# Patient Record
Sex: FEMALE | Race: WHITE
[De-identification: names, ages, dates, MRNs, and addresses within clinical notes are randomized per-mention and may not be internally consistent; named-entity substitution may affect disease eponyms.]

---

## 2018-05-31 ENCOUNTER — HOSPITAL ENCOUNTER (EMERGENCY)
Dept: HOSPITAL 56 - MW.ED | Age: 53
Discharge: HOME | End: 2018-05-31
Payer: COMMERCIAL

## 2018-05-31 VITALS — SYSTOLIC BLOOD PRESSURE: 130 MMHG | DIASTOLIC BLOOD PRESSURE: 73 MMHG

## 2018-05-31 DIAGNOSIS — S40.012A: ICD-10-CM

## 2018-05-31 DIAGNOSIS — Z88.2: ICD-10-CM
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
## 2018-05-31 DIAGNOSIS — S93.402A: Primary | ICD-10-CM

## 2018-05-31 DIAGNOSIS — Z79.899: ICD-10-CM

## 2018-05-31 DIAGNOSIS — Z88.5: ICD-10-CM

## 2018-05-31 PROCEDURE — 73030 X-RAY EXAM OF SHOULDER: CPT

## 2018-05-31 PROCEDURE — 73610 X-RAY EXAM OF ANKLE: CPT

## 2018-05-31 PROCEDURE — 99283 EMERGENCY DEPT VISIT LOW MDM: CPT

## 2018-05-31 PROCEDURE — 96372 THER/PROPH/DIAG INJ SC/IM: CPT

## 2018-05-31 NOTE — CR
Left ankle

 

Medical history: Pain

 

Comparison: January 4, 2012

 

Findings: Again seen is diffuse soft tissue swelling of the lower leg. The mortise of the ankle is in
tact. The bones are mildly osteopenic. There is no acute bony finding.

 

Impression: Global soft tissue swelling without bony abnormality

## 2018-05-31 NOTE — CR
Left shoulder

 

Clinical history: Pain

 

Comparison: None.

 

Findings: The humeral head articular extremity with the glenoid without fracture or subluxation or si
gnificant para articular calcification. There is mild AC joint osteoarthropathy. No contiguous bony a
bnormalities of the ribs are otherwise are identified.

 

Impression: Mild osteoarthritic arthropathy of the AC joint without acute findings.

## 2018-05-31 NOTE — EDM.PDOC
ED HPI GENERAL MEDICAL PROBLEM





- General


Chief Complaint: Lower Extremity Injury/Pain


Stated Complaint: FALL


Time Seen by Provider: 05/31/18 10:32





- History of Present Illness


INITIAL COMMENTS - FREE TEXT/NARRATIVE: 





HISTORY AND PHYSICAL:





History of present illness:


The patient is a 52-year-old female who presents after she slipped on a rug at 

home and landed onto her left. She was having a normal morning prior to these 

events and this event occurred about 3-1/2 hours ago. She complains of pain at 

her left ankle and her left shoulder but no hip or knee pain she has no head 

neck or back pain nor did she pass out. Patient has no chest pain or shortness 

of breath no rib pain and no abdominal complaints. Patient says she has had ice 

on her ankle but is concerned because it is still very swollen.





Review of systems: 


As per history of present illness and below otherwise all systems reviewed and 

negative.





Past medical history: 


As per history of present illness and as reviewed below otherwise 

noncontributory.





Surgical history: 


As per history of present illness and as reviewed below otherwise 

noncontributory.





Social history: 


No reported history of drug or alcohol abuse.





Family history: 


As per history of present illness and as reviewed below otherwise 

noncontributory.





Physical exam:


General: Well-developed well-nourished female who is nontoxic and vital signs 

are reviewed by me


HEENT: Atraumatic, normocephalic,  negative for conjunctival pallor or scleral 

icterus, mucous membranes moist, throat clear, neck supple, nontender, trachea 

midline. There are no midline step-offs tenderness defects of the cervical 

spine and no scalp tenderness or swelling is appreciated on palpation


Lungs: Clear to auscultation, breath sounds equal bilaterally, chest nontender.


Heart: S1S2, regular rate and rhythm no overt murmurs


Abdomen: Soft, nondistended, nontender. NABS


Pelvis: Stable nontender. No lateral hip tenderness


Genitourinary: Deferred.


Rectal: Deferred.


Extremities: Atraumatic with full range of motion of all extremities with the 

exception of the left shoulder and left ankle. At the left shoulder there is no 

soft tissue swelling ecchymosis or palpable bony deformities but there is 

tenderness with palpation at the lateral aspect of the humerus and distal 

clavicle area neurovascular is intact distally and there is no distal bony 

deformities or tenderness. At the left ankle there is soft tissue swelling at 

the lateral malleolus with tenderness but no malalignment is appreciated of the 

ankle. Distal metatarsals and toes are intact without tenderness or deformities 

and proximal tib-fib knee thigh and hip are nontender and there are no palpable 

bony deformities. Pulses are intact throughout. The legs are, negative for 

cords or calf pain. Neurovascular unremarkable.


Neuro: Awake, alert, oriented. Cranial nerves II through XII unremarkable. 

Cerebellum unremarkable. Motor and sensory unremarkable throughout. Exam 

nonfocal.


Back: There are no midline step-offs in his defects of the thoracic or lumbar 

spine no posterior rib or posterior pelvis tenderness and no soft tissue trauma 

is appreciated


Diagnostics:


X-ray left ankle and left shoulder





Therapeutics:


Toradol crutches cam boot





Impression: 


Fall with left shoulder contusion and left ankle sprain





Definitive disposition and diagnosis as appropriate pending reevaluation and 

review of above.


  ** Left Ankle


Pain Score (Numeric/FACES): 7





  ** Left Shoulder


Pain Score (Numeric/FACES): 3





- Related Data


 Allergies











Allergy/AdvReac Type Severity Reaction Status Date / Time


 


codeine Allergy  Hives Verified 05/31/18 10:41


 


pseudoephedrine Allergy  palpitation Verified 05/31/18 10:41





   s  


 


Sulfa (Sulfonamide Allergy  unknown Verified 05/31/18 10:41





Antibiotics)     











Home Meds: 


 Home Meds





Amitriptyline [Elavil] 1 tab PO DAILY 03/13/15 [History]


ClonazePAM [KlonoPIN] 2 mg PO TID 05/31/18 [History]











Review of Systems





- Review of Systems


Review Of Systems: ROS reveals no pertinent complaints other than HPI.





ED EXAM, GENERAL





- Physical Exam


Exam: See Below (see dictation)





Course





- Vital Signs


Last Recorded V/S: 


 Last Vital Signs











Temp  37.4 C   05/31/18 10:31


 


Pulse  89   05/31/18 10:31


 


Resp  18   05/31/18 10:31


 


BP  130/73   05/31/18 10:31


 


Pulse Ox  86 L  05/31/18 10:31














- Orders/Labs/Meds


Orders: 


 Active Orders 24 hr











 Category Date Time Status


 


 DME for Discharge [COMM] Stat Oth  05/31/18 12:09 Ordered











Meds: 


Medications














Discontinued Medications














Generic Name Dose Route Start Last Admin





  Trade Name Freq  PRN Reason Stop Dose Admin


 


Ketorolac Tromethamine  60 mg  05/31/18 10:37  05/31/18 10:58





  Toradol  IM  05/31/18 10:38  60 mg





  ONETIME ONE   Administration





     





     





     





     














Departure





- Departure


Time of Disposition: 12:10


Disposition: Home, Self-Care 01


Condition: Good


Clinical Impression: 


Ankle sprain


Qualifiers:


 Encounter type: initial encounter Involved ligament of ankle: unspecified 

ligament Laterality: left Qualified Code(s): S93.402A - Sprain of unspecified 

ligament of left ankle, initial encounter





Fall


Qualifiers:


 Encounter type: initial encounter Qualified Code(s): W19.XXXA - Unspecified 

fall, initial encounter





Contusion of left shoulder


Qualifiers:


 Encounter type: initial encounter Qualified Code(s): S40.012A - Contusion of 

left shoulder, initial encounter








- Discharge Information


Referrals: 


PCP,None [Primary Care Provider] - 


Forms:  ED Department Discharge


Additional Instructions: 


The following information is given to patients seen in the emergency department 

who are being discharged to home. This information is to outline your options 

for follow-up care. We provide all patients seen in our emergency department 

with a follow-up referral.





The need for follow-up, as well as the timing and circumstances, are variable 

depending upon the specifics of your emergency department visit.





If you don't have a primary care physician on staff, we will provide you with a 

referral. We always advise you to contact your personal physician following an 

emergency department visit to inform them of the circumstance of the visit and 

for follow-up with them and/or the need for any referrals to a consulting 

specialist.





The emergency department will also refer you to a specialist when appropriate. 

This referral assures that you have the opportunity for followup care with a 

specialist. All of these measure are taken in an effort to provide you with 

optimal care, which includes your followup.





Under all circumstances we always encourage you to contact your private 

physician who remains a resource for coordinating  your care. When calling for 

followup care, please make the office aware that this follow-up is from your 

recent emergency room visit. If for any reason you are refused follow-up, 

please contact the [facility] emergency 

department at [phone] and ask to speak to the emergency department 

charge nurse.





[facility] 


Specialty Care--Orthopedic clinic


[address] [address]


[phone]








Ice to all areas of discomfort and you will be sore and have aches and pains 

over the next few days to one week. Elevate the ankle as much as possible. 

Please call and schedule a follow-up appointment in orthopedics clinic using 

resources given to above and return to ER as needed and as discussed. Use over-

the-counter ibuprofen/Motrin for pain area you may use the tramadol as 

prescribed for breakthrough pain and only take this while you're at home. Use 

crutches at all times and wear cam boot until you're seen in the orthopedics 

clinic. Please loosen or move the boot at sleep times.





- My Orders


Last 24 Hours: 


My Active Orders





05/31/18 12:09


DME for Discharge [COMM] Stat 














- Assessment/Plan


Last 24 Hours: 


My Active Orders





05/31/18 12:09


DME for Discharge [COMM] Stat

## 2019-04-19 ENCOUNTER — HOSPITAL ENCOUNTER (EMERGENCY)
Dept: HOSPITAL 56 - MW.ED | Age: 54
Discharge: HOME | End: 2019-04-19
Payer: COMMERCIAL

## 2019-04-19 VITALS — SYSTOLIC BLOOD PRESSURE: 112 MMHG | DIASTOLIC BLOOD PRESSURE: 73 MMHG

## 2019-04-19 DIAGNOSIS — Z88.2: ICD-10-CM

## 2019-04-19 DIAGNOSIS — Z79.899: ICD-10-CM

## 2019-04-19 DIAGNOSIS — Z88.5: ICD-10-CM

## 2019-04-19 DIAGNOSIS — K62.5: Primary | ICD-10-CM

## 2019-04-19 DIAGNOSIS — Z88.8: ICD-10-CM

## 2019-04-19 DIAGNOSIS — F17.210: ICD-10-CM

## 2019-04-19 DIAGNOSIS — R09.02: ICD-10-CM

## 2019-04-19 DIAGNOSIS — N10: ICD-10-CM

## 2019-04-19 DIAGNOSIS — N39.0: ICD-10-CM

## 2019-04-19 LAB
CHLORIDE SERPL-SCNC: 100 MMOL/L (ref 98–107)
SODIUM SERPL-SCNC: 141 MMOL/L (ref 136–145)

## 2019-04-19 PROCEDURE — 85730 THROMBOPLASTIN TIME PARTIAL: CPT

## 2019-04-19 PROCEDURE — 71046 X-RAY EXAM CHEST 2 VIEWS: CPT

## 2019-04-19 PROCEDURE — 85025 COMPLETE CBC W/AUTO DIFF WBC: CPT

## 2019-04-19 PROCEDURE — 87086 URINE CULTURE/COLONY COUNT: CPT

## 2019-04-19 PROCEDURE — 96375 TX/PRO/DX INJ NEW DRUG ADDON: CPT

## 2019-04-19 PROCEDURE — 80053 COMPREHEN METABOLIC PANEL: CPT

## 2019-04-19 PROCEDURE — 83690 ASSAY OF LIPASE: CPT

## 2019-04-19 PROCEDURE — 96361 HYDRATE IV INFUSION ADD-ON: CPT

## 2019-04-19 PROCEDURE — 93005 ELECTROCARDIOGRAM TRACING: CPT

## 2019-04-19 PROCEDURE — 74177 CT ABD & PELVIS W/CONTRAST: CPT

## 2019-04-19 PROCEDURE — 99284 EMERGENCY DEPT VISIT MOD MDM: CPT

## 2019-04-19 PROCEDURE — 81001 URINALYSIS AUTO W/SCOPE: CPT

## 2019-04-19 PROCEDURE — 96374 THER/PROPH/DIAG INJ IV PUSH: CPT

## 2019-04-19 PROCEDURE — 87040 BLOOD CULTURE FOR BACTERIA: CPT

## 2019-04-19 PROCEDURE — 87186 SC STD MICRODIL/AGAR DIL: CPT

## 2019-04-19 PROCEDURE — 87088 URINE BACTERIA CULTURE: CPT

## 2019-04-19 PROCEDURE — 85610 PROTHROMBIN TIME: CPT

## 2019-04-19 PROCEDURE — 83605 ASSAY OF LACTIC ACID: CPT

## 2019-04-19 PROCEDURE — 84484 ASSAY OF TROPONIN QUANT: CPT

## 2019-04-19 NOTE — EDM.PDOC
<Juma Mcconnell - Last Filed: 04/19/19 23:31>





ED HPI GENERAL MEDICAL PROBLEM





- General


Chief Complaint: Gastrointestinal Problem


Stated Complaint: DIARRHEA


Time Seen by Provider: 04/19/19 20:32





- History of Present Illness


INITIAL COMMENTS - FREE TEXT/NARRATIVE: 





Patient presents as above of seeming and examined the patient she is hypoxic 

down to 80% on room air long smoking history


Did discuss CT imaging in detail she has some intermittent bright red blood per 

rectum with stools no abdominal pain


Patient also has a UTI





I strongly urged the patient to be admitted, he refuses admission and elects to 

sign out AGAINST MEDICAL ADVICE excepting death as a consequence she has voiced 

understanding, she states she'll return if symptoms persist or worsen however 

family is here for Lenora and she is refusing to stay in the hospital





HEENT grossly within normal limits


Chest clear


CV regular


Abdomen benign


 extremities full range of motion strength 5 out of 5 no edema


CNS alert nonfocal


Rectal exam patient deferred 





Therapeutics





Cipro


Medrol Dosepak


Patient has an albuterol HFA





Impression


Hypoxia


Red blood per rectum with stools


Colitis versus ischemic bowel


UTI





Patient signed out AGAINST MEDICAL ADVICE





Definitive disposition and diagnosis as appropriate pending reevaluation and 

review of above








Impression





- Related Data


 Allergies











Allergy/AdvReac Type Severity Reaction Status Date / Time


 


codeine Allergy  Hives Verified 11/05/18 18:09


 


pseudoephedrine Allergy  palpitation Verified 11/05/18 18:09





   s  


 


Sulfa (Sulfonamide Allergy  unknown Verified 11/05/18 18:09





Antibiotics)     











Home Meds: 


 Home Meds





Amitriptyline [Elavil] 1 tab PO DAILY 03/13/15 [History]


ClonazePAM [KlonoPIN] 2 mg PO ASDIRECTED PRN 05/31/18 [History]











ED ROS GENERAL





- Review of Systems


Review Of Systems: See Below





ED EXAM, GI/ABD





- Physical Exam


Exam: See Below





Course





- Vital Signs


Last Recorded V/S: 


 Last Vital Signs











Temp  36.8 C   04/19/19 23:49


 


Pulse  95   04/19/19 23:49


 


Resp  22 H  04/19/19 23:49


 


BP  112/73   04/19/19 23:49


 


Pulse Ox  71 L  04/19/19 23:49














- Orders/Labs/Meds


Labs: 


 Laboratory Tests











  04/19/19 04/19/19 04/19/19 Range/Units





  20:05 21:09 21:09 


 


WBC  15.95 H    (4.0-11.0)  K/uL


 


RBC  5.59    (4.30-5.90)  M/uL


 


Hgb  17.8 H    (12.0-16.0)  g/dL


 


Hct  53.8 H    (36.0-46.0)  %


 


MCV  96.2    (80.0-98.0)  fL


 


MCH  31.8    (27.0-32.0)  pg


 


MCHC  33.1    (31.0-37.0)  g/dL


 


RDW Std Deviation  55.1    (28.0-62.0)  fl


 


RDW Coeff of Jason  16 H    (11.0-15.0)  %


 


Plt Count  130 L    (150-400)  K/uL


 


MPV  11.00    (7.40-12.00)  fL


 


Neut % (Auto)  80.8 H    (48.0-80.0)  %


 


Lymph % (Auto)  12.0 L    (16.0-40.0)  %


 


Mono % (Auto)  6.5    (0.0-15.0)  %


 


Eos % (Auto)  0.6    (0.0-7.0)  %


 


Baso % (Auto)  0.1    (0.0-1.5)  %


 


Neut # (Auto)  12.9 H    (1.4-5.7)  K/uL


 


Lymph # (Auto)  1.9    (0.6-2.4)  K/uL


 


Mono # (Auto)  1.0 H    (0.0-0.8)  K/uL


 


Eos # (Auto)  0.1    (0.0-0.7)  K/uL


 


Baso # (Auto)  0.0    (0.0-0.1)  K/uL


 


Nucleated RBC %  0.0    /100WBC


 


Nucleated RBCs #  0    K/uL


 


INR     


 


APTT     (18.6-31.3)  SEC


 


Lactate     (0.20-2.00)  mmol/L


 


Sodium   141   (136-145)  mmol/L


 


Potassium   3.4 L   (3.5-5.1)  mmol/L


 


Chloride   100   ()  mmol/L


 


Carbon Dioxide   30.4   (21.0-32.0)  mmol/L


 


BUN   8   (7.0-18.0)  mg/dL


 


Creatinine   0.9   (0.6-1.0)  mg/dL


 


Est Cr Clr Drug Dosing   68.68   mL/min


 


Estimated GFR (MDRD)   > 60.0   ml/min


 


Glucose   109 H   ()  mg/dL


 


Calcium   8.3 L   (8.5-10.1)  mg/dL


 


Total Bilirubin   0.9   (0.2-1.0)  mg/dL


 


AST   13 L   (15-37)  IU/L


 


ALT   13 L   (14-63)  IU/L


 


Alkaline Phosphatase   116   ()  U/L


 


Troponin I     (0.000-0.056)  ng/mL


 


Total Protein   6.9   (6.4-8.2)  g/dL


 


Albumin   3.0 L   (3.4-5.0)  g/dL


 


Globulin   3.9   (2.6-4.0)  g/dL


 


Albumin/Globulin Ratio   0.8 L   (0.9-1.6)  


 


Lipase    83  ()  U/L


 


Urine Color     


 


Urine Appearance     


 


Urine pH     (5.0-8.0)  


 


Ur Specific Gravity     (1.001-1.035)  


 


Urine Protein     (NEGATIVE)  mg/dL


 


Urine Glucose (UA)     (NEGATIVE)  mg/dL


 


Urine Ketones     (NEGATIVE)  mg/dL


 


Urine Occult Blood     (NEGATIVE)  


 


Urine Nitrite     (NEGATIVE)  


 


Urine Bilirubin     (NEGATIVE)  


 


Urine Ictotest     


 


Urine Urobilinogen     (<2.0)  EU/dL


 


Ur Leukocyte Esterase     (NEGATIVE)  


 


Urine RBC     (0-2/HPF)  


 


Urine WBC     (0-5/HPF)  


 


Ur Epithelial Cells     (NONE-FEW)  


 


Urine Bacteria     (NEGATIVE)  


 


Urine Mucus     (NONE-MOD)  














  04/19/19 04/19/19 04/19/19 Range/Units





  21:09 21:15 21:47 


 


WBC     (4.0-11.0)  K/uL


 


RBC     (4.30-5.90)  M/uL


 


Hgb     (12.0-16.0)  g/dL


 


Hct     (36.0-46.0)  %


 


MCV     (80.0-98.0)  fL


 


MCH     (27.0-32.0)  pg


 


MCHC     (31.0-37.0)  g/dL


 


RDW Std Deviation     (28.0-62.0)  fl


 


RDW Coeff of Jason     (11.0-15.0)  %


 


Plt Count     (150-400)  K/uL


 


MPV     (7.40-12.00)  fL


 


Neut % (Auto)     (48.0-80.0)  %


 


Lymph % (Auto)     (16.0-40.0)  %


 


Mono % (Auto)     (0.0-15.0)  %


 


Eos % (Auto)     (0.0-7.0)  %


 


Baso % (Auto)     (0.0-1.5)  %


 


Neut # (Auto)     (1.4-5.7)  K/uL


 


Lymph # (Auto)     (0.6-2.4)  K/uL


 


Mono # (Auto)     (0.0-0.8)  K/uL


 


Eos # (Auto)     (0.0-0.7)  K/uL


 


Baso # (Auto)     (0.0-0.1)  K/uL


 


Nucleated RBC %     /100WBC


 


Nucleated RBCs #     K/uL


 


INR  1.06    


 


APTT  23.8    (18.6-31.3)  SEC


 


Lactate    1.0  (0.20-2.00)  mmol/L


 


Sodium     (136-145)  mmol/L


 


Potassium     (3.5-5.1)  mmol/L


 


Chloride     ()  mmol/L


 


Carbon Dioxide     (21.0-32.0)  mmol/L


 


BUN     (7.0-18.0)  mg/dL


 


Creatinine     (0.6-1.0)  mg/dL


 


Est Cr Clr Drug Dosing     mL/min


 


Estimated GFR (MDRD)     ml/min


 


Glucose     ()  mg/dL


 


Calcium     (8.5-10.1)  mg/dL


 


Total Bilirubin     (0.2-1.0)  mg/dL


 


AST     (15-37)  IU/L


 


ALT     (14-63)  IU/L


 


Alkaline Phosphatase     ()  U/L


 


Troponin I     (0.000-0.056)  ng/mL


 


Total Protein     (6.4-8.2)  g/dL


 


Albumin     (3.4-5.0)  g/dL


 


Globulin     (2.6-4.0)  g/dL


 


Albumin/Globulin Ratio     (0.9-1.6)  


 


Lipase     ()  U/L


 


Urine Color   DARK YELLOW   


 


Urine Appearance   HAZY   


 


Urine pH   6.0   (5.0-8.0)  


 


Ur Specific Gravity   1.020   (1.001-1.035)  


 


Urine Protein   30 H   (NEGATIVE)  mg/dL


 


Urine Glucose (UA)   NEGATIVE   (NEGATIVE)  mg/dL


 


Urine Ketones   TRACE H   (NEGATIVE)  mg/dL


 


Urine Occult Blood   NEGATIVE   (NEGATIVE)  


 


Urine Nitrite   NEGATIVE   (NEGATIVE)  


 


Urine Bilirubin   MODERATE H   (NEGATIVE)  


 


Urine Ictotest   NEGATIVE   


 


Urine Urobilinogen   1.0   (<2.0)  EU/dL


 


Ur Leukocyte Esterase   SMALL H   (NEGATIVE)  


 


Urine RBC   0-4   (0-2/HPF)  


 


Urine WBC   50-60   (0-5/HPF)  


 


Ur Epithelial Cells   MODERATE   (NONE-FEW)  


 


Urine Bacteria   1+ H   (NEGATIVE)  


 


Urine Mucus   LIGHT   (NONE-MOD)  














  04/19/19 Range/Units





  21:47 


 


WBC   (4.0-11.0)  K/uL


 


RBC   (4.30-5.90)  M/uL


 


Hgb   (12.0-16.0)  g/dL


 


Hct   (36.0-46.0)  %


 


MCV   (80.0-98.0)  fL


 


MCH   (27.0-32.0)  pg


 


MCHC   (31.0-37.0)  g/dL


 


RDW Std Deviation   (28.0-62.0)  fl


 


RDW Coeff of Jason   (11.0-15.0)  %


 


Plt Count   (150-400)  K/uL


 


MPV   (7.40-12.00)  fL


 


Neut % (Auto)   (48.0-80.0)  %


 


Lymph % (Auto)   (16.0-40.0)  %


 


Mono % (Auto)   (0.0-15.0)  %


 


Eos % (Auto)   (0.0-7.0)  %


 


Baso % (Auto)   (0.0-1.5)  %


 


Neut # (Auto)   (1.4-5.7)  K/uL


 


Lymph # (Auto)   (0.6-2.4)  K/uL


 


Mono # (Auto)   (0.0-0.8)  K/uL


 


Eos # (Auto)   (0.0-0.7)  K/uL


 


Baso # (Auto)   (0.0-0.1)  K/uL


 


Nucleated RBC %   /100WBC


 


Nucleated RBCs #   K/uL


 


INR   


 


APTT   (18.6-31.3)  SEC


 


Lactate   (0.20-2.00)  mmol/L


 


Sodium   (136-145)  mmol/L


 


Potassium   (3.5-5.1)  mmol/L


 


Chloride   ()  mmol/L


 


Carbon Dioxide   (21.0-32.0)  mmol/L


 


BUN   (7.0-18.0)  mg/dL


 


Creatinine   (0.6-1.0)  mg/dL


 


Est Cr Clr Drug Dosing   mL/min


 


Estimated GFR (MDRD)   ml/min


 


Glucose   ()  mg/dL


 


Calcium   (8.5-10.1)  mg/dL


 


Total Bilirubin   (0.2-1.0)  mg/dL


 


AST   (15-37)  IU/L


 


ALT   (14-63)  IU/L


 


Alkaline Phosphatase   ()  U/L


 


Troponin I  < 0.050  (0.000-0.056)  ng/mL


 


Total Protein   (6.4-8.2)  g/dL


 


Albumin   (3.4-5.0)  g/dL


 


Globulin   (2.6-4.0)  g/dL


 


Albumin/Globulin Ratio   (0.9-1.6)  


 


Lipase   ()  U/L


 


Urine Color   


 


Urine Appearance   


 


Urine pH   (5.0-8.0)  


 


Ur Specific Gravity   (1.001-1.035)  


 


Urine Protein   (NEGATIVE)  mg/dL


 


Urine Glucose (UA)   (NEGATIVE)  mg/dL


 


Urine Ketones   (NEGATIVE)  mg/dL


 


Urine Occult Blood   (NEGATIVE)  


 


Urine Nitrite   (NEGATIVE)  


 


Urine Bilirubin   (NEGATIVE)  


 


Urine Ictotest   


 


Urine Urobilinogen   (<2.0)  EU/dL


 


Ur Leukocyte Esterase   (NEGATIVE)  


 


Urine RBC   (0-2/HPF)  


 


Urine WBC   (0-5/HPF)  


 


Ur Epithelial Cells   (NONE-FEW)  


 


Urine Bacteria   (NEGATIVE)  


 


Urine Mucus   (NONE-MOD)  











Meds: 


Medications














Discontinued Medications














Generic Name Dose Route Start Last Admin





  Trade Name Freq  PRN Reason Stop Dose Admin


 


Albuterol/Ipratropium  3 ml  04/19/19 23:09  04/19/19 23:16





  Duoneb 3.0-0.5 Mg/3 Ml  NEB  04/19/19 23:10  3 ml





  ONETIME ONE   Administration





     





     





     





     


 


Sodium Chloride  1,000 mls @ 999 mls/hr  04/19/19 21:02  04/19/19 21:26





  Normal Saline  IV  04/19/19 22:02  999 mls/hr





  BOLUS ONE   Administration





     





     





     





     


 


Iopamidol  100 ml  04/19/19 21:41  04/19/19 22:02





  Isovue-370 (76%)  IVPUSH  04/19/19 21:42  100 ml





  ONETIME ONE   Administration





     





     





     





     


 


Methylprednisolone Sodium Succinate  125 mg  04/19/19 23:09  04/19/19 23:19





  Solu-Medrol  IVPUSH  04/19/19 23:10  125 mg





  ONETIME ONE   Administration





     





     





     





     


 


Morphine Sulfate  2 mg  04/19/19 21:48  04/19/19 22:08





  Morphine  IVPUSH  04/19/19 21:49  2 mg





  ONETIME ONE   Administration





     





     





     





     














Departure





- Departure


Time of Disposition: 23:34


Disposition: Home, Self-Care 01


Condition: Poor


Clinical Impression: 


 Hypoxia, UTI, Urinary tract infectious disease, Bright red blood per rectum








- Discharge Information


Instructions:  Hypoxia, Urinary Tract Infection, Adult, Easy-to-Read, Rectal 

Bleeding, Easy-to-Read


Referrals: 


PCP,Unknown [Primary Care Provider] - 


Forms:  ED Department Discharge


Additional Instructions: 


Medications as prescribed


Strongly urged return if symptoms persist or worsen or if new concerning 

symptoms develop


Follow-up primary care on Monday for marie 





[facility]


[address] [place] [id number]


Phone: [phone]


Fax: [phone]








The following information is given to patients seen in the emergency department 

who are being discharged to home. This information is to outline your options 

for follow-up care. We provide all patients seen in our emergency department 

with a follow-up referral.





The need for follow-up, as well as the timing and circumstances, are variable 

depending upon the specifics of your emergency department visit.





If you don't have a primary care physician on staff, we will provide you with a 

referral. We always advise you to contact your personal physician following an 

emergency department visit to inform them of the circumstance of the visit and 

for follow-up with them and/or the need for any referrals to a consulting 

specialist.





The emergency department will also refer you to a specialist when appropriate. 

This referral assures that you have the opportunity for follow-up care with a 

specialist. All of these measure are taken in an effort to provide you with 

optimal care, which includes your follow-up.





Under all circumstances we always encourage you to contact your private 

physician who remains a resource for coordinating your care. When calling for 

follow-up care, please make the office aware that this follow-up is from your 

recent emergency room visit. If for any reason you are refused follow-up, 

please contact the [facility] emergency department at [phone] 

and asked to speak to the emergency department charge nurse.











<Yamileth Jeffrey - Last Filed: 04/21/19 15:45>





ED HPI GENERAL MEDICAL PROBLEM





- General


Source of Information: Reports: Patient


History Limitations: Reports: No Limitations





- History of Present Illness


INITIAL COMMENTS - FREE TEXT/NARRATIVE: 


HISTORY AND PHYSICAL:





History of present illness:


Patient is a 53-year-old female who presents to the ED today with concern of 

rectal bleeding and passing clots of blood per rectum 2 days. Patient states 

she had a week where she was not able to have a bowel movement. Patient states 

she then took 2 Dulcolax tabs and started to have diarrhea over the past 3 

days. Since then, patient states over the past 24 hours she has had a small 

amount of blood continuously, out of her rectum. She states on several separate 

occasions they have been blood clots that have come out. Patient states she has 

not had diarrhea today and her last bowel movement was yesterday. Patient 

states she has a history of colon perforation that required ileostomy and 

several surgeries. Patient states she's also has left lower and right lower 

abdominal pain that she rates a 6 out of 10. She states this pain comes and 

goes and she describes it as "crampy." Patient denies any pain with the bowel 

movement and has not noticed any notable hemorrhoids. Patient denies ever 

having these symptoms prior. Patient also complains of low back pain that she 

rates a 7/10.





Patient denies fever, chills, chest pain, shortness of breath, or cough. Denies 

headache, neck stiff ness, change in vision, syncope, or near syncope. Denies 

nausea, vomiting or dysuria. Has not noted any blood in urine. Patient has 

noticed a decrease in appetite but has been drinking fluids today.





Review of systems: 


As per history of present illness and below otherwise all systems reviewed and 

negative.





Past medical history: 


As per history of present illness and as reviewed below otherwise 

noncontributory.





Surgical history: 


As per history of present illness and as reviewed below otherwise 

noncontributory.





Social history: 


See social history for further information





Family history: 


As per history of present illness and as reviewed below otherwise 

noncontributory.





Physical exam:


General: Patient is alert, oriented, and in no acute distress. Patient sitting 

comfortably on exam table.


HEENT: Atraumatic, normocephalic, pupils equal and reactive bilaterally, 

negative for conjunctival pallor or scleral icterus, mucous membranes moist, 

TMs normal bilaterally, throat clear, neck supple, nontender, trachea midline. 

No drooling or trismus noted. No meningeal signs. No hot potato voice noted. 


Lungs: Clear to auscultation, breath sounds equal bilaterally, chest nontender.


Heart: S1S2, regular rate and rhythm without overt murmur


Abdomen: Moderate pain to palpation of the right and left lower quadrants 

without guarding.  Obese, soft, nondistended. Positive bowel sounds throughout 

all quadrants and slightly hypoactive. Negative for masses or 

hepatosplenomegaly. Positive for costovertebral tenderness of the left.


Pelvis: Stable nontender.


Genitourinary: Deferred.


Rectal: Hemoccult positive with bright red blood per rectum. No obvious 

external or internal hemorrhoids on exam. No rectal masses noted.


Skin: Intact, warm, dry. No lesions or rashes noted.


Extremities: Atraumatic, negative for cords or calf pain. Neurovascular 

unremarkable.


Neuro: Awake, alert, oriented. Cranial nerves II through XII unremarkable. 

Cerebellum unremarkable. Motor and sensory unremarkable throughout. Exam 

nonfocal.





Notes:


Dr. Mcconnell has assumed care of patient and will follow all remaining 

diagnostics and disposition.





Diagnostics:


CBC, CMP, PT/INR, PTT, UA, abdominal pelvic CT, lipase, stool studies, cdiff, 

lactate, blood cultures 2, EKG, troponin, CXR





Therapeutics:


Saline, Morphine





Impression: 


Acute pyelonephritis


Hypoxia





Plan:








Definitive disposition and diagnosis as appropriate pending reevaluation and 

review of above.





  ** back


Pain Score (Numeric/FACES): 9





Past Medical History


OB/GYN History: Reports: Pregnancy


Psychiatric History: Reports: Anxiety, Depression, PTSD





- Infectious Disease History


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

, Measles, Mumps





- Past Surgical History


HEENT Surgical History: Reports: Adenoidectomy, Tonsillectomy


GI Surgical History: Reports: Appendectomy, Cholecystectomy, Colostomy, Hernia, 

Abdominal, Other (See Below)


Other GI Surgeries/Procedures: prior colostomy and ileostomy


Female  Surgical History: Reports: Hysterectomy, Salpingo-Oophorectomy


Musculoskeletal Surgical History: Reports: Other (See Below)


Other Musculoskeletal Surgeries/Procedures:: right thumb surgery





Social & Family History





- Family History


Family Medical History: Noncontributory





- Tobacco Use


Smoking Status *Q: Current Every Day Smoker


Years of Tobacco use: 40


Packs/Tins Daily: 0.2





- Caffeine Use


Caffeine Use: Reports: Coffee





- Recreational Drug Use


Recreational Drug Use: No





ED ROS GENERAL





- Review of Systems


Review Of Systems: ROS reveals no pertinent complaints other than HPI.





ED EXAM, GI/ABD





- Physical Exam


Exam: See Below (see dictation)

## 2019-04-19 NOTE — CR
INDICATION:



Pain and shortness of breath



TECHNIQUE:



Chest 2 views



COMPARISON:



Chest x-ray 11/05/2018



FINDINGS:



Cardiovascular and mediastinum:  Heart size and vasculature are normal in 

caliber and appearance.  



Lungs and pleural spaces:  Lungs are clear.  No sign of infiltrate or mass. 

 No sign of pleural effusion.  No pneumothorax.  



Bones and soft tissues:  No significant findings.



IMPRESSION:



No acute findings and no significant changes from the prior exam.



Dictated by Demetri Villa MD @ Apr 19 2019 11:27PM



Signed by Dr. Demetri Villa @ Apr 19 2019 11:28PM

## 2019-04-19 NOTE — CT
INDICATION:



Abdominal pain



TECHNIQUE:



CT abdomen and pelvis acquired with 100 cc Isovue 370 intravenous contrast.



COMPARISON:



None. 



FINDINGS:



Lower chest: Unremarkable. 



Liver: Normal in contour with focal fat adjacent to the falciform ligament. 

Gallbladder and bile ducts: Status post cholecystectomy. 



Pancreas: Minimal calcification of the pancreatic head. 



Spleen: Unremarkable. Normal in size. No masses. 



Adrenal glands: Unremarkable. No nodules. 



Kidneys: Unremarkable. No masses, stones, or hydronephrosis. 



GI tract: The stomach is unremarkable. Small bowel is decompressed. Long 

segment wall thickening of the colon beginning at the distal transverse 

colon extending to the junction with the sigmoid. Small likely 

calcifications or clips noted near the distal descending colon. Slight 

adjacent fat stranding without evidence of abscess.



Multiple surgical clips in the mesentery.



Vasculature: Atherosclerosis without abdominal aortic aneurysm. 



Lymph nodes: Increased number of retroperitoneal lymph nodes measuring up 

to 9 millimeters in short axis. Subcentimeter lymph nodes noted within the 

central mesentery. 



Pelvis: Bladder is unremarkable. Status posthysterectomy. 



Bones: Unremarkable for age. 



IMPRESSION:



1. Long segment colonic wall thickening with adjacent inflammation 

extending from the distal transverse colon to the descending colon/sigmoid 

junction. Appearance is consistent with long segment colitis. Differential 

diagnosis would include ischemic colitis considering this distribution 

versus infectious colitis. No pneumoperitoneum or abscess. 



2. Mildly increased number of retroperitoneal lymph nodes, nonspecific. 

Differential diagnosis includes infectious, inflammatory and neoplastic 

etiologies. 



3. Other incidental findings as noted above.



Please note that all CT scans at this facility use dose modulation, 

iterative reconstruction, and/or weight-based dosing when appropriate to 

reduce radiation dose to as low as reasonably achievable.



Dictated by Demetri Villa MD @ Apr 19 2019 10:46PM



Signed by Dr. Demetri Villa @ Apr 19 2019 10:56PM

## 2019-10-29 ENCOUNTER — HOSPITAL ENCOUNTER (INPATIENT)
Dept: HOSPITAL 56 - MW.ED | Age: 54
LOS: 1 days | Discharge: NURSING, REHAB OR LONG-TERM CARE | DRG: 133 | End: 2019-10-30
Attending: INTERNAL MEDICINE | Admitting: INTERNAL MEDICINE
Payer: COMMERCIAL

## 2019-10-29 VITALS — HEART RATE: 104 BPM

## 2019-10-29 DIAGNOSIS — Z90.89: ICD-10-CM

## 2019-10-29 DIAGNOSIS — N17.9: ICD-10-CM

## 2019-10-29 DIAGNOSIS — Z88.5: ICD-10-CM

## 2019-10-29 DIAGNOSIS — Z79.899: ICD-10-CM

## 2019-10-29 DIAGNOSIS — R04.2: ICD-10-CM

## 2019-10-29 DIAGNOSIS — Z88.8: ICD-10-CM

## 2019-10-29 DIAGNOSIS — J96.92: Primary | ICD-10-CM

## 2019-10-29 DIAGNOSIS — J96.91: ICD-10-CM

## 2019-10-29 DIAGNOSIS — D75.1: ICD-10-CM

## 2019-10-29 DIAGNOSIS — Z90.710: ICD-10-CM

## 2019-10-29 DIAGNOSIS — F41.9: ICD-10-CM

## 2019-10-29 DIAGNOSIS — J18.9: ICD-10-CM

## 2019-10-29 DIAGNOSIS — F17.200: ICD-10-CM

## 2019-10-29 DIAGNOSIS — Z88.2: ICD-10-CM

## 2019-10-29 DIAGNOSIS — Z79.82: ICD-10-CM

## 2019-10-29 DIAGNOSIS — F32.9: ICD-10-CM

## 2019-10-29 DIAGNOSIS — Z90.49: ICD-10-CM

## 2019-10-29 DIAGNOSIS — I26.99: ICD-10-CM

## 2019-10-29 LAB
BUN SERPL-MCNC: 15 MG/DL (ref 7–18)
CHLORIDE SERPL-SCNC: 96 MMOL/L (ref 98–107)
CO2 SERPL-SCNC: 26.6 MMOL/L (ref 21–32)
GLUCOSE SERPL-MCNC: 132 MG/DL (ref 74–106)
POTASSIUM SERPL-SCNC: 3.9 MMOL/L (ref 3.5–5.1)
SODIUM SERPL-SCNC: 133 MMOL/L (ref 136–145)

## 2019-10-29 NOTE — PCM.HP.2
H&P History of Present Illness





- General


Date of Service: 10/29/19


Admit Problem/Dx: 


 Admission Diagnosis/Problem





Admission Diagnosis/Problem      Pulmonary embolism











- History of Present Illness


Initial Comments - Free Text/Narative: 





54 y/o female who presented to the ER complaining of hemoptysis, dyspnea. 

Patient states she had recently driven back from [place], [place]. Started noticing 

light blood tinge sputum yesterday. Today, she was having more dark red 

hemoptysis. Feeling short of breath. Denies any chest pain, nausea, vomiting. 

No abdominal pain, dysuria, diarrhea. No lower extremity edema or pain.  States 

she smokes daily. Trying to cut down. Denies alcohol intake. No illicit drug 

use. Not on any hormone therapy. No family history of coagulopathy. 





In the ER, she was found to be hypoxic with O2 sats in north 70's. Started on 4 

L NC and O2 sats above 90%. CT chest showed a right pulmonary artery embolism 

with extension to the right lower lobe.  She was given Lovenox 90 mg SQ Once.  

Currently, denying any chest pain, dyspnea. Still having some hemoptysis.  

Chest xray showed a right lower lung infiltrates. She has leukocytosis of 16. 


  ** Middle Back


Pain Score (Numeric/FACES): 8





- Related Data


Allergies/Adverse Reactions: 


 Allergies











Allergy/AdvReac Type Severity Reaction Status Date / Time


 


codeine Allergy  Hives Verified 10/29/19 15:34


 


pseudoephedrine Allergy  palpitation Verified 10/29/19 15:34





   s  


 


Sulfa (Sulfonamide Allergy  unknown Verified 10/29/19 15:34





Antibiotics)     











Home Medications: 


 Home Meds





Amitriptyline [Elavil] 1 tab PO DAILY 03/13/15 [History]


ClonazePAM [KlonoPIN] 2 mg PO ASDIRECTED PRN 05/31/18 [History]


Aspirin [Ecotrin EC] 4 tab PO DAILY 10/29/19 [History]











Past Medical History


Respiratory History: Reports: SOB


OB/GYN History: Reports: Pregnancy


Psychiatric History: Reports: Anxiety, Depression, PTSD





- Infectious Disease History


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

, Measles, Mumps





- Past Surgical History


HEENT Surgical History: Reports: Adenoidectomy, Tonsillectomy


GI Surgical History: Reports: Appendectomy, Cholecystectomy, Colostomy, Hernia, 

Abdominal, Other (See Below)


Other GI Surgeries/Procedures: prior colostomy and ileostomy


Female  Surgical History: Reports: Hysterectomy, Salpingo-Oophorectomy


Musculoskeletal Surgical History: Reports: Other (See Below)


Other Musculoskeletal Surgeries/Procedures:: right thumb surgery





Social & Family History





- Family History


Family Medical History: Noncontributory





- Tobacco Use


Smoking Status *Q: Current Every Day Smoker


Years of Tobacco use: 35


Packs/Tins Daily: 0.5





- Caffeine Use


Caffeine Use: Reports: Coffee





- Recreational Drug Use


Recreational Drug Use: No





H&P Review of Systems





- Review of Systems:


Review Of Systems: ROS reveals no pertinent complaints other than HPI.





Exam





- Exam


Exam: See Below





- Vital Signs


Vital Signs: 


 Last Vital Signs











Temp  36.6 C   10/29/19 15:30


 


Pulse  100   10/29/19 17:05


 


Resp  20   10/29/19 17:05


 


BP  128/77   10/29/19 17:05


 


Pulse Ox  95   10/29/19 17:05











Weight: 93.44 kg





- Exam


Quality Assessment: Supplemental Oxygen


General: Alert, Oriented, Cooperative


HEENT: Other (dry blood tinged oral mucosa)


Lungs: Other (Decreased lung sounds bilaterally with some right basilar 

crackles. No wheezing.)


Cardiovascular: Regular Rhythm, Tachycardia


GI/Abdominal Exam: Normal Bowel Sounds, Soft, Non-Tender, No Distention


Back Exam: Normal Inspection


Extremities: Normal Inspection, No Pedal Edema


Skin: Warm, Dry


Neuro Extensive - Mental Status: Alert, Oriented x3





- Patient Data


Lab Results Last 24 hrs: 


 Laboratory Results - last 24 hr











  10/29/19 10/29/19 10/29/19 Range/Units





  15:40 15:40 15:40 


 


WBC  16.04 H    (4.0-11.0)  K/uL


 


RBC  6.07 H    (4.30-5.90)  M/uL


 


Hgb  18.5 H    (12.0-16.0)  g/dL


 


Hct  57.4 H    (36.0-46.0)  %


 


MCV  94.6    (80.0-98.0)  fL


 


MCH  30.5    (27.0-32.0)  pg


 


MCHC  32.2    (31.0-37.0)  g/dL


 


RDW Std Deviation  60.1    (28.0-62.0)  fl


 


RDW Coeff of Jason  18 H    (11.0-15.0)  %


 


Plt Count  118 L    (150-400)  K/uL


 


MPV  10.50    (7.40-12.00)  fL


 


Neut % (Auto)  84.0 H    (48.0-80.0)  %


 


Lymph % (Auto)  8.0 L    (16.0-40.0)  %


 


Mono % (Auto)  7.8    (0.0-15.0)  %


 


Eos % (Auto)  0.1    (0.0-7.0)  %


 


Baso % (Auto)  0.1    (0.0-1.5)  %


 


Neut # (Auto)  13.5 H    (1.4-5.7)  K/uL


 


Lymph # (Auto)  1.3    (0.6-2.4)  K/uL


 


Mono # (Auto)  1.3 H    (0.0-0.8)  K/uL


 


Eos # (Auto)  0.0    (0.0-0.7)  K/uL


 


Baso # (Auto)  0.0    (0.0-0.1)  K/uL


 


Nucleated RBC %  0.0    /100WBC


 


Nucleated RBCs #  0    K/uL


 


INR   1.28   


 


D-Dimer, Quantitative   1.81 H   (0.0-0.50)  mg/L FEU


 


Lactate     (0.20-2.00)  mmol/L


 


Sodium    133 L  (136-145)  mmol/L


 


Potassium    3.9  (3.5-5.1)  mmol/L


 


Chloride    96 L  ()  mmol/L


 


Carbon Dioxide    26.6  (21.0-32.0)  mmol/L


 


BUN    15  (7.0-18.0)  mg/dL


 


Creatinine    1.2 H  (0.6-1.0)  mg/dL


 


Est Cr Clr Drug Dosing    51.51  mL/min


 


Estimated GFR (MDRD)    46.6  ml/min


 


Glucose    132 H  ()  mg/dL


 


Calcium    8.6  (8.5-10.1)  mg/dL


 


Total Bilirubin    1.2 H  (0.2-1.0)  mg/dL


 


AST    16  (15-37)  IU/L


 


ALT    11 L  (14-63)  IU/L


 


Alkaline Phosphatase    135 H  ()  U/L


 


Troponin I    < 0.050  (0.000-0.056)  ng/mL


 


Total Protein    7.4  (6.4-8.2)  g/dL


 


Albumin    3.1 L  (3.4-5.0)  g/dL


 


Globulin    4.3 H  (2.6-4.0)  g/dL


 


Albumin/Globulin Ratio    0.7 L  (0.9-1.6)  














  10/29/19 Range/Units





  15:40 


 


WBC   (4.0-11.0)  K/uL


 


RBC   (4.30-5.90)  M/uL


 


Hgb   (12.0-16.0)  g/dL


 


Hct   (36.0-46.0)  %


 


MCV   (80.0-98.0)  fL


 


MCH   (27.0-32.0)  pg


 


MCHC   (31.0-37.0)  g/dL


 


RDW Std Deviation   (28.0-62.0)  fl


 


RDW Coeff of Jason   (11.0-15.0)  %


 


Plt Count   (150-400)  K/uL


 


MPV   (7.40-12.00)  fL


 


Neut % (Auto)   (48.0-80.0)  %


 


Lymph % (Auto)   (16.0-40.0)  %


 


Mono % (Auto)   (0.0-15.0)  %


 


Eos % (Auto)   (0.0-7.0)  %


 


Baso % (Auto)   (0.0-1.5)  %


 


Neut # (Auto)   (1.4-5.7)  K/uL


 


Lymph # (Auto)   (0.6-2.4)  K/uL


 


Mono # (Auto)   (0.0-0.8)  K/uL


 


Eos # (Auto)   (0.0-0.7)  K/uL


 


Baso # (Auto)   (0.0-0.1)  K/uL


 


Nucleated RBC %   /100WBC


 


Nucleated RBCs #   K/uL


 


INR   


 


D-Dimer, Quantitative   (0.0-0.50)  mg/L FEU


 


Lactate  3.8 H  (0.20-2.00)  mmol/L


 


Sodium   (136-145)  mmol/L


 


Potassium   (3.5-5.1)  mmol/L


 


Chloride   ()  mmol/L


 


Carbon Dioxide   (21.0-32.0)  mmol/L


 


BUN   (7.0-18.0)  mg/dL


 


Creatinine   (0.6-1.0)  mg/dL


 


Est Cr Clr Drug Dosing   mL/min


 


Estimated GFR (MDRD)   ml/min


 


Glucose   ()  mg/dL


 


Calcium   (8.5-10.1)  mg/dL


 


Total Bilirubin   (0.2-1.0)  mg/dL


 


AST   (15-37)  IU/L


 


ALT   (14-63)  IU/L


 


Alkaline Phosphatase   ()  U/L


 


Troponin I   (0.000-0.056)  ng/mL


 


Total Protein   (6.4-8.2)  g/dL


 


Albumin   (3.4-5.0)  g/dL


 


Globulin   (2.6-4.0)  g/dL


 


Albumin/Globulin Ratio   (0.9-1.6)  











Result Diagrams: 


 10/29/19 15:40





 10/29/19 15:40


[facility] Results Last 24 hrs: 


 Microbiology











 10/29/19 15:50 Influenza Type A Antigen Screen - Final





 Nasopharyngeal Swab    NEGATIVE INFLUENZA A VIRUS AG





    REFERENCE RANGE: NEGATIVE





 Influenza Type B Antigen Screen - Final





    NEGATIVE INFLUENZA B VIRUS AG





    REFERENCE RANGE: NEGATIVE











Problem List Initiated/Reviewed/Updated: Yes


Orders Last 24hrs: 


 Active Orders 24 hr











 Category Date Time Status


 


 Admission Status [Patient Status] [ADT] Stat ADT  10/29/19 17:34 Active


 


 EKG Documentation Completion [RC] STAT Care  10/29/19 15:33 Active


 


 Intake and Output [RC] QSHIFT Care  10/29/19 18:04 Ordered


 


 Oxygen Therapy [RC] PRN Care  10/29/19 18:03 Ordered


 


 Oxygen Therapy, ED [RC] ASDIRECTED Care  10/29/19 15:33 Active


 


 RT Aerosol Therapy [RC] ASDIRECTED Care  10/29/19 15:34 Active


 


 RT Aerosol Therapy [RC] ASDIRECTED Care  10/29/19 18:05 Ordered


 


 Up ad [place] [RC] ASDIRECTED Care  10/29/19 18:03 Ordered


 


 VTE/DVT Education [RC] PER UNIT ROUTINE Care  10/29/19 18:03 Ordered


 


 Vital Signs [RC] Q4H Care  10/29/19 18:03 Ordered


 


 Regular Diet [DIET] Diet  10/29/19 Dinner Ordered


 


 Echo Comp wo Cont [US] Routine Exams  10/30/19 08:00 Ordered


 


 CULTURE BLOOD [BC] Stat Lab  10/29/19 15:40 Received


 


 CULTURE BLOOD [BC] Stat Lab  10/29/19 16:05 Received


 


 Acetaminophen [Tylenol] Med  10/29/19 18:03 Active





 650 mg PO Q4H PRN   


 


 Acetaminophen/HYDROcodone [Norco 325-5 MG] Med  10/29/19 18:03 Active





 1 tab PO Q4H PRN   


 


 Albuterol/Ipratropium [DuoNeb 3.0-0.5 MG/3 ML] Med  10/29/19 18:03 Ordered





 3 ml NEB Q4HRRT PRN   


 


 Morphine Med  10/29/19 18:03 Ordered





 2 mg IVPUSH Q2H PRN   


 


 Nicotine [Habitrol] Med  10/29/19 18:15 Ordered





 14 mg TRDERM DAILY   


 


 Ondansetron [Zofran ODT] Med  10/29/19 18:03 Ordered





 4 mg PO Q4H PRN   


 


 Ondansetron [Zofran] Med  10/29/19 18:03 Ordered





 4 mg IVPUSH Q4H PRN   


 


 Sodium Chloride 0.9% [Normal Saline] 1,000 ml Med  10/29/19 17:40 Active





 IV .Bolus   


 


 Sodium Chloride 0.9% [Saline Flush] Med  10/29/19 15:33 Active





 10 ml FLUSH ASDIRECTED PRN   


 


 Sodium Chloride 0.9% [Saline Flush] Med  10/29/19 15:33 Active





 2.5 ml FLUSH ASDIRECTED PRN   


 


 Temazepam [Restoril] Med  10/29/19 18:03 Ordered





 15 mg PO BEDTIME PRN   


 


 Blood Culture x2 Reflex Set [OM.PC] Stat Oth  10/29/19 15:34 Ordered


 


 Saline Lock Insert [OM.PC] Stat Oth  10/29/19 15:33 Ordered


 


 Resuscitation Status Routine Resus Stat  10/29/19 18:03 Ordered








 Medication Orders





Acetaminophen (Tylenol)  650 mg PO Q4H PRN


   PRN Reason: Pain (Mild 1-3)/fever


Hydrocodone Bitart/Acetaminophen (Norco 325-5 Mg)  1 tab PO Q4H PRN


   PRN Reason: Pain (moderate 4-6)


Albuterol/Ipratropium (Duoneb 3.0-0.5 Mg/3 Ml)  3 ml NEB Q4HRRT PRN


   PRN Reason: Shortness Of Breath/wheezing


Sodium Chloride (Normal Saline)  1,000 mls @ 999 mls/hr IV .Bolus ONE


   Stop: 10/29/19 18:40


   Last Admin: 10/29/19 17:52  Dose: 999 mls/hr


Morphine Sulfate (Morphine)  2 mg IVPUSH Q2H PRN


   PRN Reason: Pain (severe 7-10)


   Stop: 10/30/19 18:05


Nicotine (Habitrol)  14 mg TRDERM DAILY [place]


Ondansetron HCl (Zofran Odt)  4 mg PO Q4H PRN


   PRN Reason: nausea, able to take PO


Ondansetron HCl (Zofran)  4 mg IVPUSH Q4H PRN


   PRN Reason: Nausea


Sodium Chloride (Saline Flush)  10 ml FLUSH ASDIRECTED PRN


   PRN Reason: Keep Vein Open


Sodium Chloride (Saline Flush)  2.5 ml FLUSH ASDIRECTED PRN


   PRN Reason: Keep Vein Open


Temazepam (Restoril)  15 mg PO BEDTIME PRN


   PRN Reason: Sleep








Assessment/Plan Comment:: 





A:


1. Right pulmonary artery embolism with extension to right lower lobe


2. Erythrocytosis likely secondary to dehydration on top of suspected sleep 

apnea


3. Acute kidney injury


4. Elevated lactic acid due to above








P:





1. Right pulmonary artery embolism- Full dose Lovenox SQ Q12H.  Will need to 

discuss with patient if either starting NOACs or warfarin. Will get Echo. Start 

High flow at 6 L and titrate as needed. Repeat lactic acid q6H until normal.





2. SAMANTHA- maintenance fluids NS  ml/hr. Monitor kidney function.





dispo: 2-3 days

## 2019-10-29 NOTE — CR
Indication:



Coughing up blood.



Technique:



A single AP portable view of the chest was obtained.



Comparison:



April 19, 2019.



Findings:



The heart is normal in size. The left lung is clear. Opacities are 

identified within the interstitium and the right perihilar region. No 

pleural effusion or pneumothorax is identified.



Impression:



Right perihilar increased interstitial opacities, most likely representing 

an infiltrative process.



Dictated by Wanda Horton MD @ Oct 29 2019  4:08PM



Signed by Dr. Wanda Horton @ Oct 29 2019  4:09PM

## 2019-10-29 NOTE — CT
Indication:



Hemoptysis. Elevated D-dimer.



Technique:



Multiple contiguous axial images were obtained from the thoracic inlet 

through the upper abdomen after the intravenous administration of 80 

milliliters Isovue 370.



Please note that all CT scans at this facility use dose modulation, 

iterative reconstruction, and/or weight-based dosing when appropriate to 

reduce radiation dose to as low as reasonably achievable. 



Comparison:



None



Findings:



Pulmonary emboli urine of the right main pulmonary artery and in the 

pulmonary artery to the right lower lobe. These do not extend into the 

right middle lobe or right upper lobe. A right hilar lymph node is 

identified measuring 1.6 x 2.0 cm in size. Fullness is identified in the 

subcarinal region, suggesting lymphadenopathy. No axillary lymphadenopathy 

is identified. A precarinal lymph node is identified measuring 1.3 x 1.4 cm 

in size. The heart is borderline in size. No pericardial effusion is 

identified. 



The visualized portions of the liver, spleen, adrenals, pancreas, and left 

kidney are grossly normal. Kyphosis of the thoracic spine is identified. No 

acute fracture is identified. 



A small to moderate right-sided pleural effusion is identified. Diffusely 

increased interstitial opacities are identified throughout the right lower 

lobe and extend into the inferior aspect of the right upper lobe. No 

pneumothorax is identified. The left lung is relatively clear. 



Impression:



Right main pulmonary artery embolism that extends into the right lower 

lobe.



Small to moderate-sized right pleural effusion.



Diffuse increased interstitial opacities in the right lower lobe.



The findings were discussed with Benedict López at the time of this 

dictation



Please note that all CT scans at this facility use dose modulation, 

iterative reconstruction, and/or weight-based dosing when appropriate to 

reduce radiation dose to as low as reasonably achievable.



Dictated by Wanda Horton MD @ Oct 29 2019  5:08PM



Signed by Dr. Wanda Horton @ Oct 29 2019  5:15PM

## 2019-10-29 NOTE — US
INDICATION:



PE identified on CT today. 



COMPARISON:



None available 



FINDINGS:



Ultrasound of the venous drainage of both lower extremities shows no 

evidence of deep venous thrombosis. There is normal antegrade flow from the 

posterior tibial and peroneal veins superiorly through the common femoral 

vein in both legs. There is normal augmentation and compressibility of 

these veins.



IMPRESSION:



No evidence of deep venous thrombosis on ultrasound examination of both 

lower extremities.



Dictated by Tien Guerra MD @ Oct 29 2019 11:03PM



Signed by Dr. Tien Guerra @ Oct 29 2019 11:05PM

## 2019-10-29 NOTE — PN
TH[facility] Physician - Brief Progress [id number]/29/2019 19:36[facility] Christopher Egan, [place] - [facility] ([facility]portMACY BEARDENDate of Service 10/29/2019 19:36HPI/Event
s of Note EICU admission note:53 year old female presenting with hypoxemia and coughing clots Found t
o have a PE.She also has a moderate right sided pleural effusion and some lymphadenoopathy which is s
uspicious with her smoking history.Patient has an elevated BNP we are attempting to get an echo this 
evening to see if the patient requires TPA for right heart strain.The patient also likely has COPD/OS
A.She is likely hypoxemic at baseline based on elevated HB/HCT.  So this further complicates the case
 in evaluating submassive versus massive PE.I have ordered dopplers bilateral lower extremities to ev
aluate clot burden.Lovenox BID was started by the excellent PCP in the meanwhile.Due to elevated lact
ate I have started Zosyn, there is some infiltrative process on the CT scan of the chest, whether or 
not this is from hemorrhage or not, can not be bronched with acute PE safely.She also has Hilar and S
ubcarinal Lymphadenopathy.She needs serial lactates, and very very close follow up with pulmonary.In 
fact, she may require follow up short term CT chest with pulmonary ( 3- 6 weeks ) also with PFT in 6 
weeks or so She should stop smoking immediately.PSG should be done after all the rest of the work up 
is completed.Supplemental O2 with ambulation, sleep, and during rest should be evaluated.   Maintain 
sats > 90% with sleep and ambulation.  Overnight pulse ox 24 hours before d/c home should be complete
d.  Also and ambulatory pulse ox.Patient likely will need O2 upon d/c.EICU assessment:Acute PE, R ambrosio
n pulmonary artery, and RLLModerate Right Pleural EffusionsInfiltrates, diffuse in the RLL with under
lying fibrotic appearance is present on CT of chest.Subcarinal and hilar lymphadenopathy in the setti
ng of severe tobacco abuse.Leukocytosis with Elevated lactate, with infiltrate can not rule out sepsi
sTobacco abuseEICU recs:Start zosynStat EchoDoppler bilateral lower extremitiesFor now, Lovenox BID, 
if any further indication of RIght heart strain would have to consider ECKOS vs. TPANeeds maligancy w
ork up.  Needs Pulmonary follow up Needs short term ct follow upCautious usage of central depressing 
agents such as ativan and morphine as they may suppress her respiratory driveDecreased dosage of Ativ
an secondary to MAJO risk factorsPatient has full dose Elavil ordered, as well as Restoril.Maintain Sa
ts > 92%Sputum and blood cultures.Will sign out to pm eicu coverage to follow closely.Please call [facility]
h any changes/ recsInterventions Major-Respiratory failure - evaluation and managementMinor-Communica
tion with other healthcare providers and/or family, Routine modifications to care plan (e.g. PRN medi
cations for pain, fever)Electronically Signed by: JOSÉ MIGUEL MARIEE () on 10/29/2019 19:51

## 2019-10-29 NOTE — EDM.PDOC
ED HPI GENERAL MEDICAL PROBLEM





- General


Chief Complaint: Respiratory Problem


Stated Complaint: BLOOD CLOTS


Time Seen by Provider: 10/29/19 15:35


Source of Information: Reports: Patient


History Limitations: Reports: No Limitations





- History of Present Illness


INITIAL COMMENTS - FREE TEXT/NARRATIVE: 





HISTORY AND PHYSICAL:





History of present illness:


Patient is a 53-year-old female presents to the ED with complaint of cough x 5 

days. She states today she has been coughing up blood clots. She recently 

travelled to and from [place], [place]. She reports shortness of breath and pain in 

right mid back but denies chest pain. She states she's felt chills and 

subjective fevers. Denies nausea, vomiting, abdominal pain. Smokes 1/2 ppd x 35 

years. 





O2 in 70s on arrival, improved to 90s with 4L nasal cannula. 





Review of systems: 


As per history of present illness and below otherwise all systems reviewed and 

negative.





Past medical history: 


As per history of present illness and as reviewed below otherwise 

noncontributory.





Surgical history: 


As per history of present illness and as reviewed below otherwise 

noncontributory.





Social history: 


No reported history of drug or alcohol abuse.





Family history: 


As per history of present illness and as reviewed below otherwise 

noncontributory.





Physical exam:


General: Patient sitting comfortably in no acute distress and nontoxic appearing


HEENT: Atraumatic, normocephalic, pupils reactive, negative for conjunctival 

pallor or scleral icterus, mucous membranes moist, throat clear, neck supple, 

nontender, trachea midline. No meningeal signs. 


Lungs: Breath sounds are diminished throughout with crackles to the bases, 

chest nontender.


Heart: S1S2, regular, negative for clicks, rubs, or overt murmur.


Abdomen: Soft, nondistended, nontender. Negative for masses or 

hepatosplenomegaly. Negative for costovertebral tenderness. No rigidity, rebound

, guarding.


Pelvis: Stable nontender.


Genitourinary: Deferred.


Rectal: Deferred.


Extremities: Atraumatic, negative for cords or calf pain. Neurovascular 

unremarkable.


Neuro: Awake, alert, oriented. Cranial nerves II through XII unremarkable. 

Cerebellum unremarkable. Motor and sensory unremarkable throughout. Exam 

nonfocal.





Notes: 





Diagnostics:


CBC, CMP, troponin, d-dimer, PT/INR, EKG, CXR, CTA, influenza 





Therapeutics:


DuoNeb


Solumedrol 125mg IV


1L NS IV 


2mg morphine IV 


1mg/kg Lovenox Subq 





Prescriptions:








Impression: 


Hypoxia, Pulmonary embolism 





Plan:


Discussed with Dr. Daugherty, patient will be admitted to ICU . 





Definitive disposition and diagnosis as appropriate pending reevaluation and 

review of above.





  ** Middle Back


Pain Score (Numeric/FACES): 8





- Related Data


 Allergies











Allergy/AdvReac Type Severity Reaction Status Date / Time


 


codeine Allergy  Hives Verified 10/29/19 15:34


 


pseudoephedrine Allergy  palpitation Verified 10/29/19 15:34





   s  


 


Sulfa (Sulfonamide Allergy  unknown Verified 10/29/19 15:34





Antibiotics)     











Home Meds: 


 Home Meds





Amitriptyline [Elavil] 1 tab PO DAILY 03/13/15 [History]


ClonazePAM [KlonoPIN] 2 mg PO ASDIRECTED PRN 05/31/18 [History]


Aspirin [Ecotrin EC] 4 tab PO DAILY 10/29/19 [History]











Past Medical History


OB/GYN History: Reports: Pregnancy


Psychiatric History: Reports: Anxiety, Depression, PTSD





- Infectious Disease History


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

, Measles, Mumps





- Past Surgical History


HEENT Surgical History: Reports: Adenoidectomy, Tonsillectomy


GI Surgical History: Reports: Appendectomy, Cholecystectomy, Colostomy, Hernia, 

Abdominal, Other (See Below)


Other GI Surgeries/Procedures: prior colostomy and ileostomy


Female  Surgical History: Reports: Hysterectomy, Salpingo-Oophorectomy


Musculoskeletal Surgical History: Reports: Other (See Below)


Other Musculoskeletal Surgeries/Procedures:: right thumb surgery





Social & Family History





- Family History


Family Medical History: Noncontributory





- Caffeine Use


Caffeine Use: Reports: Coffee





ED ROS GENERAL





- Review of Systems


Review Of Systems: ROS reveals no pertinent complaints other than HPI.





ED EXAM, GENERAL





- Physical Exam


Exam: See Below (see dictation)





Course





- Vital Signs


Last Recorded V/S: 


 Last Vital Signs











Temp  97.8 F   10/29/19 15:30


 


Pulse  114 H  10/29/19 15:30


 


Resp  20   10/29/19 15:30


 


BP  131/77   10/29/19 15:30


 


Pulse Ox  96   10/29/19 15:39














- Orders/Labs/Meds


Orders: 


 Active Orders 24 hr











 Category Date Time Status


 


 EKG Documentation Completion [RC] STAT Care  10/29/19 15:33 Active


 


 Oxygen Therapy, ED [RC] ASDIRECTED Care  10/29/19 15:33 Active


 


 RT Aerosol Therapy [RC] ASDIRECTED Care  10/29/19 15:34 Active


 


 CULTURE BLOOD [BC] Stat Lab  10/29/19 15:40 Received


 


 CULTURE BLOOD [BC] Stat Lab  10/29/19 16:05 Received


 


 Sodium Chloride 0.9% [Saline Flush] Med  10/29/19 15:33 Active





 10 ml FLUSH ASDIRECTED PRN   


 


 Sodium Chloride 0.9% [Saline Flush] Med  10/29/19 15:33 Active





 2.5 ml FLUSH ASDIRECTED PRN   


 


 Blood Culture x2 Reflex Set [OM.PC] Stat Oth  10/29/19 15:34 Ordered


 


 Saline Lock Insert [OM.PC] Stat Oth  10/29/19 15:33 Ordered








 Medication Orders





Sodium Chloride (Saline Flush)  10 ml FLUSH ASDIRECTED PRN


   PRN Reason: Keep Vein Open


Sodium Chloride (Saline Flush)  2.5 ml FLUSH ASDIRECTED PRN


   PRN Reason: Keep Vein Open








Labs: 


 Laboratory Tests











  10/29/19 10/29/19 10/29/19 Range/Units





  15:40 15:40 15:40 


 


WBC  16.04 H    (4.0-11.0)  K/uL


 


RBC  6.07 H    (4.30-5.90)  M/uL


 


Hgb  18.5 H    (12.0-16.0)  g/dL


 


Hct  57.4 H    (36.0-46.0)  %


 


MCV  94.6    (80.0-98.0)  fL


 


MCH  30.5    (27.0-32.0)  pg


 


MCHC  32.2    (31.0-37.0)  g/dL


 


RDW Std Deviation  60.1    (28.0-62.0)  fl


 


RDW Coeff of Jason  18 H    (11.0-15.0)  %


 


Plt Count  118 L    (150-400)  K/uL


 


MPV  10.50    (7.40-12.00)  fL


 


Neut % (Auto)  84.0 H    (48.0-80.0)  %


 


Lymph % (Auto)  8.0 L    (16.0-40.0)  %


 


Mono % (Auto)  7.8    (0.0-15.0)  %


 


Eos % (Auto)  0.1    (0.0-7.0)  %


 


Baso % (Auto)  0.1    (0.0-1.5)  %


 


Neut # (Auto)  13.5 H    (1.4-5.7)  K/uL


 


Lymph # (Auto)  1.3    (0.6-2.4)  K/uL


 


Mono # (Auto)  1.3 H    (0.0-0.8)  K/uL


 


Eos # (Auto)  0.0    (0.0-0.7)  K/uL


 


Baso # (Auto)  0.0    (0.0-0.1)  K/uL


 


Nucleated RBC %  0.0    /100WBC


 


Nucleated RBCs #  0    K/uL


 


INR   1.28   


 


D-Dimer, Quantitative   1.81 H   (0.0-0.50)  mg/L FEU


 


Lactate     (0.20-2.00)  mmol/L


 


Sodium    133 L  (136-145)  mmol/L


 


Potassium    3.9  (3.5-5.1)  mmol/L


 


Chloride    96 L  ()  mmol/L


 


Carbon Dioxide    26.6  (21.0-32.0)  mmol/L


 


BUN    15  (7.0-18.0)  mg/dL


 


Creatinine    1.2 H  (0.6-1.0)  mg/dL


 


Est Cr Clr Drug Dosing    51.51  mL/min


 


Estimated GFR (MDRD)    46.6  ml/min


 


Glucose    132 H  ()  mg/dL


 


Calcium    8.6  (8.5-10.1)  mg/dL


 


Total Bilirubin    1.2 H  (0.2-1.0)  mg/dL


 


AST    16  (15-37)  IU/L


 


ALT    11 L  (14-63)  IU/L


 


Alkaline Phosphatase    135 H  ()  U/L


 


Troponin I    < 0.050  (0.000-0.056)  ng/mL


 


Total Protein    7.4  (6.4-8.2)  g/dL


 


Albumin    3.1 L  (3.4-5.0)  g/dL


 


Globulin    4.3 H  (2.6-4.0)  g/dL


 


Albumin/Globulin Ratio    0.7 L  (0.9-1.6)  














  10/29/19 Range/Units





  15:40 


 


WBC   (4.0-11.0)  K/uL


 


RBC   (4.30-5.90)  M/uL


 


Hgb   (12.0-16.0)  g/dL


 


Hct   (36.0-46.0)  %


 


MCV   (80.0-98.0)  fL


 


MCH   (27.0-32.0)  pg


 


MCHC   (31.0-37.0)  g/dL


 


RDW Std Deviation   (28.0-62.0)  fl


 


RDW Coeff of Jason   (11.0-15.0)  %


 


Plt Count   (150-400)  K/uL


 


MPV   (7.40-12.00)  fL


 


Neut % (Auto)   (48.0-80.0)  %


 


Lymph % (Auto)   (16.0-40.0)  %


 


Mono % (Auto)   (0.0-15.0)  %


 


Eos % (Auto)   (0.0-7.0)  %


 


Baso % (Auto)   (0.0-1.5)  %


 


Neut # (Auto)   (1.4-5.7)  K/uL


 


Lymph # (Auto)   (0.6-2.4)  K/uL


 


Mono # (Auto)   (0.0-0.8)  K/uL


 


Eos # (Auto)   (0.0-0.7)  K/uL


 


Baso # (Auto)   (0.0-0.1)  K/uL


 


Nucleated RBC %   /100WBC


 


Nucleated RBCs #   K/uL


 


INR   


 


D-Dimer, Quantitative   (0.0-0.50)  mg/L FEU


 


Lactate  3.8 H  (0.20-2.00)  mmol/L


 


Sodium   (136-145)  mmol/L


 


Potassium   (3.5-5.1)  mmol/L


 


Chloride   ()  mmol/L


 


Carbon Dioxide   (21.0-32.0)  mmol/L


 


BUN   (7.0-18.0)  mg/dL


 


Creatinine   (0.6-1.0)  mg/dL


 


Est Cr Clr Drug Dosing   mL/min


 


Estimated GFR (MDRD)   ml/min


 


Glucose   ()  mg/dL


 


Calcium   (8.5-10.1)  mg/dL


 


Total Bilirubin   (0.2-1.0)  mg/dL


 


AST   (15-37)  IU/L


 


ALT   (14-63)  IU/L


 


Alkaline Phosphatase   ()  U/L


 


Troponin I   (0.000-0.056)  ng/mL


 


Total Protein   (6.4-8.2)  g/dL


 


Albumin   (3.4-5.0)  g/dL


 


Globulin   (2.6-4.0)  g/dL


 


Albumin/Globulin Ratio   (0.9-1.6)  











Meds: 


Medications











Generic Name Dose Route Start Last Admin





  Trade Name Freq  PRN Reason Stop Dose Admin


 


Sodium Chloride  10 ml  10/29/19 15:33  





  Saline Flush  FLUSH   





  ASDIRECTED PRN   





  Keep Vein Open   





     





     





     


 


Sodium Chloride  2.5 ml  10/29/19 15:33  





  Saline Flush  FLUSH   





  ASDIRECTED PRN   





  Keep Vein Open   





     





     





     














Discontinued Medications














Generic Name Dose Route Start Last Admin





  Trade Name Freq  PRN Reason Stop Dose Admin


 


Albuterol/Ipratropium  3 ml  10/29/19 15:34  10/29/19 15:50





  Duoneb 3.0-0.5 Mg/3 Ml  NEB  10/29/19 15:35  3 ml





  ONETIME ONE   Administration





     





     





     





     


 


Enoxaparin Sodium  900 mg  10/29/19 17:14  





  Lovenox  SUBCUT  10/29/19 17:15  





  ONETIME ONE   





     





     





     





     


 


Enoxaparin Sodium  90 mg  10/29/19 17:25  10/29/19 17:31





  Lovenox  SUBCUT  10/29/19 17:26  90 mg





  ONETIME ONE   Administration





     





     





     





     


 


Sodium Chloride  1,000 mls @ 999 mls/hr  10/29/19 15:47  10/29/19 16:07





  Normal Saline  IV  10/29/19 16:47  999 mls/hr





  STAT ONE   Administration





     





     





     





     


 


Iopamidol  80 ml  10/29/19 16:56  10/29/19 16:57





  Isovue Multipack-370 (76%)  IVPUSH  10/29/19 16:57  80 ml





  ONETIME STA   Administration





     





     





     





     


 


Methylprednisolone Sodium Succinate  125 mg  10/29/19 15:34  10/29/19 16:04





  Solu-Medrol  IVPUSH  10/29/19 15:35  125 mg





  ONETIME ONE   Administration





     





     





     





     


 


Morphine Sulfate  2 mg  10/29/19 17:22  10/29/19 17:31





  Morphine  IVPUSH  10/29/19 17:23  2 mg





  ONETIME ONE   Administration





     





     





     





     














Departure





- Departure


Time of Disposition: 17:34


Disposition: Refer to Observation


Condition: Good


Clinical Impression: 


 Pulmonary embolism, Hypoxia








- Discharge Information


Referrals: 


Quinton Lorenzo MD [Primary Care Provider] - 


Forms:  ED Department Discharge





- My Orders


Last 24 Hours: 


My Active Orders





10/29/19 15:33


EKG Documentation Completion [RC] STAT 


Oxygen Therapy, ED [RC] ASDIRECTED 


Sodium Chloride 0.9% [Saline Flush]   10 ml FLUSH ASDIRECTED PRN 


Sodium Chloride 0.9% [Saline Flush]   2.5 ml FLUSH ASDIRECTED PRN 


Saline Lock Insert [OM.PC] Stat 





10/29/19 15:34


RT Aerosol Therapy [RC] ASDIRECTED 


Blood Culture x2 Reflex Set [OM.PC] Stat 





10/29/19 15:40


CULTURE BLOOD [BC] Stat 





10/29/19 16:05


CULTURE BLOOD [BC] Stat 














- Assessment/Plan


Last 24 Hours: 


My Active Orders





10/29/19 15:33


EKG Documentation Completion [RC] STAT 


Oxygen Therapy, ED [RC] ASDIRECTED 


Sodium Chloride 0.9% [Saline Flush]   10 ml FLUSH ASDIRECTED PRN 


Sodium Chloride 0.9% [Saline Flush]   2.5 ml FLUSH ASDIRECTED PRN 


Saline Lock Insert [OM.PC] Stat 





10/29/19 15:34


RT Aerosol Therapy [RC] ASDIRECTED 


Blood Culture x2 Reflex Set [OM.PC] Stat 





10/29/19 15:40


CULTURE BLOOD [BC] Stat 





10/29/19 16:05


CULTURE BLOOD [BC] Stat

## 2019-10-30 VITALS — SYSTOLIC BLOOD PRESSURE: 101 MMHG | DIASTOLIC BLOOD PRESSURE: 63 MMHG

## 2019-10-30 LAB
BUN SERPL-MCNC: 12 MG/DL (ref 7–18)
CHLORIDE SERPL-SCNC: 107 MMOL/L (ref 98–107)
CO2 SERPL-SCNC: 27.2 MMOL/L (ref 21–32)
GLUCOSE SERPL-MCNC: 182 MG/DL (ref 74–106)
HBA1C BLD-MCNC: 6.3 % (ref 4.5–6.2)
POTASSIUM SERPL-SCNC: 4.3 MMOL/L (ref 3.5–5.1)
SODIUM SERPL-SCNC: 139 MMOL/L (ref 136–145)

## 2019-10-30 PROCEDURE — 03HB33Z INSERTION OF INFUSION DEVICE INTO RIGHT RADIAL ARTERY, PERCUTANEOUS APPROACH: ICD-10-PCS | Performed by: INTERNAL MEDICINE

## 2019-10-30 PROCEDURE — 3E033XZ INTRODUCTION OF VASOPRESSOR INTO PERIPHERAL VEIN, PERCUTANEOUS APPROACH: ICD-10-PCS | Performed by: INTERNAL MEDICINE

## 2019-10-30 PROCEDURE — 0BH17EZ INSERTION OF ENDOTRACHEAL AIRWAY INTO TRACHEA, VIA NATURAL OR ARTIFICIAL OPENING: ICD-10-PCS | Performed by: INTERNAL MEDICINE

## 2019-10-30 PROCEDURE — 5A1935Z RESPIRATORY VENTILATION, LESS THAN 24 CONSECUTIVE HOURS: ICD-10-PCS | Performed by: INTERNAL MEDICINE

## 2019-10-30 NOTE — PN
THC Physician - Brief Progress [id number]/30/2019 04:38[facility]
[place] - [facility] (D[place]) [facility] CenterMACY BEARDENMaryDate of Service 10/30/2019 04:38HPI/Event
s of Note Overnight events.Patient now with hypercapnic and hypoxemic respiratory failure (on ABG), d
ifficult to arouse. CxR shows worsening infiltrate and effusion right lung.Sats below 90% despite NRB
M.Plan:Hypercapnic and hypoxemic respiratory failure, due to worsening CAP (RLL) with hemoptysis, and
 RUL PE.Intubate and transfer to a tertiary facility for higher level of care.Vent and sedation order
s placed.MAP still 70s-80s but may drop with sedation for intubation and ventilation.Interventions Laurence anderson-Respiratory failure - evaluation and managementElectronically Signed by: EB HARRIS) on 10
/30/2019 04:41

## 2019-10-30 NOTE — PCM.SN
- Free Text/Narrative


Note: 


Called by nursing for intubation.  On arrival, pt is unresponsive SpO2 83% on 

15 LPM NRB, RR 20's, HR - 80 SR, /74.





RSI was performed with the following:





Etomidate 20mg IV


Succinylcholine 120mg IV





DL with Barker 2 yields grade I view.  Cuffed ETT placed at 22cm at the teeth.  

CXR shows good ETT position.  


Rocuronium 40mg IV for continued relaxation


Propofol drip started per protocol.





Post intubation VS


HR 76 SR


RR-22 controlled


SpO2 80% on FiO2 100%, ABG confirms these results.


BP 88/52


Dr Hernandez was consulted with and Levophed was chosen for vasopressor support.


Dr Daugherty is at the beside requesting arterial line placement as well.








Arterial Line Placement procedure Note


Arterial line placement is indicated due to frequent ABG's and for Levophed 

titration.  Unable to obtain consent due to the critical nature of this patient 

and no family is immediately available.  Rt radial pulse was palpated very 

weak.  Adequate collateral blood flow is noted.  Rt Wrist was prepped and 

draped in sterile fashion with betadine.  20g arrow catheter was then 

introduced into the Rt radial artery.  Guidewire advanced with ease, catheter 

was advanced without difficulty.  Pulsatile blood return was noted, good 

arterial waveform is noted when transduced.  Catheter was secured with armboard

, tegaderm, and tape.  No apparent complications are noted.

## 2019-10-30 NOTE — CR
INDICATION:



Post intubation.



TECHNIQUE:



Chest 1 view



COMPARISON:



Chest radiograph 10/30/2019.



FINDINGS:



Interval placement of endotracheal tube with tip 4.5 cm above the joie. 

Otherwise no significant change. Moderate right pleural effusion. Patchy 

right perihilar opacities and dense consolidation in the right lung base. 

These may represent atelectasis or infiltrate. Linear atelectasis left lung 

base. Cardiac silhouette is partially obscured but appears enlarged. No 

pneumothorax.



IMPRESSION:



1. Interval placement of ETT in appropriate position. 



2. Moderate right pleural effusion. 



3. Right mid and lower lung opacities and consolidation may represent 

atelectasis or infiltrate. 



4. Probable cardiomegaly.



Dictated by Akiko Hannah MD @ Oct 30 2019  5:59AM



Signed by Dr. Akiko Hannah @ Oct 30 2019  6:02AM

## 2019-10-30 NOTE — PN
THC Physician - Brief Progress [id number]/29/2019 23:24[facility] [place]
[place], [place] - [facility] (D[place]) [facility] BEARDENDate of Service 10/29/2019 23:24HPI/Event
s of Note Chart reviewed and case d/w bedside RN. On camera, the patient appears in NAD, RR 20, sat 9
3% on HFNC 12L (increased from previous), no accessory respiratory muscle use. Per bedside RN, the pa
tient is having about 1 teaspoon of hemoptysis (up to 10 times/hour). F/up LA normalized, Troponin st
ill negative.US lower extr - no DVT.A/P:1. PE, right main and RUL branch. No suggestion of RV strain 
on CT and Troponin negative. Given worsening hemoptysis, I would have a high threshold to administer 
tPA to this patient unless she is truly hemodynamically unstable. Also, if hemop[tysis continues to w
orsen, would change form Lovenox to Heparin drip (in case it needs to be stopped/reversed). I suspect
 the hemoptysis is more likiely related to her pneumonia, since there is no infiltrate in the RUL to 
suggest a pulm infarct.2. CAP - is on Cefepime. Added Azithromycin for atypical coverage.Intervention
s Major-Other: PE, hemoptysis, CAPElectronically Signed by: EB HARRIS) on 10/30/2019 01:17

## 2019-10-30 NOTE — [id number]
**Discharge Summary





- Discharge Data


Discharge Disposition: DC/[facility] to [facility]


Condition: Serious





- Referral to Home Health


Primary Care Physician: 


Quinton Lorenzo MD








- Discharge Plan


Home Medications: 


 Home Meds





Amitriptyline [Elavil] 100 mg PO BEDTIME 03/13/15 [History]


ClonazePAM [KlonoPIN] 2 mg PO BEDTIME PRN 05/31/18 [History]


Aspirin [Ecotrin EC] 4 tab PO DAILY 10/29/19 [History]








Forms:  ED Department Discharge


Referrals: 


Quinton Lorenzo MD [Primary Care Provider] - 





- Patient Data


Vitals - Most Recent: 


 Last Vital Signs











Temp  36.6 C   10/30/19 04:00


 


Pulse  104 H  10/29/19 18:51


 


Resp  35 H  10/30/19 04:00


 


BP  101/63   10/30/19 04:00


 


Pulse Ox  89 L  10/30/19 04:00











Weight - Most Recent: 98 kg


I&O - Last 24 hours: 


 Intake & Output











 10/29/19 10/30/19 10/30/19





 22:59 06:59 14:59


 


Intake Total 1099 250 


 


Output Total 500  


 


Balance 599 250 











Lab Results - Last 24 hrs: 


 Laboratory Results - last 24 hr











  10/29/19 10/29/19 10/29/19 Range/Units





  15:40 15:40 15:40 


 


WBC  16.04 H    (4.0-11.0)  K/uL


 


RBC  6.07 H    (4.30-5.90)  M/uL


 


Hgb  18.5 H    (12.0-16.0)  g/dL


 


Hct  57.4 H    (36.0-46.0)  %


 


MCV  94.6    (80.0-98.0)  fL


 


MCH  30.5    (27.0-32.0)  pg


 


MCHC  32.2    (31.0-37.0)  g/dL


 


RDW Std Deviation  60.1    (28.0-62.0)  fl


 


RDW Coeff of Jason  18 H    (11.0-15.0)  %


 


Plt Count  118 L    (150-400)  K/uL


 


MPV  10.50    (7.40-12.00)  fL


 


Neut % (Auto)  84.0 H    (48.0-80.0)  %


 


Lymph % (Auto)  8.0 L    (16.0-40.0)  %


 


Mono % (Auto)  7.8    (0.0-15.0)  %


 


Eos % (Auto)  0.1    (0.0-7.0)  %


 


Baso % (Auto)  0.1    (0.0-1.5)  %


 


Neut # (Auto)  13.5 H    (1.4-5.7)  K/uL


 


Lymph # (Auto)  1.3    (0.6-2.4)  K/uL


 


Mono # (Auto)  1.3 H    (0.0-0.8)  K/uL


 


Eos # (Auto)  0.0    (0.0-0.7)  K/uL


 


Baso # (Auto)  0.0    (0.0-0.1)  K/uL


 


Nucleated RBC %  0.0    /100WBC


 


Nucleated RBCs #  0    K/uL


 


INR   1.28   


 


D-Dimer, Quantitative   1.81 H   (0.0-0.50)  mg/L FEU


 


ABG pH     (7.35-7.45)  


 


ABG pCO2     (35-45)  mmHG


 


ABG pO2     ()  mmHG


 


ABG HCO3     (22-26)  mEq/L


 


ABG Total CO2     


 


ABG Base Excess     (-2.0-2.0)  


 


Lactate     (0.20-2.00)  mmol/L


 


Sodium    133 L  (136-145)  mmol/L


 


Potassium    3.9  (3.5-5.1)  mmol/L


 


Chloride    96 L  ()  mmol/L


 


Carbon Dioxide    26.6  (21.0-32.0)  mmol/L


 


BUN    15  (7.0-18.0)  mg/dL


 


Creatinine    1.2 H  (0.6-1.0)  mg/dL


 


Est Cr Clr Drug Dosing    51.51  mL/min


 


Estimated GFR (MDRD)    46.6  ml/min


 


Glucose    132 H  ()  mg/dL


 


Hemoglobin A1c     (4.5-6.2)  %


 


Calcium    8.6  (8.5-10.1)  mg/dL


 


Total Bilirubin    1.2 H  (0.2-1.0)  mg/dL


 


AST    16  (15-37)  IU/L


 


ALT    11 L  (14-63)  IU/L


 


Alkaline Phosphatase    135 H  ()  U/L


 


Troponin I    < 0.050  (0.000-0.056)  ng/mL


 


B-Natriuretic Peptide     (<100)  PG/ML


 


Total Protein    7.4  (6.4-8.2)  g/dL


 


Albumin    3.1 L  (3.4-5.0)  g/dL


 


Globulin    4.3 H  (2.6-4.0)  g/dL


 


Albumin/Globulin Ratio    0.7 L  (0.9-1.6)  














  10/29/19 10/29/19 10/29/19 Range/Units





  15:40 15:40 20:38 


 


WBC     (4.0-11.0)  K/uL


 


RBC     (4.30-5.90)  M/uL


 


Hgb     (12.0-16.0)  g/dL


 


Hct     (36.0-46.0)  %


 


MCV     (80.0-98.0)  fL


 


MCH     (27.0-32.0)  pg


 


MCHC     (31.0-37.0)  g/dL


 


RDW Std Deviation     (28.0-62.0)  fl


 


RDW Coeff of Jason     (11.0-15.0)  %


 


Plt Count     (150-400)  K/uL


 


MPV     (7.40-12.00)  fL


 


Neut % (Auto)     (48.0-80.0)  %


 


Lymph % (Auto)     (16.0-40.0)  %


 


Mono % (Auto)     (0.0-15.0)  %


 


Eos % (Auto)     (0.0-7.0)  %


 


Baso % (Auto)     (0.0-1.5)  %


 


Neut # (Auto)     (1.4-5.7)  K/uL


 


Lymph # (Auto)     (0.6-2.4)  K/uL


 


Mono # (Auto)     (0.0-0.8)  K/uL


 


Eos # (Auto)     (0.0-0.7)  K/uL


 


Baso # (Auto)     (0.0-0.1)  K/uL


 


Nucleated RBC %     /100WBC


 


Nucleated RBCs #     K/uL


 


INR     


 


D-Dimer, Quantitative     (0.0-0.50)  mg/L FEU


 


ABG pH     (7.35-7.45)  


 


ABG pCO2     (35-45)  mmHG


 


ABG pO2     ()  mmHG


 


ABG HCO3     (22-26)  mEq/L


 


ABG Total CO2     


 


ABG Base Excess     (-2.0-2.0)  


 


Lactate  3.8 H    (0.20-2.00)  mmol/L


 


Sodium     (136-145)  mmol/L


 


Potassium     (3.5-5.1)  mmol/L


 


Chloride     ()  mmol/L


 


Carbon Dioxide     (21.0-32.0)  mmol/L


 


BUN     (7.0-18.0)  mg/dL


 


Creatinine     (0.6-1.0)  mg/dL


 


Est Cr Clr Drug Dosing     mL/min


 


Estimated GFR (MDRD)     ml/min


 


Glucose     ()  mg/dL


 


Hemoglobin A1c     (4.5-6.2)  %


 


Calcium     (8.5-10.1)  mg/dL


 


Total Bilirubin     (0.2-1.0)  mg/dL


 


AST     (15-37)  IU/L


 


ALT     (14-63)  IU/L


 


Alkaline Phosphatase     ()  U/L


 


Troponin I    < 0.050  (0.000-0.056)  ng/mL


 


B-Natriuretic Peptide   650 H   (<100)  PG/ML


 


Total Protein     (6.4-8.2)  g/dL


 


Albumin     (3.4-5.0)  g/dL


 


Globulin     (2.6-4.0)  g/dL


 


Albumin/Globulin Ratio     (0.9-1.6)  














  10/29/19 10/30/19 10/30/19 Range/Units





  20:38 03:00 03:17 


 


WBC     (4.0-11.0)  K/uL


 


RBC     (4.30-5.90)  M/uL


 


Hgb     (12.0-16.0)  g/dL


 


Hct     (36.0-46.0)  %


 


MCV     (80.0-98.0)  fL


 


MCH     (27.0-32.0)  pg


 


MCHC     (31.0-37.0)  g/dL


 


RDW Std Deviation     (28.0-62.0)  fl


 


RDW Coeff of Jason     (11.0-15.0)  %


 


Plt Count     (150-400)  K/uL


 


MPV     (7.40-12.00)  fL


 


Neut % (Auto)     (48.0-80.0)  %


 


Lymph % (Auto)     (16.0-40.0)  %


 


Mono % (Auto)     (0.0-15.0)  %


 


Eos % (Auto)     (0.0-7.0)  %


 


Baso % (Auto)     (0.0-1.5)  %


 


Neut # (Auto)     (1.4-5.7)  K/uL


 


Lymph # (Auto)     (0.6-2.4)  K/uL


 


Mono # (Auto)     (0.0-0.8)  K/uL


 


Eos # (Auto)     (0.0-0.7)  K/uL


 


Baso # (Auto)     (0.0-0.1)  K/uL


 


Nucleated RBC %     /100WBC


 


Nucleated RBCs #     K/uL


 


INR     


 


D-Dimer, Quantitative     (0.0-0.50)  mg/L FEU


 


ABG pH    7.234 L  (7.35-7.45)  


 


ABG pCO2    61 H  (35-45)  mmHG


 


ABG pO2    66 L  ()  mmHG


 


ABG HCO3    26  (22-26)  mEq/L


 


ABG Total CO2    23.5  


 


ABG Base Excess    -3.1 L  (-2.0-2.0)  


 


Lactate  1.4    (0.20-2.00)  mmol/L


 


Sodium     (136-145)  mmol/L


 


Potassium     (3.5-5.1)  mmol/L


 


Chloride     ()  mmol/L


 


Carbon Dioxide     (21.0-32.0)  mmol/L


 


BUN     (7.0-18.0)  mg/dL


 


Creatinine     (0.6-1.0)  mg/dL


 


Est Cr Clr Drug Dosing     mL/min


 


Estimated GFR (MDRD)     ml/min


 


Glucose     ()  mg/dL


 


Hemoglobin A1c     (4.5-6.2)  %


 


Calcium     (8.5-10.1)  mg/dL


 


Total Bilirubin     (0.2-1.0)  mg/dL


 


AST     (15-37)  IU/L


 


ALT     (14-63)  IU/L


 


Alkaline Phosphatase     ()  U/L


 


Troponin I   < 0.050   (0.000-0.056)  ng/mL


 


B-Natriuretic Peptide     (<100)  PG/ML


 


Total Protein     (6.4-8.2)  g/dL


 


Albumin     (3.4-5.0)  g/dL


 


Globulin     (2.6-4.0)  g/dL


 


Albumin/Globulin Ratio     (0.9-1.6)  














  10/30/19 10/30/19 10/30/19 Range/Units





  04:15 05:25 06:03 


 


WBC     (4.0-11.0)  K/uL


 


RBC     (4.30-5.90)  M/uL


 


Hgb     (12.0-16.0)  g/dL


 


Hct     (36.0-46.0)  %


 


MCV     (80.0-98.0)  fL


 


MCH     (27.0-32.0)  pg


 


MCHC     (31.0-37.0)  g/dL


 


RDW Std Deviation     (28.0-62.0)  fl


 


RDW Coeff of Jason     (11.0-15.0)  %


 


Plt Count     (150-400)  K/uL


 


MPV     (7.40-12.00)  fL


 


Neut % (Auto)     (48.0-80.0)  %


 


Lymph % (Auto)     (16.0-40.0)  %


 


Mono % (Auto)     (0.0-15.0)  %


 


Eos % (Auto)     (0.0-7.0)  %


 


Baso % (Auto)     (0.0-1.5)  %


 


Neut # (Auto)     (1.4-5.7)  K/uL


 


Lymph # (Auto)     (0.6-2.4)  K/uL


 


Mono # (Auto)     (0.0-0.8)  K/uL


 


Eos # (Auto)     (0.0-0.7)  K/uL


 


Baso # (Auto)     (0.0-0.1)  K/uL


 


Nucleated RBC %     /100WBC


 


Nucleated RBCs #     K/uL


 


INR     


 


D-Dimer, Quantitative     (0.0-0.50)  mg/L FEU


 


ABG pH  7.242 L  7.320 L   (7.35-7.45)  


 


ABG pCO2  63 H  53 H   (35-45)  mmHG


 


ABG pO2  59 L  49 L   ()  mmHG


 


ABG HCO3  27 H  27 H   (22-26)  mEq/L


 


ABG Total CO2  24.5  24.7   


 


ABG Base Excess  -2.1 L  0.1   (-2.0-2.0)  


 


Lactate    0.8  (0.20-2.00)  mmol/L


 


Sodium     (136-145)  mmol/L


 


Potassium     (3.5-5.1)  mmol/L


 


Chloride     ()  mmol/L


 


Carbon Dioxide     (21.0-32.0)  mmol/L


 


BUN     (7.0-18.0)  mg/dL


 


Creatinine     (0.6-1.0)  mg/dL


 


Est Cr Clr Drug Dosing     mL/min


 


Estimated GFR (MDRD)     ml/min


 


Glucose     ()  mg/dL


 


Hemoglobin A1c     (4.5-6.2)  %


 


Calcium     (8.5-10.1)  mg/dL


 


Total Bilirubin     (0.2-1.0)  mg/dL


 


AST     (15-37)  IU/L


 


ALT     (14-63)  IU/L


 


Alkaline Phosphatase     ()  U/L


 


Troponin I     (0.000-0.056)  ng/mL


 


B-Natriuretic Peptide     (<100)  PG/ML


 


Total Protein     (6.4-8.2)  g/dL


 


Albumin     (3.4-5.0)  g/dL


 


Globulin     (2.6-4.0)  g/dL


 


Albumin/Globulin Ratio     (0.9-1.6)  














  10/30/19 10/30/19 10/30/19 Range/Units





  06:03 06:03 06:03 


 


WBC  8.36    (4.0-11.0)  K/uL


 


RBC  4.82    (4.30-5.90)  M/uL


 


Hgb  14.3    (12.0-16.0)  g/dL


 


Hct  45.9    (36.0-46.0)  %


 


MCV  95.2    (80.0-98.0)  fL


 


MCH  29.7    (27.0-32.0)  pg


 


MCHC  31.2    (31.0-37.0)  g/dL


 


RDW Std Deviation  61.9    (28.0-62.0)  fl


 


RDW Coeff of Jason  18 H    (11.0-15.0)  %


 


Plt Count  104 L    (150-400)  K/uL


 


MPV  10.00    (7.40-12.00)  fL


 


Neut % (Auto)  89.8 H    (48.0-80.0)  %


 


Lymph % (Auto)  5.4 L    (16.0-40.0)  %


 


Mono % (Auto)  4.7    (0.0-15.0)  %


 


Eos % (Auto)  0.0    (0.0-7.0)  %


 


Baso % (Auto)  0.1    (0.0-1.5)  %


 


Neut # (Auto)  7.5 H    (1.4-5.7)  K/uL


 


Lymph # (Auto)  0.5 L    (0.6-2.4)  K/uL


 


Mono # (Auto)  0.4    (0.0-0.8)  K/uL


 


Eos # (Auto)  0.0    (0.0-0.7)  K/uL


 


Baso # (Auto)  0.0    (0.0-0.1)  K/uL


 


Nucleated RBC %  0.0    /100WBC


 


Nucleated RBCs #  0    K/uL


 


INR     


 


D-Dimer, Quantitative     (0.0-0.50)  mg/L FEU


 


ABG pH     (7.35-7.45)  


 


ABG pCO2     (35-45)  mmHG


 


ABG pO2     ()  mmHG


 


ABG HCO3     (22-26)  mEq/L


 


ABG Total CO2     


 


ABG Base Excess     (-2.0-2.0)  


 


Lactate     (0.20-2.00)  mmol/L


 


Sodium   139   (136-145)  mmol/L


 


Potassium   4.3   (3.5-5.1)  mmol/L


 


Chloride   107   ()  mmol/L


 


Carbon Dioxide   27.2   (21.0-32.0)  mmol/L


 


BUN   12   (7.0-18.0)  mg/dL


 


Creatinine   0.7   (0.6-1.0)  mg/dL


 


Est Cr Clr Drug Dosing   87.27   mL/min


 


Estimated GFR (MDRD)   > 60.0   ml/min


 


Glucose   182 H   ()  mg/dL


 


Hemoglobin A1c    6.3 H  (4.5-6.2)  %


 


Calcium   7.2 L   (8.5-10.1)  mg/dL


 


Total Bilirubin   0.7   (0.2-1.0)  mg/dL


 


AST   25   (15-37)  IU/L


 


ALT   21   (14-63)  IU/L


 


Alkaline Phosphatase   94   ()  U/L


 


Troponin I     (0.000-0.056)  ng/mL


 


B-Natriuretic Peptide     (<100)  PG/ML


 


Total Protein   5.4 L   (6.4-8.2)  g/dL


 


Albumin   2.0 L   (3.4-5.0)  g/dL


 


Globulin   3.4   (2.6-4.0)  g/dL


 


Albumin/Globulin Ratio   0.6 L   (0.9-1.6)  











G[facility]RIEL Results - Last 24 hrs: 


 Microbiology











 10/29/19 16:05 Anaerobic Blood Culture - Preliminary





 Blood - Venous - Lab Draw 


 


 10/29/19 15:50 Influenza Type A Antigen Screen - Final





 Nasopharyngeal Swab    NEGATIVE INFLUENZA A VIRUS AG





    REFERENCE RANGE: NEGATIVE





 Influenza Type B Antigen Screen - Final





    NEGATIVE INFLUENZA B VIRUS AG





    REFERENCE RANGE: NEGATIVE











Med Orders - Current: 


 Current Medications








Discontinued Medications





Acetaminophen (Tylenol)  650 mg PO Q4H PRN


   PRN Reason: Pain (Mild 1-3)/fever


Hydrocodone Bitart/Acetaminophen (Norco 325-5 Mg)  1 tab PO Q4H PRN


   PRN Reason: Pain (moderate 4-6)


Albuterol/Ipratropium (Duoneb 3.0-0.5 Mg/3 Ml)  3 ml NEB ONETIME ONE


   Stop: 10/29/19 15:35


   Last Admin: 10/29/19 15:50 Dose:  3 ml


Albuterol/Ipratropium (Duoneb 3.0-0.5 Mg/3 Ml)  3 ml NEB Q4HRRT PRN


   PRN Reason: Shortness Of Breath/wheezing


   Last Admin: 10/30/19 01:09 Dose:  3 ml


Alteplase, Recombinant (Activase) Confirm Administered Dose 100 mg .ROUTE .STK-

MED ONE


   Stop: 10/30/19 06:45


   Last Admin: 10/30/19 07:57 Dose:  Not Given


Amitriptyline HCl (Elavil)  100 mg PO BEDTIME [place]


   Last Admin: 10/29/19 22:52 Dose:  100 mg


Enoxaparin Sodium (Lovenox)  900 mg SUBCUT ONETIME ONE


   Stop: 10/29/19 17:15


   Last Admin: 10/29/19 17:50 Dose:  Not Given


Enoxaparin Sodium (Lovenox)  90 mg SUBCUT ONETIME ONE


   Stop: 10/29/19 17:26


   Last Admin: 10/29/19 17:31 Dose:  90 mg


Enoxaparin Sodium (Lovenox)  90 mg SUBCUT Q12H [place]


Enoxaparin Sodium (Lovenox)  100 mg SUBCUT Q12H [place]


   Last Admin: 10/30/19 07:46 Dose:  Not Given


Sodium Chloride (Normal Saline)  1,000 mls @ 999 mls/hr IV STAT ONE


   Stop: 10/29/19 16:47


   Last Admin: 10/29/19 16:07 Dose:  999 mls/hr


Sodium Chloride (Normal Saline)  1,000 mls @ 999 mls/hr IV .Bolus ONE


   Stop: 10/29/19 18:40


   Last Admin: 10/29/19 17:52 Dose:  999 mls/hr


Sodium Chloride (Normal Saline)  1,000 mls @ 999 mls/hr IV STAT ONE


   Stop: 10/29/19 19:29


   Last Admin: 10/29/19 18:58 Dose:  999 mls/hr


Sodium Chloride (Normal Saline)  1,000 mls @ 125 mls/hr IV ASDIRECTED [place]


   Last Admin: 10/29/19 20:00 Dose:  125 mls/hr


Cefepime HCl 1 gm/ Premix  50 mls @ 100 mls/hr IV Q8H [place]


   Last Admin: 10/30/19 03:55 Dose:  100 mls/hr


Cefepime HCl 1 gm/ Premix  50 mls @ 100 mls/hr IV ONETIME ONE


   Stop: 10/29/19 20:29


   Last Admin: 10/29/19 20:10 Dose:  100 mls/hr


Azithromycin 500 mg/ Sodium (Chloride)  250 mls @ 250 mls/hr IV Q24H [place]


   Stop: 11/04/19 23:59


   Last Admin: 10/30/19 00:48 Dose:  250 mls/hr


Propofol (Diprivan 100 Ml)  100 mls @ 5.88 mls/hr IV TITRATE [place]; Protocol


   Last Titration: 10/30/19 05:40 Dose:  20 mcg/kg/min, 11.76 mls/hr


Propofol (Diprivan 100 Ml)  100 mls @ 2.94 mls/hr IV TITRATE [place]; Protocol


Norepinephrine Bitartrate (Norepinephr-0.9% Nacl 4 Mg/250) Confirm Administered 

Dose 4 mg in 250 mls @ as directed IV .STK-MED ONE


   Stop: 10/30/19 05:29


   Last Admin: 10/30/19 05:30 Dose:  18 mls/hr


Influenza Virus Vaccine (Pharmacy To Dose - Influenza Vaccine)  1 each IM 

ONETIME ONE


   Stop: 10/29/19 19:50


Influenza Virus Vaccine (Fluzone Quad [id number] Syringe)  60 mcg IM .ONCE ONE


   Stop: 10/29/19 20:01


Iopamidol (Isovue Multipack-370 (76%))  80 ml IVPUSH ONETIME STA


   Stop: 10/29/19 16:57


   Last Admin: 10/29/19 16:57 Dose:  80 ml


Lorazepam (Ativan)  1 mg PO Q4H PRN


   PRN Reason: Anxiety


Lorazepam (Ativan)  0.25 mg PO Q4H PRN


   PRN Reason: Anxiety


Lorazepam (Ativan)  0.25 mg PO Q4H PRN


   PRN Reason: Anxiety


   Last Admin: 10/29/19 22:10 Dose:  0.25 mg


Methylprednisolone Sodium Succinate (Solu-Medrol)  125 mg IVPUSH ONETIME ONE


   Stop: 10/29/19 15:35


   Last Admin: 10/29/19 16:04 Dose:  125 mg


Morphine Sulfate (Morphine)  2 mg IVPUSH ONETIME ONE


   Stop: 10/29/19 17:23


   Last Admin: 10/29/19 17:31 Dose:  2 mg


Morphine Sulfate (Morphine)  2 mg IVPUSH Q2H PRN


   PRN Reason: Pain (severe 7-10)


   Stop: 10/30/19 18:05


Morphine Sulfate (Morphine)  2 mg IVPUSH Q2H PRN


   PRN Reason: Pain (severe 7-10)


   Stop: 10/30/19 18:05


   Last Admin: 10/29/19 21:15 Dose:  2 mg


Nicotine (Habitrol)  14 mg TRDERM DAILY [place]


   Last Admin: 10/29/19 20:02 Dose:  14 mg


Ondansetron HCl (Zofran Odt)  4 mg PO Q4H PRN


   PRN Reason: nausea, able to take PO


Ondansetron HCl (Zofran)  4 mg IVPUSH Q4H PRN


   PRN Reason: Nausea


Sodium Bicarbonate (Sodium Bicarbonate 8.4%)  50 meq IVPUSH ONETIME ONE


   Stop: 10/30/19 04:43


   Last Admin: 10/30/19 04:49 Dose:  50 meq


Sodium Chloride (Saline Flush)  10 ml FLUSH ASDIRECTED PRN


   PRN Reason: Keep Vein Open


Sodium Chloride (Saline Flush)  2.5 ml FLUSH ASDIRECTED PRN


   PRN Reason: Keep Vein Open


Temazepam (Restoril)  15 mg PO BEDTIME PRN


   PRN Reason: Sleep

## 2019-10-30 NOTE — PCM.SN
- Free Text/Narrative


Note: 





Nurse informed me around 4:30 am that patients oxygen requirements have been 

increasing from 5L to high flow 12 lts and is somnolent and she is only 

arousable with a deep sternal rub.  ABG showed hypoxic, hypercapnic respiratory 

failure. I came in to evaluate the patient immediately, Anesthesia was called 

for immediate intubation. Repeat x-ray post intubation showed worsening right 

lung opacities, and worsening effusion. Post Intubation patients BP dropped, so 

patient was started on peripheral Levophed. Systemic TPA administration was 

held off sec. to concern of of worsening bleeding (pulmonary hemorrhage). 

Hemodynamics improved on vasopressors, patient was stable enough to be 

transferred out to higher level of care at [facility], for possible 

thrombectomy. Family was informed over the phone, and agreeable for transfer 

and i also discussed TPA administration as a last life saving resort in case 

patient becomes hemodynamically unstable on route to [place].

## 2020-07-10 NOTE — CR
Right foot: 3 views of the right foot were obtained.

 

Comparison: No previous study.

 

Soft tissue swelling is noted dorsally.  No discrete fracture, 

dislocation or other bony abnormality is appreciated.

 

Impression:

1.  Dorsal soft tissue swelling.

2.  No acute bony abnormality is appreciated.

 

Diagnostic code #1

 

This report was dictated in MDT

## 2020-07-10 NOTE — CR
Right ankle: 2 views of the right ankle were obtained.

 

Comparison: No prior ankle study.

 

Soft tissue swelling is noted.  Ankle mortise is symmetric.  No acute 

fracture or other abnormality is appreciated.

 

Impression:

1.  Soft tissue swelling. 

2.  No acute bony abnormality is appreciated on 2 view study.

 

Diagnostic code #2

 

This report was dictated in MDT

## 2020-07-10 NOTE — EDM.PDOC
ED HPI GENERAL MEDICAL PROBLEM





- General


Chief Complaint: Lower Extremity Injury/Pain


Stated Complaint: BROKEN RT FOOT


Time Seen by Provider: 07/10/20 13:59





- History of Present Illness


INITIAL COMMENTS - FREE TEXT/NARRATIVE: 





History of present illness:


History of present illness:


[] Patient presents to the ED with complaints of right ankle and foot pain 

apparently 2 nights ago she was climbing a chair to get something off the top 

shelf in the kitchen she fell she mentions something about being seen by  

somewhere else in [place] but she presents here now with right ankle and right 

foot pain no new injuries this occurred 2 nights ago at that initial injury she 

denies any her head no other complaints at this time she says she has severe 

pain in the right ankle and cannot bear weight there is ecchymosis and swelling 

there.  Nothing seems to make it better or worse





Review of systems: 


As per history of present illness and below otherwise all systems reviewed and 

negative.





Past medical history: 


As per history of present illness and as reviewed below otherwise 

noncontributory.





Surgical history: 


As per history of present illness and as reviewed below otherwise 

noncontributory.





Social history: 


No reported history of drug or alcohol abuse.





Family history: 


As per history of present illness and as reviewed below otherwise 

noncontributory.





Physical exam:


HEENT: Atraumatic, normocephalic, pupils reactive, negative for conjunctival 

pallor or scleral icterus, mucous membranes moist, throat clear, neck supple, 

nontender, trachea midline.


Lungs: Clear to auscultation, breath sounds equal bilaterally, chest nontender.


Heart: S1S2, regular, negative for clicks, rubs, or JVD.


Abdomen: Soft, nondistended, nontender. Negative for masses or 

hepatosplenomegaly. Negative for costovertebral tenderness.


Pelvis: Stable nontender.


Genitourinary: Deferred.


Rectal: Deferred.


Extremities: Atraumatic, negative for cords or calf pain. Neurovascular 

unremarkable.  The right ankle is swollen edematous ecchymotic and there is 

tenderness to the lateral malleolus and the anterior dorsal foot.  There is good

 cap refill and pulses sensations intact.


Neuro: Awake, alert, oriented. Cranial nerves II through XII unremarkable. 

Cerebellum unremarkable. Motor and sensory unremarkable throughout. Exam 

nonfocal.





Diagnostics:


[]





Therapeutics:


[]





Impression: 


[]





Plan: X-ray reevaluate medicine for pain splinting


[]





Definitive disposition and diagnosis as appropriate pending reevaluation and 

review of above.








Review of systems: 


As per history of present illness and below otherwise all systems reviewed and 

negative.





Past medical history: 


As per history of present illness and as reviewed below otherwise 

noncontributory.





Surgical history: 


As per history of present illness and as reviewed below otherwise nonco

ntributory.





Social history: 


No reported history of drug or alcohol abuse.





Family history: 


As per history of present illness and as reviewed below otherwise 

noncontributory.





Physical exam:


HEENT: Atraumatic, normocephalic, pupils reactive, negative for conjunctival 

pallor or scleral icterus, mucous membranes moist, throat clear, neck supple, 

nontender, trachea midline.


Lungs: Clear to auscultation, breath sounds equal bilaterally, chest nontender.


Heart: S1S2, regular, negative for clicks, rubs, or JVD.


Abdomen: Soft, nondistended, nontender. Negative for masses or 

hepatosplenomegaly. Negative for costovertebral tenderness.


Pelvis: Stable nontender.


Genitourinary: Deferred.


Rectal: Deferred.


Extremities: Atraumatic, negative for cords or calf pain. Neurovascular 

unremarkable.


Neuro: Awake, alert, oriented. Cranial nerves II through XII unremarkable. 

Cerebellum unremarkable. Motor and sensory unremarkable throughout. Exam 

nonfocal.





Diagnostics:


[]





Therapeutics:


[]





Impression: 


[]





Plan:


[]





Definitive disposition and diagnosis as appropriate pending reevaluation and 

review of above.





  ** Right Ankle


Pain Score (Numeric/FACES): 10





- Related Data


                                    Allergies











Allergy/AdvReac Type Severity Reaction Status Date / Time


 


codeine Allergy  Hives Verified 07/10/20 14:12


 


pseudoephedrine Allergy  palpitation Verified 07/10/20 14:12





   s  


 


Sulfa (Sulfonamide Allergy  unknown Verified 07/10/20 14:12





Antibiotics)     











Home Meds: 


                                    Home Meds





ClonazePAM [KlonoPIN] 2 mg PO TID PRN 05/31/18 [History]


Aspirin [Ecotrin EC] 4 tab PO DAILY 10/29/19 [History]


Acetaminophen/HYDROcodone [Norco 325-5 MG] 1 tab PO Q6H #12 tablet 07/10/20 [Rx]


Magnesium 800 mg PO DAILY 07/10/20 [History]


Naproxen [Naprosyn] 500 mg PO Q12HR #20 tab 07/10/20 [Rx]











Past Medical History


Respiratory History: Reports: SOB


OB/GYN History: Reports: Pregnancy


Psychiatric History: Reports: Anxiety, Depression, PTSD





- Infectious Disease History


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 

Enterobacteriaceae, Measles, Mumps





- Past Surgical History


HEENT Surgical History: Reports: Adenoidectomy, Tonsillectomy


GI Surgical History: Reports: Appendectomy, Cholecystectomy, Colostomy, Hernia, 

Abdominal, Other (See Below)


Other GI Surgeries/Procedures: prior colostomy and ileostomy


Female  Surgical History: Reports: Hysterectomy, Salpingo-Oophorectomy


Musculoskeletal Surgical History: Reports: Other (See Below)


Other Musculoskeletal Surgeries/Procedures:: right thumb surgery





Social & Family History





- Family History


Family Medical History: Noncontributory





- Caffeine Use


Caffeine Use: Reports: None





Review of Systems





- Review of Systems


Review Of Systems: See Below





ED EXAM, GENERAL





- Physical Exam


Exam: See Below





Course





- Vital Signs


Text/Narrative:: 





3 view foot 2 view ankle read and interpreted by me there appears to be a 

fracture of the intermediate cuneiform however radiology does not see any 

fractures she is certainly tender here and has excessive amounts of swelling and

 ecchymosis so she will be put into a walking boot with crutches discharged home

 on [address] and referred to orthopedics.


Last Recorded V/S: 


                                Last Vital Signs











Temp  36.8 C   07/10/20 14:30


 


Pulse  98   07/10/20 14:30


 


Resp  18   07/10/20 14:30


 


BP  115/63   07/10/20 14:30


 


Pulse Ox  96   07/10/20 14:30














- Orders/Labs/Meds


Orders: 


                               Active Orders 24 hr











 Category Date Time Status


 


 DME for Discharge [COMM] Stat Oth  07/10/20 14:41 Ordered


 


 DME for Discharge [COMM] Stat Oth  07/10/20 14:42 Ordered











Meds: 


Medications














Discontinued Medications














Generic Name Dose Route Start Last Admin





  Trade Name [place]  PRN Reason Stop Dose Admin


 


Ibuprofen  800 mg  07/10/20 14:13  07/10/20 14:29





  Motrin  PO  07/10/20 14:14  800 mg





  ONETIME ONE   Administration














Departure





- Departure


Time of Disposition: 15:40


Disposition: Home, Self-Care 01


Condition: Good


Clinical Impression: 


 Foot fracture, right, Closed traumatic dislocation of hip








- Discharge Information


*PRESCRIPTION DRUG MONITORING PROGRAM REVIEWED*: Not Applicable


*COPY OF PRESCRIPTION DRUG MONITORING REPORT IN PATIENT SHERLY: Not Applicable


Instructions:  Cuneiform Fracture


Referrals: 


Quinton Lorenzo MD [Primary Care Provider] - 


Forms:  ED Department Discharge


Additional Instructions: 


The following information is given to patients seen in the emergency department 

who are being discharged to home. This information is to outline your options 

for follow-up care. We provide all patients seen in our emergency department 

with a follow-up referral.





The need for follow-up, as well as the timing and circumstances, are variable 

depending upon the specifics of your emergency department visit.





If you don't have a primary care physician on staff, we will provide you with a 

referral. We always advise you to contact your personal physician following an 

emergency department visit to inform them of the circumstance of the visit and 

for follow-up with them and/or the need for any referrals to a consulting 

specialist.





The emergency department will also refer you to a specialist when appropriate. 

This referral assures that you have the opportunity for follow-up care with a 

specialist. All of these measure are taken in an effort to provide you with 

optimal care, which includes your follow-up.





Under all circumstances we always encourage you to contact your private 

physician who remains a resource for coordinating your care. When calling for 

follow-up care, please make the office aware that this follow-up is from your 

recent emergency room visit. If for any reason you are refused follow-up, please

contact the [facility] Emergency Department

at [phone] and asked to speak to the emergency department charge nurse.





[facility] - Orth[facility]


[address]


[address] [place] [address], [address]


[place] [address]


Phone: [phone]


Fax: [phone]











Sepsis Event Note (ED)





- Focused Exam


Vital Signs: 


                                   Vital Signs











  Temp Pulse Resp BP Pulse Ox


 


 07/10/20 14:30  36.8 C  98  18  115/63  96


 


 07/10/20 14:07  36.2 C  98  18  115/63  92 L














- My Orders


Last 24 Hours: 


My Active Orders





07/10/20 14:41


DME for Discharge [COMM] Stat 





07/10/20 14:42


DME for Discharge [COMM] Stat 














- Assessment/Plan


Last 24 Hours: 


My Active Orders





07/10/20 14:41


DME for Discharge [COMM] Stat 





07/10/20 14:42


DME for Discharge [COMM] Stat

## 2020-07-27 NOTE — PCM.HP.2
<Sandro Shore M - Last Filed: 20 20:26>





H&P History of Present Illness





- General


Date of Service: 20


Admit Problem/Dx: 


                           Admission Diagnosis/Problem





Admission Diagnosis/Problem      Acute encephalopathy








Source of Information: Patient


History Limitations: Reports: Altered Mental Status





- History of Present Illness


Initial Comments - Free Text/Narative: 





54-year-old female complaining of fatigue, shortness of breath and frequent 

falls for the past few days. History taking limited secondary to patient's 

altered mental status. She has a PMH of pulmonary embolism, anxiety, PTSD and 

tobacco abuse. Patient was brought to hospital by her  who noted that 

patient has been confused and has been falling frequently. Per ED note,  

further reports that she has had slurred speech and difficulty walking. Patient 

has also been inquiring about  relatives and pets. Patient is alert to 

person and place during interview. She reports that she has a history of COPD 

and uses oxygen at home. She states that her oxygen level at baseline is 3.5 L. 

She denies having any fevers, chills, vomiting or dysuria. 





In the ED, CT head and CXR were negative. UA showed positive nitrites and 

esterase. UDS positive for oxycodone and ethanol level normal. COVID19 test 

negative. VBG: pCO2 53, pO2 29, HCO3 32. Lactate level normal. Patient given 

dose of IV ceftriaxone. Patient admitted for further evaluation and treatment. 





- Related Data


Allergies/Adverse Reactions: 


                                    Allergies











Allergy/AdvReac Type Severity Reaction Status Date / Time


 


codeine Allergy  Hives Verified 20 16:25


 


pseudoephedrine Allergy  palpitation Verified 20 16:25





   s  


 


Sulfa (Sulfonamide Allergy  unknown Verified 20 16:25





Antibiotics)     











Home Medications: 


                                    Home Meds





ClonazePAM [KlonoPIN] 2 mg PO TID PRN 18 [History]


Naproxen [Naprosyn] 500 mg PO Q12HR #20 tab 07/10/20 [Rx]


Apixaban [Eliquis] 5 mg PO BID 20 [History]


Budesonide [Pulmicort] 1 vial IH BID 20 [History]


Umeclidinium Brm/Vilanterol Tr [Anoro Ellipta 62.5-25 MCG] 1 puff IH DAILY 

20 [History]


Ciprofloxacin [Ciprofloxacin HCl] 500 mg PO BID 3 Days #6 tab 20 [Rx]


Iron Polysaccharides Complex [Ferrex 150] 150 mg PO DAILY 30 Days #30 cap 

20 [Rx]











Past Medical History


Respiratory History: Reports: SOB


OB/GYN History: Reports: Pregnancy


Psychiatric History: Reports: Anxiety, Depression, PTSD





- Infectious Disease History


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 

Enterobacteriaceae, Measles, Mumps





- Past Surgical History


HEENT Surgical History: Reports: Adenoidectomy, Tonsillectomy


GI Surgical History: Reports: Appendectomy, Cholecystectomy, Colostomy, Hernia, 

Abdominal, Other (See Below)


Other GI Surgeries/Procedures: prior colostomy and ileostomy


Female  Surgical History: Reports: Hysterectomy, Salpingo-Oophorectomy


Musculoskeletal Surgical History: Reports: Other (See Below)


Other Musculoskeletal Surgeries/Procedures:: right thumb surgery





Social & Family History





- Family History


Family Medical History: Noncontributory





- Tobacco Use


Smoking Status *Q: Former Smoker


Used Tobacco, but Quit: Yes


Month/Year Tobacco Last Used: 2020





- Caffeine Use


Caffeine Use: Reports: None





- Recreational Drug Use


Recreational Drug Use: No





H&P Review of Systems





- Review of Systems:


Review Of Systems: Comprehensive ROS is negative, except as noted in HPI. (ROS 

limited secondary to patient's mentation)





Exam





- Exam


Exam: See Below





- Vital Signs


Vital Signs: 


                                Last Vital Signs











Temp  35.8 C L  20 16:19


 


Pulse  87   20 17:45


 


Resp  14   20 17:45


 


BP  127/72   20 17:45


 


Pulse Ox  96   20 17:45











Weight: 90.718 kg





- Exam


General: Alert, Cooperative, Other (awake, alert, oriented to person and place. 

Slow to respond.)


HEENT: Conjunctiva Clear, EOMI, Hearing Intact, Pupils Equal


Lungs: Clear to Auscultation, Normal Respiratory Effort


Cardiovascular: Regular Rate, Regular Rhythm


GI/Abdominal Exam: Normal Bowel Sounds, Soft, Non-Tender, No Distention


Extremities: Other (Right foot is edematous and erythematous)


Peripheral Pulses: 2+: Radial (L), Radial (R)


Skin: Other (There are two circular lesions measuring approximately 4 cm x 4 cm 

with black-colored eschar on right foot, no drainage appreciated)


Neuro Extensive - Mental Status: Alert, Disorientation to Time, Slow Response to

 Commands


Neuro Extensive - Motor, Sensory, Reflexes: CN II-XII Intact, Other (Moves all 

extremities on command)





- Patient Data


Lab Results Last 24 hrs: 


                         Laboratory Results - last 24 hr











  20 Range/Units





  16:40 16:40 16:40 


 


WBC    4.65  (4.0-11.0)  K/uL


 


RBC    4.40  (4.30-5.90)  M/uL


 


Hgb    9.9 L  (12.0-16.0)  g/dL


 


Hct    34.5 L  (36.0-46.0)  %


 


MCV    78.4 L  (80.0-98.0)  fL


 


MCH    22.5 L  (27.0-32.0)  pg


 


MCHC    28.7 L  (31.0-37.0)  g/dL


 


RDW Std Deviation    59.3  (28.0-62.0)  fl


 


RDW Coeff of Jason    21 H  (11.0-15.0)  %


 


Plt Count    104 L  (150-400)  K/uL


 


MPV    10.30  (7.40-12.00)  fL


 


Neut % (Auto)    55.3  (48.0-80.0)  %


 


Lymph % (Auto)    36.3  (16.0-40.0)  %


 


Mono % (Auto)    7.3  (0.0-15.0)  %


 


Eos % (Auto)    0.9  (0.0-7.0)  %


 


Baso % (Auto)    0.2  (0.0-1.5)  %


 


Neut # (Auto)    2.6  (1.4-5.7)  K/uL


 


Lymph # (Auto)    1.7  (0.6-2.4)  K/uL


 


Mono # (Auto)    0.3  (0.0-0.8)  K/uL


 


Eos # (Auto)    0.0  (0.0-0.7)  K/uL


 


Baso # (Auto)    0.0  (0.0-0.1)  K/uL


 


Nucleated RBC %    0.0  /100WBC


 


Nucleated RBCs #    0  K/uL


 


INR  1.15    


 


VBG pH     (7.31-7.41)  


 


VBG pCO2     (35-45)  mmHG


 


VBG pO2     (30-40)  mmHG


 


VBG HCO3     (22-30)  mEq/L


 


VBG Total CO2     (41-51)  mmol/L


 


VBG Base Excess     (-3.0-3.0)  


 


Lactate     (0.20-2.00)  mmol/L


 


Sodium   139   (136-145)  mmol/L


 


Potassium   3.1 L   (3.5-5.1)  mmol/L


 


Chloride   102   ()  mmol/L


 


Carbon Dioxide   33.3 H   (21.0-32.0)  mmol/L


 


BUN   7   (7.0-18.0)  mg/dL


 


Creatinine   0.9   (0.6-1.0)  mg/dL


 


Est Cr Clr Drug Dosing   TNP   


 


Estimated GFR (MDRD)   > 60.0   ml/min


 


Glucose   83   ()  mg/dL


 


Calcium   8.2 L   (8.5-10.1)  mg/dL


 


Total Bilirubin   1.6 H   (0.2-1.0)  mg/dL


 


AST   28   (15-37)  IU/L


 


ALT   36   (14-63)  IU/L


 


Alkaline Phosphatase   142 H   ()  U/L


 


Ammonia     (19-54)  ug/dL


 


Troponin I   < 0.050   (0.000-0.056)  ng/mL


 


Total Protein   6.7   (6.4-8.2)  g/dL


 


Albumin   2.8 L   (3.4-5.0)  g/dL


 


Globulin   3.9   (2.6-4.0)  g/dL


 


Albumin/Globulin Ratio   0.7 L   (0.9-1.6)  


 


Urine Color     


 


Urine Appearance     


 


Urine pH     (5.0-8.0)  


 


Ur Specific Gravity     (1.001-1.035)  


 


Urine Protein     (NEGATIVE)  mg/dL


 


Urine Glucose (UA)     (NEGATIVE)  mg/dL


 


Urine Ketones     (NEGATIVE)  mg/dL


 


Urine Occult Blood     (NEGATIVE)  


 


Urine Nitrite     (NEGATIVE)  


 


Urine Bilirubin     (NEGATIVE)  


 


Urine Ictotest     


 


Urine Urobilinogen     (<2.0)  EU/dL


 


Ur Leukocyte Esterase     (NEGATIVE)  


 


Urine RBC     (0-2/HPF)  


 


Urine WBC     (0-5/HPF)  


 


Ur Epithelial Cells     (NONE-FEW)  


 


Urine Bacteria     (NEGATIVE)  


 


Urine Opiates Screen     (NEGATIVE)  


 


Ur Oxycodone Screen     (NEGATIVE)  


 


Urine Methadone Screen     (NEGATIVE)  


 


Ur Barbiturates Screen     (NEGATIVE)  


 


Ur Phencyclidine Scrn     (NEGATIVE)  


 


Ur Amphetamine Screen     (NEGATIVE)  


 


U Methamphetamines Scrn     (NEGATIVE)  


 


U Benzodiazepines Scrn     (NEGATIVE)  


 


U Cocaine Metab Screen     (NEGATIVE)  


 


U Marijuana (THC) Screen     (NEGATIVE)  


 


Ethyl Alcohol   < 3.0   mg/dL














  20 Range/Units





  17:13 17:13 17:13 


 


WBC     (4.0-11.0)  K/uL


 


RBC     (4.30-5.90)  M/uL


 


Hgb     (12.0-16.0)  g/dL


 


Hct     (36.0-46.0)  %


 


MCV     (80.0-98.0)  fL


 


MCH     (27.0-32.0)  pg


 


MCHC     (31.0-37.0)  g/dL


 


RDW Std Deviation     (28.0-62.0)  fl


 


RDW Coeff of Jason     (11.0-15.0)  %


 


Plt Count     (150-400)  K/uL


 


MPV     (7.40-12.00)  fL


 


Neut % (Auto)     (48.0-80.0)  %


 


Lymph % (Auto)     (16.0-40.0)  %


 


Mono % (Auto)     (0.0-15.0)  %


 


Eos % (Auto)     (0.0-7.0)  %


 


Baso % (Auto)     (0.0-1.5)  %


 


Neut # (Auto)     (1.4-5.7)  K/uL


 


Lymph # (Auto)     (0.6-2.4)  K/uL


 


Mono # (Auto)     (0.0-0.8)  K/uL


 


Eos # (Auto)     (0.0-0.7)  K/uL


 


Baso # (Auto)     (0.0-0.1)  K/uL


 


Nucleated RBC %     /100WBC


 


Nucleated RBCs #     K/uL


 


INR     


 


VBG pH    7.38  (7.31-7.41)  


 


VBG pCO2    53 H  (35-45)  mmHG


 


VBG pO2    29 L  (30-40)  mmHG


 


VBG HCO3    32 H  (22-30)  mEq/L


 


VBG Total CO2    30 L  (41-51)  mmol/L


 


VBG Base Excess    5.7 H  (-3.0-3.0)  


 


Lactate  1.2    (0.20-2.00)  mmol/L


 


Sodium     (136-145)  mmol/L


 


Potassium     (3.5-5.1)  mmol/L


 


Chloride     ()  mmol/L


 


Carbon Dioxide     (21.0-32.0)  mmol/L


 


BUN     (7.0-18.0)  mg/dL


 


Creatinine     (0.6-1.0)  mg/dL


 


Est Cr Clr Drug Dosing     


 


Estimated GFR (MDRD)     ml/min


 


Glucose     ()  mg/dL


 


Calcium     (8.5-10.1)  mg/dL


 


Total Bilirubin     (0.2-1.0)  mg/dL


 


AST     (15-37)  IU/L


 


ALT     (14-63)  IU/L


 


Alkaline Phosphatase     ()  U/L


 


Ammonia   27   (19-54)  ug/dL


 


Troponin I     (0.000-0.056)  ng/mL


 


Total Protein     (6.4-8.2)  g/dL


 


Albumin     (3.4-5.0)  g/dL


 


Globulin     (2.6-4.0)  g/dL


 


Albumin/Globulin Ratio     (0.9-1.6)  


 


Urine Color     


 


Urine Appearance     


 


Urine pH     (5.0-8.0)  


 


Ur Specific Gravity     (1.001-1.035)  


 


Urine Protein     (NEGATIVE)  mg/dL


 


Urine Glucose (UA)     (NEGATIVE)  mg/dL


 


Urine Ketones     (NEGATIVE)  mg/dL


 


Urine Occult Blood     (NEGATIVE)  


 


Urine Nitrite     (NEGATIVE)  


 


Urine Bilirubin     (NEGATIVE)  


 


Urine Ictotest     


 


Urine Urobilinogen     (<2.0)  EU/dL


 


Ur Leukocyte Esterase     (NEGATIVE)  


 


Urine RBC     (0-2/HPF)  


 


Urine WBC     (0-5/HPF)  


 


Ur Epithelial Cells     (NONE-FEW)  


 


Urine Bacteria     (NEGATIVE)  


 


Urine Opiates Screen     (NEGATIVE)  


 


Ur Oxycodone Screen     (NEGATIVE)  


 


Urine Methadone Screen     (NEGATIVE)  


 


Ur Barbiturates Screen     (NEGATIVE)  


 


Ur Phencyclidine Scrn     (NEGATIVE)  


 


Ur Amphetamine Screen     (NEGATIVE)  


 


U Methamphetamines Scrn     (NEGATIVE)  


 


U Benzodiazepines Scrn     (NEGATIVE)  


 


U Cocaine Metab Screen     (NEGATIVE)  


 


U Marijuana (THC) Screen     (NEGATIVE)  


 


Ethyl Alcohol     mg/dL














  20 Range/Units





  18:09 18:09 


 


WBC    (4.0-11.0)  K/uL


 


RBC    (4.30-5.90)  M/uL


 


Hgb    (12.0-16.0)  g/dL


 


Hct    (36.0-46.0)  %


 


MCV    (80.0-98.0)  fL


 


MCH    (27.0-32.0)  pg


 


MCHC    (31.0-37.0)  g/dL


 


RDW Std Deviation    (28.0-62.0)  fl


 


RDW Coeff of Jason    (11.0-15.0)  %


 


Plt Count    (150-400)  K/uL


 


MPV    (7.40-12.00)  fL


 


Neut % (Auto)    (48.0-80.0)  %


 


Lymph % (Auto)    (16.0-40.0)  %


 


Mono % (Auto)    (0.0-15.0)  %


 


Eos % (Auto)    (0.0-7.0)  %


 


Baso % (Auto)    (0.0-1.5)  %


 


Neut # (Auto)    (1.4-5.7)  K/uL


 


Lymph # (Auto)    (0.6-2.4)  K/uL


 


Mono # (Auto)    (0.0-0.8)  K/uL


 


Eos # (Auto)    (0.0-0.7)  K/uL


 


Baso # (Auto)    (0.0-0.1)  K/uL


 


Nucleated RBC %    /100WBC


 


Nucleated RBCs #    K/uL


 


INR    


 


VBG pH    (7.31-7.41)  


 


VBG pCO2    (35-45)  mmHG


 


VBG pO2    (30-40)  mmHG


 


VBG HCO3    (22-30)  mEq/L


 


VBG Total CO2    (41-51)  mmol/L


 


VBG Base Excess    (-3.0-3.0)  


 


Lactate    (0.20-2.00)  mmol/L


 


Sodium    (136-145)  mmol/L


 


Potassium    (3.5-5.1)  mmol/L


 


Chloride    ()  mmol/L


 


Carbon Dioxide    (21.0-32.0)  mmol/L


 


BUN    (7.0-18.0)  mg/dL


 


Creatinine    (0.6-1.0)  mg/dL


 


Est Cr Clr Drug Dosing    


 


Estimated GFR (MDRD)    ml/min


 


Glucose    ()  mg/dL


 


Calcium    (8.5-10.1)  mg/dL


 


Total Bilirubin    (0.2-1.0)  mg/dL


 


AST    (15-37)  IU/L


 


ALT    (14-63)  IU/L


 


Alkaline Phosphatase    ()  U/L


 


Ammonia    (19-54)  ug/dL


 


Troponin I    (0.000-0.056)  ng/mL


 


Total Protein    (6.4-8.2)  g/dL


 


Albumin    (3.4-5.0)  g/dL


 


Globulin    (2.6-4.0)  g/dL


 


Albumin/Globulin Ratio    (0.9-1.6)  


 


Urine Color  YELLOW   


 


Urine Appearance  SLT CLOUDY   


 


Urine pH  7.5   (5.0-8.0)  


 


Ur Specific Gravity  1.020   (1.001-1.035)  


 


Urine Protein  TRACE H   (NEGATIVE)  mg/dL


 


Urine Glucose (UA)  NEGATIVE   (NEGATIVE)  mg/dL


 


Urine Ketones  TRACE H   (NEGATIVE)  mg/dL


 


Urine Occult Blood  NEGATIVE   (NEGATIVE)  


 


Urine Nitrite  POSITIVE H   (NEGATIVE)  


 


Urine Bilirubin  SMALL H   (NEGATIVE)  


 


Urine Ictotest  NEGATIVE   


 


Urine Urobilinogen  >=8.0 H   (<2.0)  EU/dL


 


Ur Leukocyte Esterase  SMALL H   (NEGATIVE)  


 


Urine RBC  0-1   (0-2/HPF)  


 


Urine WBC  2-3   (0-5/HPF)  


 


Ur Epithelial Cells  OCCASIONAL   (NONE-FEW)  


 


Urine Bacteria  FEW   (NEGATIVE)  


 


Urine Opiates Screen   NEGATIVE  (NEGATIVE)  


 


Ur Oxycodone Screen   POSITIVE  (NEGATIVE)  


 


Urine Methadone Screen   NEGATIVE  (NEGATIVE)  


 


Ur Barbiturates Screen   NEGATIVE  (NEGATIVE)  


 


Ur Phencyclidine Scrn   NEGATIVE  (NEGATIVE)  


 


Ur Amphetamine Screen   NEGATIVE  (NEGATIVE)  


 


U Methamphetamines Scrn   NEGATIVE  (NEGATIVE)  


 


U Benzodiazepines Scrn   NEGATIVE  (NEGATIVE)  


 


U Cocaine Metab Screen   NEGATIVE  (NEGATIVE)  


 


U Marijuana (THC) Screen   NEGATIVE  (NEGATIVE)  


 


Ethyl Alcohol    mg/dL











Result Diagrams: 


                                 20 16:40





                                 20 16:40


[facility] Results Last 24 hrs: 


                                  Microbiology











 20 17:13 Anaerobic Blood Culture - Final





 Blood - Venous - Lab Draw 














Sepsis Event Note





- Evaluation


Sepsis Screening Result: No Definite Risk





- Focused Exam


Vital Signs: 


                                   Vital Signs











  Temp Pulse Resp BP Pulse Ox


 


 20 17:45   87  14  127/72  96


 


 20 16:19  35.8 C L  82  18  114/82  93 L











Date Exam was Performed: 20


Time Exam was Performed: 20:26


Problem List Initiated/Reviewed/Updated: Yes


Orders Last 24hrs: 


                               Active Orders 24 hr











 Category Date Time Status


 


 Admission Status [Patient Status] [ADT] Stat ADT  20 18:52 Active


 


 Antiembolic Devices [RC] PER UNIT ROUTINE Care  20 19:37 Active


 


 Cardiac Monitoring [RC] .AS DIRECTED Care  20 16:40 Active


 


 EKG Documentation Completion [RC] STAT Care  20 16:40 Active


 


 Oxygen Therapy [RC] PRN Care  20 19:35 Active


 


 Oxygen Therapy, ED [RC] ASDIRECTED Care  20 16:42 Active


 


 Pulse Oximetry [RC] ASDIRECTED Care  20 16:40 Active


 


 Up With Assistance [RC] ASDIRECTED Care  20 19:35 Active


 


 VTE/DVT Education [RC] PER UNIT ROUTINE Care  20 19:35 Active


 


 Vital Signs [RC] Q4H Care  20 19:35 Active


 


 Mechanical Soft Diet [DIET] Diet  20 Dinner Active


 


 Ang Chest [CT] Stat Exams  20 18:49 Ordered


 


 CORONAVIRUS COVID-19 RAPID [MOLEC] Stat Lab  20 19:07 Received


 


 CULTURE BLOOD [BC] Stat Lab  20 16:50 Received


 


 CULTURE BLOOD [BC] Stat Lab  20 17:13 Results


 


 CULTURE URINE [RM] Stat Lab  20 18:40 Ordered


 


 Acetaminophen [Tylenol] Med  20 19:35 Active





 650 mg PO Q4H PRN   


 


 Ondansetron [Zofran ODT] Med  20 19:35 Active





 4 mg PO Q4H PRN   


 


 Ondansetron [Zofran] Med  20 19:35 Active





 4 mg IVPUSH Q4H PRN   


 


 Potassium Chloride Riders [KCL 20 MEQ in Water 50 ML] Med  20 18:58 

Active





 20 meq   





 Premix Bag 1 bag   





 IV ONETIME   


 


 Sodium Chloride 0.9% [Saline Flush] Med  20 16:40 Active





 10 ml FLUSH ASDIRECTED PRN   


 


 Sodium Chloride 0.9% [Saline Flush] Med  20 16:40 Active





 2.5 ml FLUSH ASDIRECTED PRN   


 


 Blood Culture x2 Reflex Set [OM.PC] Stat Oth  20 16:43 Ordered


 


 Saline Lock Insert [OM.PC] Stat Oth  20 16:41 Ordered


 


 Sequential Compression Device [OM.PC] Per Unit Routine Oth  20 19:36 

Ordered


 


 Resuscitation Status Routine Resus Stat  20 19:35 Ordered








                                Medication Orders





Acetaminophen (Tylenol)  650 mg PO Q4H PRN


   PRN Reason: Pain (Mild 1-3)/fever


Potassium Chloride 20 meq/ (Premix)  50 mls @ 25 mls/hr IV ONETIME ONE


   Stop: 20 20:57


Ondansetron HCl (Zofran Odt)  4 mg PO Q4H PRN


   PRN Reason: nausea, able to take PO


Ondansetron HCl (Zofran)  4 mg IVPUSH Q4H PRN


   PRN Reason: Nausea


Sodium Chloride (Saline Flush)  10 ml FLUSH ASDIRECTED PRN


   PRN Reason: Keep Vein Open


   Last Admin: 20 17:56  Dose: 10 ml


   Documented by: [email or web address]


Sodium Chloride (Saline Flush)  2.5 ml FLUSH ASDIRECTED PRN


   PRN Reason: Keep Vein Open


   Last Admin: 20 17:56  Dose: 2.5 ml


   Documented by: [email or web address]








Assessment/Plan Comment:: 





Assessment and Plan:





1. Acute hypoxic and hypercapnic respiratory failure:


- CTA chest pending. Patient has history of pulmonary embolism. CXR 

unremarkable. 


- Continue supplemental oxygen. Will do trial of BiPAP and reassess tomorrow 

morning. 


- Patient is hemodynamically stable at this time.





2. Altered mental status likely secondary to hypoxic encephalopathy vs UTI:


- CT head negative. UA positive for nitrites and esterase. Will treat with IV 

ceftriaxone 1 g qd. Will continue to monitor.





3. Right foot injury:


- Right foot x-ray showed soft tissue swelling. Per ER provider note who 

reviewed x-ray, patient may have subacute fracture of 4th metatarsal. Of note, 

patient was seen in ER 2 weeks ago for right foot injury and placed in a walking

 boot. Orthopedic referral was provided, however, it is likely patient did not 

follow-up. Will have patient set up for orthopedic referral on discharge Will 

also consider CT for right foot.





4. Microcytic anemia:


- Will order iron studies. 





5. Hypokalemia:


- Patient received IV potassium in the ED. Will recheck with AM labs.





6. Hyperbilirubinemia:


- Will recheck with AM labs.





7. DVT prophylaxis: lovenox 40 mg subcut qd. 





8. Past medical history of PE, anxiety, depression and PTSD:


- Will hold klonopin and opioid medication because of patient's altered mental 

status. Will order ativan 1 mg q4 prn for chronic benzodiazepine use.


- Will need to request previous medical records for review.





<Akil Daugherty - Last Filed: 20 15:35>





H&P History of Present Illness





- General


Admit Problem/Dx: 


                           Admission Diagnosis/Problem





Admission Diagnosis/Problem      Acute encephalopathy








  ** Right Foot


Pain Score (Numeric/FACES): 8





Exam





- Vital Signs


Vital Signs: 


                                Last Vital Signs











Temp  36.3 C   20 15:30


 


Pulse  75   20 15:30


 


Resp  16   20 15:30


 


BP  105/58 L  20 15:30


 


Pulse Ox  96   20 15:30














- Patient Data


Result Diagrams: 


                                 20 05:55





                                 20 05:55





Sepsis Event Note





- Focused Exam


Date Exam was Performed: 20


Time Exam was Performed: 15:34


Assessment/Plan Comment:: 


Patient seen and examined by me independently. History and physical was obtained

 and management was discussed with the resident as outlined above.

## 2020-07-27 NOTE — CR
Chest: Portable view of the chest was obtained.

 

Comparison: Prior chest x-ray of 03/17/20.

 

Heart size and mediastinum are within normal limits for portable 

technique.  Lungs show no acute parenchymal change.  Bony structures 

are grossly intact.

 

Impression:

1.  Nothing acute is definitely appreciated on portable chest x-ray.

 

Diagnostic code #1

 

This report was dictated in MDT

## 2020-07-27 NOTE — CT
Head CT

 

Technique: Multiple axial sections through the brain were obtained.  

Intravenous contrast was not utilized.

 

Comparison: No prior intracranial imaging is available.

 

Findings: Ventricles along with basal cisterns and sulci over the 

convexities are mildly prominent.  No abnormal parenchymal densities 

are seen.  No evidence of intracranial hemorrhage.  No midline shift 

or mass-effect is seen.

 

Visualized paranasal sinuses show nothing acute.  No acute calvarial 

abnormality is seen.  Minimal mucosal thickening within the mastoid 

sinus is seen most likely chronic.

 

Impression:

1.  Minimal mastoid sinus findings most likely chronic.

2.  Mild generalized atrophy.

3.  No acute intracranial abnormality is appreciated.

 

Diagnostic code #2

 

This report was dictated in MDT

## 2020-07-27 NOTE — EDM.PDOC
ED HPI GENERAL MEDICAL PROBLEM





- General


Chief Complaint: General


Stated Complaint: POSSIBLE STROKE


Time Seen by Provider: 20 16:29


Source of Information: Reports: Family


History Limitations: Reports: Altered Mental Status





- History of Present Illness


INITIAL COMMENTS - FREE TEXT/NARRATIVE: 


54-year-old female with past medical history of alcohol abuse, tobacco use 

disorder, pulmonary embolism presenting with altered mental status.





Brought in by spouse to the ED by her  with concerns of confusion.  I 

spoke with the  by phone.  He reports that the patient is sleeping much 

more often lately. Concerned about the fact that the patient has had multiple 

falls at home lately.  He is concerned that she is having difficulty walking or 

grasping objects.  She is having significant difficulty standing, walking, and 

taking care of herself. Cannot go to the bathhroom by herself.  He has noted 

slurred speech as well.





Over the past few days, the patient has not been oriented to place or time. 

Thinks she is in [place], [place]. Asking about  relatives and pets. Spouse 

concerned about worsening confusion/disorientation.  Spouse denies any acute 

change in condition today which prompted ED presentation.





Was seen in the emergency department 17 days ago with concern for right foot 

injury.  Patient was placed in a walking boot of the right foot and was referred

to orthopedics but as far as I can tell she has not followed up for this.





Patient unable to participate in HPI due to confusion.





- Related Data


                                    Allergies











Allergy/AdvReac Type Severity Reaction Status Date / Time


 


codeine Allergy  Hives Verified 20 16:25


 


pseudoephedrine Allergy  palpitation Verified 20 16:25





   s  


 


Sulfa (Sulfonamide Allergy  unknown Verified 20 16:25





Antibiotics)     











Home Meds: 


                                    Home Meds





ClonazePAM [KlonoPIN] 2 mg PO TID PRN 18 [History]


Aspirin [Ecotrin EC] 4 tab PO DAILY 10/29/19 [History]


Acetaminophen/HYDROcodone [Norco 325-5 MG] 1 tab PO Q6H #12 tablet 07/10/20 [Rx]


Magnesium 800 mg PO DAILY 07/10/20 [History]


Naproxen [Naprosyn] 500 mg PO Q12HR #20 tab 07/10/20 [Rx]











Past Medical History


Respiratory History: Reports: SOB


OB/GYN History: Reports: Pregnancy


Psychiatric History: Reports: Anxiety, Depression, PTSD





- Infectious Disease History


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 

Enterobacteriaceae, Measles, Mumps





- Past Surgical History


HEENT Surgical History: Reports: Adenoidectomy, Tonsillectomy


GI Surgical History: Reports: Appendectomy, Cholecystectomy, Colostomy, Hernia, 

Abdominal, Other (See Below)


Other GI Surgeries/Procedures: prior colostomy and ileostomy


Female  Surgical History: Reports: Hysterectomy, Salpingo-Oophorectomy


Musculoskeletal Surgical History: Reports: Other (See Below)


Other Musculoskeletal Surgeries/Procedures:: right thumb surgery





Social & Family History





- Family History


Family Medical History: Noncontributory





- Tobacco Use


Smoking Status *Q: Former Smoker


Used Tobacco, but Quit: Yes


Month/Year Tobacco Last Used: 2020





- Caffeine Use


Caffeine Use: Reports: None





- Recreational Drug Use


Recreational Drug Use: No





ED ROS GENERAL





- Review of Systems


Review Of Systems: Unable To Obtain


Reason Not Obtained: Due to altered mental status





ED EXAM, GENERAL





- Physical Exam


Exam: See Below


Free Text/Narrative:: 


Vital signs reviewed.  Nursing notes reviewed.


Constitutional: Awake, alert, non-distressed.


Head: Normocephalic, atraumatic.


Eyes: EOMI, conjunctiva normal, no discharge, no scleral icterus.  Pupils 3 mm 

bilaterally


Ears, Nose, Throat: External ears and nose normal, moist oral mucosa.


Cardiovascular: 2+ radial pulse, capillary refill less than 2 seconds.


Pulmonary: normal work of breathing, no accessory muscle use.  CTA BL


Abdomen/GI: Soft, nontender, nondistended, no guarding or rigidity, no masses.


Musculoskeletal: Swelling and numerous contusions to the right ankle and the 

right foot.


Integumentary: Appropriate color for ethnicity, warm, dry, no pallor or jaund

ice, no rash.  There are 2 areas of ulceration to the right foot which appear 

black, there is no associated drainage or surrounding erythema, they appear 

clean.


Neurologic: Alert, not oriented to place/time/event.  Slurred speech, no facial 

droop, moving all extremities well.


Psychiatric: Inappropriate judgment





EKG INTERPRETATION


EKG Interpretation Comments: 


12-Lead ECG Interpretation 


Acquired: 4:57 PM


Rhythm: Sinus rhythm


Rate: 71 bpm


Axis: Rightward


Intervals: Right bundle branch block


Ectopy: None


Ischemic Changes: None apparent


RV Strain: No obvious RV strain pattern.


ST Segments/T-Waves: No notable changes





Course





- Vital Signs


Text/Narrative:: 


Differential diagnosis includes but is not limited to: CVA, hepatic or 

hypertensive encephalopathy, hypo-/hyperglycemia, hypo-/hyperthermia, opiate 

overdose, alcohol intoxication, electrolyte abnormality, uremia, traumatic 

injury, toxic substances, intracerebral tumor, thyrotoxicosis, infection, 

psychiatric condition, seizure, sepsis, and many others.





On exam patient is markedly encephalopathic and is not able to answer questions 

about place, time, or event.  There is no gross neurologic deficit this point, 

so low suspicion for stroke.  IV access was established and labs were sent.  She

 was placed on nasal cannula oxygen with normalization of her pulse oximetry 

readings.  When she is asleep, her pulse oximetry readings on room air dipped to

 the mid 70s.





CT imaging of the head was unremarkable.  We obtained x-rays of the chest which 

were clear.  Radiologist read the x-rays of the right foot is normal but I am 

concerned about a possible fracture of the proximal right fourth metatarsal.





CBC shows a new microcytic anemia.  INR is normal.  Venous blood gas shows a 

mild respiratory acidosis with a normal venous pH.  Patient is wide awake and 

has normal tidal volume, so I do not feel that respiratory depression due to 

opioids was at play and we will not administer naloxone at this point.





Metabolic panel shows mild hypokalemia, total bilirubin is elevated at 1.6 and 

alkaline phosphatase is 142.  Ammonia is within normal limits.  Troponin is 

negative.  Urinalysis shows positive nitrites but only 2-3 white blood cells, 

small leukocyte esterase.  Few urine bacteria.  We will add on a urine culture 

and administer IV antibiotics.  Toxicology screen was positive for oxycodone, 

alcohol level is negative.  Ordered IV magnesium sulfate along with IV potassium

 replacement.





I spoke with the accepting hospitalist Dr. Daugherty who agrees to admit.  We are 

going to order a CT pulmonary angiogram study to evaluate for her hypoxia given 

recent pulmonary embolism history and spotty anticoagulation adherence.





Signed out to my colleague Dr. Vanessa awaiting results of the CT scan, will be 

admitted afterwards assuming this is negative.


Last Recorded V/S: 


                                Last Vital Signs











Temp  36.6 C   20 04:00


 


Pulse  63   20 04:00


 


Resp  18   20 04:00


 


BP  111/57 L  20 04:00


 


Pulse Ox  94 L  20 04:00














- Orders/Labs/Meds


Orders: 


                               Active Orders 24 hr











 Category Date Time Status


 


 Cardiac Monitoring [RC] .AS DIRECTED Care  20 16:40 Active


 


 Pulse Oximetry [RC] ASDIRECTED Care  20 16:40 Active


 


 CULTURE BLOOD [BC] Stat Lab  20 16:50 Received


 


 CULTURE BLOOD [BC] Stat Lab  20 17:13 Results


 


 CULTURE URINE [RM] Stat Lab  20 18:09 Received


 


 Sodium Chloride 0.9% [Saline Flush] Med  20 16:40 Active





 10 ml FLUSH ASDIRECTED PRN   


 


 Sodium Chloride 0.9% [Saline Flush] Med  20 16:40 Active





 2.5 ml FLUSH ASDIRECTED PRN   


 


 Blood Culture x2 Reflex Set [OM.PC] Stat Oth  20 16:43 Ordered


 


 Saline Lock Insert [OM.PC] Stat Oth  20 16:41 Ordered








                                Medication Orders





Acetaminophen (Tylenol)  650 mg PO Q4H PRN


   PRN Reason: Pain (Mild 1-3)/fever


Enoxaparin Sodium (Lovenox)  40 mg SUBCUT Q24H [facility]


   Last Admin: 20 20:07  Dose: 40 mg


   Documented by: VICKI


Ceftriaxone Sodium/Dextrose 1 (gm/ Premix)  50 mls @ 100 mls/hr IV Q24H [facility]


Lactated Ringer's (Ringers, Lactated)  1,000 mls @ 100 mls/hr IV ASDIRECTED [facility]


   Last Admin: 20 21:44  Dose: 100 mls/hr


   Documented by: BETINA


Lorazepam (Ativan)  1 mg PO Q4H PRN


   PRN Reason: Agitation


Ondansetron HCl (Zofran Odt)  4 mg PO Q4H PRN


   PRN Reason: nausea, able to take PO


Ondansetron HCl (Zofran)  4 mg IVPUSH Q4H PRN


   PRN Reason: Nausea


Sodium Chloride (Saline Flush)  10 ml FLUSH ASDIRECTED PRN


   PRN Reason: Keep Vein Open


   Last Admin: 20 17:56  Dose: 10 ml


   Documented by: [email or web address]


Sodium Chloride (Saline Flush)  2.5 ml FLUSH ASDIRECTED PRN


   PRN Reason: Keep Vein Open


   Last Admin: 20 17:56  Dose: 2.5 ml


   Documented by: VIDYA








Labs: 


                                Laboratory Tests











  20 Range/Units





  16:40 16:40 16:40 


 


WBC    4.65  (4.0-11.0)  K/uL


 


RBC    4.40  (4.30-5.90)  M/uL


 


Hgb    9.9 L  (12.0-16.0)  g/dL


 


Hct    34.5 L  (36.0-46.0)  %


 


MCV    78.4 L  (80.0-98.0)  fL


 


MCH    22.5 L  (27.0-32.0)  pg


 


MCHC    28.7 L  (31.0-37.0)  g/dL


 


RDW Std Deviation    59.3  (28.0-62.0)  fl


 


RDW Coeff of Jason    21 H  (11.0-15.0)  %


 


Plt Count    104 L  (150-400)  K/uL


 


MPV    10.30  (7.40-12.00)  fL


 


Neut % (Auto)    55.3  (48.0-80.0)  %


 


Lymph % (Auto)    36.3  (16.0-40.0)  %


 


Mono % (Auto)    7.3  (0.0-15.0)  %


 


Eos % (Auto)    0.9  (0.0-7.0)  %


 


Baso % (Auto)    0.2  (0.0-1.5)  %


 


Neut # (Auto)    2.6  (1.4-5.7)  K/uL


 


Lymph # (Auto)    1.7  (0.6-2.4)  K/uL


 


Mono # (Auto)    0.3  (0.0-0.8)  K/uL


 


Eos # (Auto)    0.0  (0.0-0.7)  K/uL


 


Baso # (Auto)    0.0  (0.0-0.1)  K/uL


 


Nucleated RBC %    0.0  /100WBC


 


Nucleated RBCs #    0  K/uL


 


INR  1.15    


 


VBG pH     (7.31-7.41)  


 


VBG pCO2     (35-45)  mmHG


 


VBG pO2     (30-40)  mmHG


 


VBG HCO3     (22-30)  mEq/L


 


VBG Total CO2     (41-51)  mmol/L


 


VBG Base Excess     (-3.0-3.0)  


 


Lactate     (0.20-2.00)  mmol/L


 


Sodium   139   (136-145)  mmol/L


 


Potassium   3.1 L   (3.5-5.1)  mmol/L


 


Chloride   102   ()  mmol/L


 


Carbon Dioxide   33.3 H   (21.0-32.0)  mmol/L


 


BUN   7   (7.0-18.0)  mg/dL


 


Creatinine   0.9   (0.6-1.0)  mg/dL


 


Est Cr Clr Drug Dosing   TNP   


 


Estimated GFR (MDRD)   > 60.0   ml/min


 


Glucose   83   ()  mg/dL


 


Calcium   8.2 L   (8.5-10.1)  mg/dL


 


Total Bilirubin   1.6 H   (0.2-1.0)  mg/dL


 


AST   28   (15-37)  IU/L


 


ALT   36   (14-63)  IU/L


 


Alkaline Phosphatase   142 H   ()  U/L


 


Ammonia     (19-54)  ug/dL


 


Troponin I   < 0.050   (0.000-0.056)  ng/mL


 


Total Protein   6.7   (6.4-8.2)  g/dL


 


Albumin   2.8 L   (3.4-5.0)  g/dL


 


Globulin   3.9   (2.6-4.0)  g/dL


 


Albumin/Globulin Ratio   0.7 L   (0.9-1.6)  


 


Urine Color     


 


Urine Appearance     


 


Urine pH     (5.0-8.0)  


 


Ur Specific Gravity     (1.001-1.035)  


 


Urine Protein     (NEGATIVE)  mg/dL


 


Urine Glucose (UA)     (NEGATIVE)  mg/dL


 


Urine Ketones     (NEGATIVE)  mg/dL


 


Urine Occult Blood     (NEGATIVE)  


 


Urine Nitrite     (NEGATIVE)  


 


Urine Bilirubin     (NEGATIVE)  


 


Urine Ictotest     


 


Urine Urobilinogen     (<2.0)  EU/dL


 


Ur Leukocyte Esterase     (NEGATIVE)  


 


Urine RBC     (0-2/HPF)  


 


Urine WBC     (0-5/HPF)  


 


Ur Epithelial Cells     (NONE-FEW)  


 


Urine Bacteria     (NEGATIVE)  


 


Urine Opiates Screen     (NEGATIVE)  


 


Ur Oxycodone Screen     (NEGATIVE)  


 


Urine Methadone Screen     (NEGATIVE)  


 


Ur Barbiturates Screen     (NEGATIVE)  


 


Ur Phencyclidine Scrn     (NEGATIVE)  


 


Ur Amphetamine Screen     (NEGATIVE)  


 


U Methamphetamines Scrn     (NEGATIVE)  


 


U Benzodiazepines Scrn     (NEGATIVE)  


 


U Cocaine Metab Screen     (NEGATIVE)  


 


U Marijuana (THC) Screen     (NEGATIVE)  


 


Ethyl Alcohol   < 3.0   mg/dL














  20 Range/Units





  17:13 17:13 17:13 


 


WBC     (4.0-11.0)  K/uL


 


RBC     (4.30-5.90)  M/uL


 


Hgb     (12.0-16.0)  g/dL


 


Hct     (36.0-46.0)  %


 


MCV     (80.0-98.0)  fL


 


MCH     (27.0-32.0)  pg


 


MCHC     (31.0-37.0)  g/dL


 


RDW Std Deviation     (28.0-62.0)  fl


 


RDW Coeff of Jason     (11.0-15.0)  %


 


Plt Count     (150-400)  K/uL


 


MPV     (7.40-12.00)  fL


 


Neut % (Auto)     (48.0-80.0)  %


 


Lymph % (Auto)     (16.0-40.0)  %


 


Mono % (Auto)     (0.0-15.0)  %


 


Eos % (Auto)     (0.0-7.0)  %


 


Baso % (Auto)     (0.0-1.5)  %


 


Neut # (Auto)     (1.4-5.7)  K/uL


 


Lymph # (Auto)     (0.6-2.4)  K/uL


 


Mono # (Auto)     (0.0-0.8)  K/uL


 


Eos # (Auto)     (0.0-0.7)  K/uL


 


Baso # (Auto)     (0.0-0.1)  K/uL


 


Nucleated RBC %     /100WBC


 


Nucleated RBCs #     K/uL


 


INR     


 


VBG pH    7.38  (7.31-7.41)  


 


VBG pCO2    53 H  (35-45)  mmHG


 


VBG pO2    29 L  (30-40)  mmHG


 


VBG HCO3    32 H  (22-30)  mEq/L


 


VBG Total CO2    30 L  (41-51)  mmol/L


 


VBG Base Excess    5.7 H  (-3.0-3.0)  


 


Lactate  1.2    (0.20-2.00)  mmol/L


 


Sodium     (136-145)  mmol/L


 


Potassium     (3.5-5.1)  mmol/L


 


Chloride     ()  mmol/L


 


Carbon Dioxide     (21.0-32.0)  mmol/L


 


BUN     (7.0-18.0)  mg/dL


 


Creatinine     (0.6-1.0)  mg/dL


 


Est Cr Clr Drug Dosing     


 


Estimated GFR (MDRD)     ml/min


 


Glucose     ()  mg/dL


 


Calcium     (8.5-10.1)  mg/dL


 


Total Bilirubin     (0.2-1.0)  mg/dL


 


AST     (15-37)  IU/L


 


ALT     (14-63)  IU/L


 


Alkaline Phosphatase     ()  U/L


 


Ammonia   27   (19-54)  ug/dL


 


Troponin I     (0.000-0.056)  ng/mL


 


Total Protein     (6.4-8.2)  g/dL


 


Albumin     (3.4-5.0)  g/dL


 


Globulin     (2.6-4.0)  g/dL


 


Albumin/Globulin Ratio     (0.9-1.6)  


 


Urine Color     


 


Urine Appearance     


 


Urine pH     (5.0-8.0)  


 


Ur Specific Gravity     (1.001-1.035)  


 


Urine Protein     (NEGATIVE)  mg/dL


 


Urine Glucose (UA)     (NEGATIVE)  mg/dL


 


Urine Ketones     (NEGATIVE)  mg/dL


 


Urine Occult Blood     (NEGATIVE)  


 


Urine Nitrite     (NEGATIVE)  


 


Urine Bilirubin     (NEGATIVE)  


 


Urine Ictotest     


 


Urine Urobilinogen     (<2.0)  EU/dL


 


Ur Leukocyte Esterase     (NEGATIVE)  


 


Urine RBC     (0-2/HPF)  


 


Urine WBC     (0-5/HPF)  


 


Ur Epithelial Cells     (NONE-FEW)  


 


Urine Bacteria     (NEGATIVE)  


 


Urine Opiates Screen     (NEGATIVE)  


 


Ur Oxycodone Screen     (NEGATIVE)  


 


Urine Methadone Screen     (NEGATIVE)  


 


Ur Barbiturates Screen     (NEGATIVE)  


 


Ur Phencyclidine Scrn     (NEGATIVE)  


 


Ur Amphetamine Screen     (NEGATIVE)  


 


U Methamphetamines Scrn     (NEGATIVE)  


 


U Benzodiazepines Scrn     (NEGATIVE)  


 


U Cocaine Metab Screen     (NEGATIVE)  


 


U Marijuana (THC) Screen     (NEGATIVE)  


 


Ethyl Alcohol     mg/dL














  20 Range/Units





  18:09 18:09 


 


WBC    (4.0-11.0)  K/uL


 


RBC    (4.30-5.90)  M/uL


 


Hgb    (12.0-16.0)  g/dL


 


Hct    (36.0-46.0)  %


 


MCV    (80.0-98.0)  fL


 


MCH    (27.0-32.0)  pg


 


MCHC    (31.0-37.0)  g/dL


 


RDW Std Deviation    (28.0-62.0)  fl


 


RDW Coeff of Jason    (11.0-15.0)  %


 


Plt Count    (150-400)  K/uL


 


MPV    (7.40-12.00)  fL


 


Neut % (Auto)    (48.0-80.0)  %


 


Lymph % (Auto)    (16.0-40.0)  %


 


Mono % (Auto)    (0.0-15.0)  %


 


Eos % (Auto)    (0.0-7.0)  %


 


Baso % (Auto)    (0.0-1.5)  %


 


Neut # (Auto)    (1.4-5.7)  K/uL


 


Lymph # (Auto)    (0.6-2.4)  K/uL


 


Mono # (Auto)    (0.0-0.8)  K/uL


 


Eos # (Auto)    (0.0-0.7)  K/uL


 


Baso # (Auto)    (0.0-0.1)  K/uL


 


Nucleated RBC %    /100WBC


 


Nucleated RBCs #    K/uL


 


INR    


 


VBG pH    (7.31-7.41)  


 


VBG pCO2    (35-45)  mmHG


 


VBG pO2    (30-40)  mmHG


 


VBG HCO3    (22-30)  mEq/L


 


VBG Total CO2    (41-51)  mmol/L


 


VBG Base Excess    (-3.0-3.0)  


 


Lactate    (0.20-2.00)  mmol/L


 


Sodium    (136-145)  mmol/L


 


Potassium    (3.5-5.1)  mmol/L


 


Chloride    ()  mmol/L


 


Carbon Dioxide    (21.0-32.0)  mmol/L


 


BUN    (7.0-18.0)  mg/dL


 


Creatinine    (0.6-1.0)  mg/dL


 


Est Cr Clr Drug Dosing    


 


Estimated GFR (MDRD)    ml/min


 


Glucose    ()  mg/dL


 


Calcium    (8.5-10.1)  mg/dL


 


Total Bilirubin    (0.2-1.0)  mg/dL


 


AST    (15-37)  IU/L


 


ALT    (14-63)  IU/L


 


Alkaline Phosphatase    ()  U/L


 


Ammonia    (19-54)  ug/dL


 


Troponin I    (0.000-0.056)  ng/mL


 


Total Protein    (6.4-8.2)  g/dL


 


Albumin    (3.4-5.0)  g/dL


 


Globulin    (2.6-4.0)  g/dL


 


Albumin/Globulin Ratio    (0.9-1.6)  


 


Urine Color  YELLOW   


 


Urine Appearance  SLT CLOUDY   


 


Urine pH  7.5   (5.0-8.0)  


 


Ur Specific Gravity  1.020   (1.001-1.035)  


 


Urine Protein  TRACE H   (NEGATIVE)  mg/dL


 


Urine Glucose (UA)  NEGATIVE   (NEGATIVE)  mg/dL


 


Urine Ketones  TRACE H   (NEGATIVE)  mg/dL


 


Urine Occult Blood  NEGATIVE   (NEGATIVE)  


 


Urine Nitrite  POSITIVE H   (NEGATIVE)  


 


Urine Bilirubin  SMALL H   (NEGATIVE)  


 


Urine Ictotest  NEGATIVE   


 


Urine Urobilinogen  >=8.0 H   (<2.0)  EU/dL


 


Ur Leukocyte Esterase  SMALL H   (NEGATIVE)  


 


Urine RBC  0-1   (0-2/HPF)  


 


Urine WBC  2-3   (0-5/HPF)  


 


Ur Epithelial Cells  OCCASIONAL   (NONE-FEW)  


 


Urine Bacteria  FEW   (NEGATIVE)  


 


Urine Opiates Screen   NEGATIVE  (NEGATIVE)  


 


Ur Oxycodone Screen   POSITIVE  (NEGATIVE)  


 


Urine Methadone Screen   NEGATIVE  (NEGATIVE)  


 


Ur Barbiturates Screen   NEGATIVE  (NEGATIVE)  


 


Ur Phencyclidine Scrn   NEGATIVE  (NEGATIVE)  


 


Ur Amphetamine Screen   NEGATIVE  (NEGATIVE)  


 


U Methamphetamines Scrn   NEGATIVE  (NEGATIVE)  


 


U Benzodiazepines Scrn   NEGATIVE  (NEGATIVE)  


 


U Cocaine Metab Screen   NEGATIVE  (NEGATIVE)  


 


U Marijuana (THC) Screen   NEGATIVE  (NEGATIVE)  


 


Ethyl Alcohol    mg/dL











Meds: 


Medications











Generic Name Dose Route Start Last Admin





  Trade Name Freq  PRN Reason Stop Dose Admin


 


Acetaminophen  650 mg  20 19:35 





  Tylenol  PO  





  Q4H PRN  





  Pain (Mild 1-3)/fever  


 


Enoxaparin Sodium  40 mg  20 20:00  20 20:07





  Lovenox  SUBCUT   40 mg





  Q24H [place]   Administration


 


Ceftriaxone Sodium/Dextrose 1  50 mls @ 100 mls/hr  20 18:00 





  gm/ Premix  IV  





  Q24H [place]  


 


Lactated Ringer's  1,000 mls @ 100 mls/hr  20 20:15  20 21:44





  Ringers, Lactated  IV   100 mls/hr





  ASDIRECTED [place]   Administration


 


Lorazepam  1 mg  20 19:51 





  Ativan  PO  





  Q4H PRN  





  Agitation  


 


Ondansetron HCl  4 mg  20 19:35 





  Zofran Odt  PO  





  Q4H PRN  





  nausea, able to take PO  


 


Ondansetron HCl  4 mg  20 19:35 





  Zofran  IVPUSH  





  Q4H PRN  





  Nausea  


 


Sodium Chloride  10 ml  20 16:40  20 17:56





  Saline Flush  FLUSH   10 ml





  ASDIRECTED PRN   Administration





  Keep Vein Open  


 


Sodium Chloride  2.5 ml  20 16:40  20 17:56





  Saline Flush  FLUSH   2.5 ml





  ASDIRECTED PRN   Administration





  Keep Vein Open  














Discontinued Medications














Generic Name Dose Route Start Last Admin





  Trade Name Freq  PRN Reason Stop Dose Admin


 


Ceftriaxone Sodium/Dextrose 1  50 mls @ 100 mls/hr  20 18:40  20 

18:52





  gm/ Premix  IV  20 19:09  100 mls/hr





  ONETIME ONE   Administration


 


Magnesium Sulfate 2 gm/ Premix  50 mls @ 50 mls/hr  20 18:41  20 

18:53





  IV  20 19:40  50 mls/hr





  ONETIME ONE   Administration


 


Potassium Chloride 20 meq/  50 mls @ 25 mls/hr  20 18:58  20 20:00





  Premix  IV  20 20:57  25 mls/hr





  ONETIME ONE   Administration


 


Lactated Ringer's  1,000 mls @ 100 mls/hr  20 20:00 





  Ringers, Lactated  IV  20 05:59 





  ASDIRECTED [place]  


 


Iopamidol  60 ml  20 20:42  20 20:43





  Isovue Multipack-370 (76%)  IVPUSH  20 20:43  60 ml





  ONETIME STA   Administration














Departure





- Departure


Time of Disposition: 19:41


Disposition: Refer to Observation


Condition: Fair


Clinical Impression: 


 Acute encephalopathy








- Discharge Information





Sepsis Event Note (ED)





- Evaluation


Sepsis Screening Result: No Definite Risk





- My Orders


Last 24 Hours: 


My Active Orders





20 16:40


Cardiac Monitoring [RC] .AS DIRECTED 


Pulse Oximetry [RC] ASDIRECTED 


Sodium Chloride 0.9% [Saline Flush]   10 ml FLUSH ASDIRECTED PRN 


Sodium Chloride 0.9% [Saline Flush]   2.5 ml FLUSH ASDIRECTED PRN 





20 16:41


Saline Lock Insert [OM.PC] Stat 





20 16:43


Blood Culture x2 Reflex Set [OM.PC] Stat 





20 16:50


CULTURE BLOOD [BC] Stat 





20 17:13


CULTURE BLOOD [BC] Stat 





20 18:09


CULTURE URINE [RM] Stat 














- Assessment/Plan


Last 24 Hours: 


My Active Orders





20 16:40


Cardiac Monitoring [RC] .AS DIRECTED 


Pulse Oximetry [RC] ASDIRECTED 


Sodium Chloride 0.9% [Saline Flush]   10 ml FLUSH ASDIRECTED PRN 


Sodium Chloride 0.9% [Saline Flush]   2.5 ml FLUSH ASDIRECTED PRN 





20 16:41


Saline Lock Insert [OM.PC] Stat 





20 16:43


Blood Culture x2 Reflex Set [OM.PC] Stat 





20 16:50


CULTURE BLOOD [BC] Stat 





20 17:13


CULTURE BLOOD [BC] Stat 





20 18:09


CULTURE URINE [RM] Stat

## 2020-07-27 NOTE — CT
INDICATION:



Hypoxia, prior PE



COMPARISON:



CTA chest 01/10/2020



TECHNIQUE:



Contrast enhanced axial CT imaging through the chest, optimized for 

assessment of the pulmonary arterial tree. 60 mL Isovue 370 contrast agent 

was administered intravenously. Sagittal and coronal reconstructions are 

provided. 



FINDINGS:



There is adequate opacification of the pulmonary arterial. There is no 

acute pulmonary embolism. A few thin septations within the medial and 

posterior basal segmental branches of the right lower lobe pulmonary artery 

are compatible with residual chronic thrombus. The main pulmonary artery is 

mildly dilated, similar to prior, suggesting chronic pulmonary 

hypertension. 



There is mild cardiomegaly. There is a small pericardial effusion. There is 

normal caliber of the thoracic aorta. Mild mediastinal and hilar 

lymphadenopathy are similar to prior. Consolidative changes in the right 

costovertebral angle are improved. There is mild bibasilar atelectasis. 

There is also mild interstitial edema. There is no pleural effusion or 

pneumothorax.



The thoracic osseous structures are unremarkable. In the upper abdomen, 

ascites is partially visualized.



IMPRESSION:



1. No acute pulmonary embolism. Minimal residual chronic thrombus in the 

right lower lobe. 



2. Mild cardiomegaly and small pericardial effusion. Mild interstitial 

edema. Correlate for congestive heart failure. 



3. Improving consolidative changes in the right costovertebral angle. 



4. Stable nonspecific mediastinal and hilar lymphadenopathy. 



5. Partially visualized mild ascites.



Please note that all CT scans at this facility use dose modulation, 

iterative reconstruction, and/or weight-based dosing when appropriate to 

reduce radiation dose to as low as reasonably achievable.



Dictated by Krissy Martinez MD @ Jul 27 2020  9:03PM



Signed by Dr. rKissy Martinez @ Jul 27 2020  9:21PM

## 2020-07-27 NOTE — CR
Right foot: 3 views of the right foot were obtained.

 

Comparison: Prior right foot exam of 07/10/20.

 

Findings: Diffuse soft tissue swelling is noted.  No discrete fracture

 or other bony abnormality is appreciated.

 

Impression:

1.  Soft tissue swelling.

2.  No definite acute bony abnormality is appreciated.

 

Note: If patient's symptoms are persistent, MRI could be considered to

 further evaluate.

 

Diagnostic code #2

 

This report was dictated in MDT

## 2020-07-28 NOTE — PCM.PN
<Sandro Shore - Last Filed: 07/28/20 16:51>





- General Info


Date of Service: 07/28/20


Subjective Update: 





Patient reports feeling better this morning and appears to be more alert. No 

complaints at bedside this morning.





- Patient Data


Vitals - Most Recent: 


                                Last Vital Signs











Temp  37.1 C   07/28/20 16:11


 


Pulse  86   07/28/20 16:11


 


Resp  28 H  07/28/20 16:11


 


BP  109/58 L  07/28/20 16:11


 


Pulse Ox  85 L  07/28/20 16:11











Weight - Most Recent: 91.49 kg


I&O - Last 24 Hours: 


                                 Intake & Output











 07/28/20 07/28/20 07/28/20





 06:59 14:59 22:59


 


Intake Total 868  


 


Output Total 400  


 


Balance 468  











Lab Results Last 24 Hours: 


                         Laboratory Results - last 24 hr











  07/27/20 07/27/20 07/27/20 Range/Units





  16:40 16:40 16:40 


 


WBC    4.65  (4.0-11.0)  K/uL


 


RBC    4.40  (4.30-5.90)  M/uL


 


Hgb    9.9 L  (12.0-16.0)  g/dL


 


Hct    34.5 L  (36.0-46.0)  %


 


MCV    78.4 L  (80.0-98.0)  fL


 


MCH    22.5 L  (27.0-32.0)  pg


 


MCHC    28.7 L  (31.0-37.0)  g/dL


 


RDW Std Deviation    59.3  (28.0-62.0)  fl


 


RDW Coeff of Jason    21 H  (11.0-15.0)  %


 


Plt Count    104 L  (150-400)  K/uL


 


MPV    10.30  (7.40-12.00)  fL


 


Neut % (Auto)    55.3  (48.0-80.0)  %


 


Lymph % (Auto)    36.3  (16.0-40.0)  %


 


Mono % (Auto)    7.3  (0.0-15.0)  %


 


Eos % (Auto)    0.9  (0.0-7.0)  %


 


Baso % (Auto)    0.2  (0.0-1.5)  %


 


Neut # (Auto)    2.6  (1.4-5.7)  K/uL


 


Lymph # (Auto)    1.7  (0.6-2.4)  K/uL


 


Mono # (Auto)    0.3  (0.0-0.8)  K/uL


 


Eos # (Auto)    0.0  (0.0-0.7)  K/uL


 


Baso # (Auto)    0.0  (0.0-0.1)  K/uL


 


Nucleated RBC %    0.0  /100WBC


 


Nucleated RBCs #    0  K/uL


 


INR  1.15    


 


VBG pH     (7.31-7.41)  


 


VBG pCO2     (35-45)  mmHG


 


VBG pO2     (30-40)  mmHG


 


VBG HCO3     (22-30)  mEq/L


 


VBG Total CO2     (41-51)  mmol/L


 


VBG Base Excess     (-3.0-3.0)  


 


Lactate     (0.20-2.00)  mmol/L


 


Sodium   139   (136-145)  mmol/L


 


Potassium   3.1 L   (3.5-5.1)  mmol/L


 


Chloride   102   ()  mmol/L


 


Carbon Dioxide   33.3 H   (21.0-32.0)  mmol/L


 


BUN   7   (7.0-18.0)  mg/dL


 


Creatinine   0.9   (0.6-1.0)  mg/dL


 


Est Cr Clr Drug Dosing   TNP   


 


Estimated GFR (MDRD)   > 60.0   ml/min


 


Glucose   83   ()  mg/dL


 


POC Glucose     ()  mg/dL


 


Calcium   8.2 L   (8.5-10.1)  mg/dL


 


Phosphorus     (2.6-4.7)  mg/dL


 


Magnesium     (1.8-2.4)  mg/dL


 


Iron     ()  ug/dL


 


TIBC     (250-450)  ug/dL


 


% Saturation     (20-55)  %


 


Ferritin     (8-252)  ng/mL


 


Total Bilirubin   1.6 H   (0.2-1.0)  mg/dL


 


AST   28   (15-37)  IU/L


 


ALT   36   (14-63)  IU/L


 


Alkaline Phosphatase   142 H   ()  U/L


 


Ammonia     (19-54)  ug/dL


 


Troponin I   < 0.050   (0.000-0.056)  ng/mL


 


Total Protein   6.7   (6.4-8.2)  g/dL


 


Albumin   2.8 L   (3.4-5.0)  g/dL


 


Globulin   3.9   (2.6-4.0)  g/dL


 


Albumin/Globulin Ratio   0.7 L   (0.9-1.6)  


 


Vitamin B12     (193-986)  pg/mL


 


Urine Color     


 


Urine Appearance     


 


Urine pH     (5.0-8.0)  


 


Ur Specific Gravity     (1.001-1.035)  


 


Urine Protein     (NEGATIVE)  mg/dL


 


Urine Glucose (UA)     (NEGATIVE)  mg/dL


 


Urine Ketones     (NEGATIVE)  mg/dL


 


Urine Occult Blood     (NEGATIVE)  


 


Urine Nitrite     (NEGATIVE)  


 


Urine Bilirubin     (NEGATIVE)  


 


Urine Ictotest     


 


Urine Urobilinogen     (<2.0)  EU/dL


 


Ur Leukocyte Esterase     (NEGATIVE)  


 


Urine RBC     (0-2/HPF)  


 


Urine WBC     (0-5/HPF)  


 


Ur Epithelial Cells     (NONE-FEW)  


 


Urine Bacteria     (NEGATIVE)  


 


Urine Opiates Screen     (NEGATIVE)  


 


Ur Oxycodone Screen     (NEGATIVE)  


 


Urine Methadone Screen     (NEGATIVE)  


 


Ur Barbiturates Screen     (NEGATIVE)  


 


Ur Phencyclidine Scrn     (NEGATIVE)  


 


Ur Amphetamine Screen     (NEGATIVE)  


 


U Methamphetamines Scrn     (NEGATIVE)  


 


U Benzodiazepines Scrn     (NEGATIVE)  


 


U Cocaine Metab Screen     (NEGATIVE)  


 


U Marijuana (THC) Screen     (NEGATIVE)  


 


Ethyl Alcohol   < 3.0   mg/dL


 


COVID-19 ([facility]IGO)     (NEGATIVE)  














  07/27/20 07/27/20 07/27/20 Range/Units





  17:13 17:13 17:13 


 


WBC     (4.0-11.0)  K/uL


 


RBC     (4.30-5.90)  M/uL


 


Hgb     (12.0-16.0)  g/dL


 


Hct     (36.0-46.0)  %


 


MCV     (80.0-98.0)  fL


 


MCH     (27.0-32.0)  pg


 


MCHC     (31.0-37.0)  g/dL


 


RDW Std Deviation     (28.0-62.0)  fl


 


RDW Coeff of Jason     (11.0-15.0)  %


 


Plt Count     (150-400)  K/uL


 


MPV     (7.40-12.00)  fL


 


Neut % (Auto)     (48.0-80.0)  %


 


Lymph % (Auto)     (16.0-40.0)  %


 


Mono % (Auto)     (0.0-15.0)  %


 


Eos % (Auto)     (0.0-7.0)  %


 


Baso % (Auto)     (0.0-1.5)  %


 


Neut # (Auto)     (1.4-5.7)  K/uL


 


Lymph # (Auto)     (0.6-2.4)  K/uL


 


Mono # (Auto)     (0.0-0.8)  K/uL


 


Eos # (Auto)     (0.0-0.7)  K/uL


 


Baso # (Auto)     (0.0-0.1)  K/uL


 


Nucleated RBC %     /100WBC


 


Nucleated RBCs #     K/uL


 


INR     


 


VBG pH    7.38  (7.31-7.41)  


 


VBG pCO2    53 H  (35-45)  mmHG


 


VBG pO2    29 L  (30-40)  mmHG


 


VBG HCO3    32 H  (22-30)  mEq/L


 


VBG Total CO2    30 L  (41-51)  mmol/L


 


VBG Base Excess    5.7 H  (-3.0-3.0)  


 


Lactate  1.2    (0.20-2.00)  mmol/L


 


Sodium     (136-145)  mmol/L


 


Potassium     (3.5-5.1)  mmol/L


 


Chloride     ()  mmol/L


 


Carbon Dioxide     (21.0-32.0)  mmol/L


 


BUN     (7.0-18.0)  mg/dL


 


Creatinine     (0.6-1.0)  mg/dL


 


Est Cr Clr Drug Dosing     


 


Estimated GFR (MDRD)     ml/min


 


Glucose     ()  mg/dL


 


POC Glucose     ()  mg/dL


 


Calcium     (8.5-10.1)  mg/dL


 


Phosphorus     (2.6-4.7)  mg/dL


 


Magnesium     (1.8-2.4)  mg/dL


 


Iron     ()  ug/dL


 


TIBC     (250-450)  ug/dL


 


% Saturation     (20-55)  %


 


Ferritin     (8-252)  ng/mL


 


Total Bilirubin     (0.2-1.0)  mg/dL


 


AST     (15-37)  IU/L


 


ALT     (14-63)  IU/L


 


Alkaline Phosphatase     ()  U/L


 


Ammonia   27   (19-54)  ug/dL


 


Troponin I     (0.000-0.056)  ng/mL


 


Total Protein     (6.4-8.2)  g/dL


 


Albumin     (3.4-5.0)  g/dL


 


Globulin     (2.6-4.0)  g/dL


 


Albumin/Globulin Ratio     (0.9-1.6)  


 


Vitamin B12     (193-986)  pg/mL


 


Urine Color     


 


Urine Appearance     


 


Urine pH     (5.0-8.0)  


 


Ur Specific Gravity     (1.001-1.035)  


 


Urine Protein     (NEGATIVE)  mg/dL


 


Urine Glucose (UA)     (NEGATIVE)  mg/dL


 


Urine Ketones     (NEGATIVE)  mg/dL


 


Urine Occult Blood     (NEGATIVE)  


 


Urine Nitrite     (NEGATIVE)  


 


Urine Bilirubin     (NEGATIVE)  


 


Urine Ictotest     


 


Urine Urobilinogen     (<2.0)  EU/dL


 


Ur Leukocyte Esterase     (NEGATIVE)  


 


Urine RBC     (0-2/HPF)  


 


Urine WBC     (0-5/HPF)  


 


Ur Epithelial Cells     (NONE-FEW)  


 


Urine Bacteria     (NEGATIVE)  


 


Urine Opiates Screen     (NEGATIVE)  


 


Ur Oxycodone Screen     (NEGATIVE)  


 


Urine Methadone Screen     (NEGATIVE)  


 


Ur Barbiturates Screen     (NEGATIVE)  


 


Ur Phencyclidine Scrn     (NEGATIVE)  


 


Ur Amphetamine Screen     (NEGATIVE)  


 


U Methamphetamines Scrn     (NEGATIVE)  


 


U Benzodiazepines Scrn     (NEGATIVE)  


 


U Cocaine Metab Screen     (NEGATIVE)  


 


U Marijuana (THC) Screen     (NEGATIVE)  


 


Ethyl Alcohol     mg/dL


 


COVID-19 ([facility]IGO)     (NEGATIVE)  














  07/27/20 07/27/20 07/27/20 Range/Units





  18:09 18:09 19:07 


 


WBC     (4.0-11.0)  K/uL


 


RBC     (4.30-5.90)  M/uL


 


Hgb     (12.0-16.0)  g/dL


 


Hct     (36.0-46.0)  %


 


MCV     (80.0-98.0)  fL


 


MCH     (27.0-32.0)  pg


 


MCHC     (31.0-37.0)  g/dL


 


RDW Std Deviation     (28.0-62.0)  fl


 


RDW Coeff of Jason     (11.0-15.0)  %


 


Plt Count     (150-400)  K/uL


 


MPV     (7.40-12.00)  fL


 


Neut % (Auto)     (48.0-80.0)  %


 


Lymph % (Auto)     (16.0-40.0)  %


 


Mono % (Auto)     (0.0-15.0)  %


 


Eos % (Auto)     (0.0-7.0)  %


 


Baso % (Auto)     (0.0-1.5)  %


 


Neut # (Auto)     (1.4-5.7)  K/uL


 


Lymph # (Auto)     (0.6-2.4)  K/uL


 


Mono # (Auto)     (0.0-0.8)  K/uL


 


Eos # (Auto)     (0.0-0.7)  K/uL


 


Baso # (Auto)     (0.0-0.1)  K/uL


 


Nucleated RBC %     /100WBC


 


Nucleated RBCs #     K/uL


 


INR     


 


VBG pH     (7.31-7.41)  


 


VBG pCO2     (35-45)  mmHG


 


VBG pO2     (30-40)  mmHG


 


VBG HCO3     (22-30)  mEq/L


 


VBG Total CO2     (41-51)  mmol/L


 


VBG Base Excess     (-3.0-3.0)  


 


Lactate     (0.20-2.00)  mmol/L


 


Sodium     (136-145)  mmol/L


 


Potassium     (3.5-5.1)  mmol/L


 


Chloride     ()  mmol/L


 


Carbon Dioxide     (21.0-32.0)  mmol/L


 


BUN     (7.0-18.0)  mg/dL


 


Creatinine     (0.6-1.0)  mg/dL


 


Est Cr Clr Drug Dosing     


 


Estimated GFR (MDRD)     ml/min


 


Glucose     ()  mg/dL


 


POC Glucose     ()  mg/dL


 


Calcium     (8.5-10.1)  mg/dL


 


Phosphorus     (2.6-4.7)  mg/dL


 


Magnesium     (1.8-2.4)  mg/dL


 


Iron     ()  ug/dL


 


TIBC     (250-450)  ug/dL


 


% Saturation     (20-55)  %


 


Ferritin     (8-252)  ng/mL


 


Total Bilirubin     (0.2-1.0)  mg/dL


 


AST     (15-37)  IU/L


 


ALT     (14-63)  IU/L


 


Alkaline Phosphatase     ()  U/L


 


Ammonia     (19-54)  ug/dL


 


Troponin I     (0.000-0.056)  ng/mL


 


Total Protein     (6.4-8.2)  g/dL


 


Albumin     (3.4-5.0)  g/dL


 


Globulin     (2.6-4.0)  g/dL


 


Albumin/Globulin Ratio     (0.9-1.6)  


 


Vitamin B12     (193-986)  pg/mL


 


Urine Color  YELLOW    


 


Urine Appearance  SLT CLOUDY    


 


Urine pH  7.5    (5.0-8.0)  


 


Ur Specific Gravity  1.020    (1.001-1.035)  


 


Urine Protein  TRACE H    (NEGATIVE)  mg/dL


 


Urine Glucose (UA)  NEGATIVE    (NEGATIVE)  mg/dL


 


Urine Ketones  TRACE H    (NEGATIVE)  mg/dL


 


Urine Occult Blood  NEGATIVE    (NEGATIVE)  


 


Urine Nitrite  POSITIVE H    (NEGATIVE)  


 


Urine Bilirubin  SMALL H    (NEGATIVE)  


 


Urine Ictotest  NEGATIVE    


 


Urine Urobilinogen  >=8.0 H    (<2.0)  EU/dL


 


Ur Leukocyte Esterase  SMALL H    (NEGATIVE)  


 


Urine RBC  0-1    (0-2/HPF)  


 


Urine WBC  2-3    (0-5/HPF)  


 


Ur Epithelial Cells  OCCASIONAL    (NONE-FEW)  


 


Urine Bacteria  FEW    (NEGATIVE)  


 


Urine Opiates Screen   NEGATIVE   (NEGATIVE)  


 


Ur Oxycodone Screen   POSITIVE   (NEGATIVE)  


 


Urine Methadone Screen   NEGATIVE   (NEGATIVE)  


 


Ur Barbiturates Screen   NEGATIVE   (NEGATIVE)  


 


Ur Phencyclidine Scrn   NEGATIVE   (NEGATIVE)  


 


Ur Amphetamine Screen   NEGATIVE   (NEGATIVE)  


 


U Methamphetamines Scrn   NEGATIVE   (NEGATIVE)  


 


U Benzodiazepines Scrn   NEGATIVE   (NEGATIVE)  


 


U Cocaine Metab Screen   NEGATIVE   (NEGATIVE)  


 


U Marijuana (THC) Screen   NEGATIVE   (NEGATIVE)  


 


Ethyl Alcohol     mg/dL


 


COVID-19 ([facility]IG[facility])    NEGATIVE  (NEGATIVE)  














  07/27/20 07/28/20 07/28/20 Range/Units





  19:26 05:28 05:28 


 


WBC   2.83 L   (4.0-11.0)  K/uL


 


RBC   3.75 L   (4.30-5.90)  M/uL


 


Hgb   8.3 L   (12.0-16.0)  g/dL


 


Hct   29.9 L   (36.0-46.0)  %


 


MCV   79.7 L   (80.0-98.0)  fL


 


MCH   22.1 L   (27.0-32.0)  pg


 


MCHC   27.8 L   (31.0-37.0)  g/dL


 


RDW Std Deviation   60.7   (28.0-62.0)  fl


 


RDW Coeff of Jason   21 H   (11.0-15.0)  %


 


Plt Count   90 L   (150-400)  K/uL


 


MPV   9.70   (7.40-12.00)  fL


 


Neut % (Auto)   58.6   (48.0-80.0)  %


 


Lymph % (Auto)   31.8   (16.0-40.0)  %


 


Mono % (Auto)   8.5   (0.0-15.0)  %


 


Eos % (Auto)   1.1   (0.0-7.0)  %


 


Baso % (Auto)   0.0   (0.0-1.5)  %


 


Neut # (Auto)   1.7   (1.4-5.7)  K/uL


 


Lymph # (Auto)   0.9   (0.6-2.4)  K/uL


 


Mono # (Auto)   0.2   (0.0-0.8)  K/uL


 


Eos # (Auto)   0.0   (0.0-0.7)  K/uL


 


Baso # (Auto)   0.0   (0.0-0.1)  K/uL


 


Nucleated RBC %   0.0   /100WBC


 


Nucleated RBCs #   0   K/uL


 


INR     


 


VBG pH     (7.31-7.41)  


 


VBG pCO2     (35-45)  mmHG


 


VBG pO2     (30-40)  mmHG


 


VBG HCO3     (22-30)  mEq/L


 


VBG Total CO2     (41-51)  mmol/L


 


VBG Base Excess     (-3.0-3.0)  


 


Lactate     (0.20-2.00)  mmol/L


 


Sodium    142  (136-145)  mmol/L


 


Potassium    3.3 L  (3.5-5.1)  mmol/L


 


Chloride    103  ()  mmol/L


 


Carbon Dioxide    33.3 H  (21.0-32.0)  mmol/L


 


BUN    5 L  (7.0-18.0)  mg/dL


 


Creatinine    0.8  (0.6-1.0)  mg/dL


 


Est Cr Clr Drug Dosing    76.36  


 


Estimated GFR (MDRD)    > 60.0  ml/min


 


Glucose    70 L  ()  mg/dL


 


POC Glucose     ()  mg/dL


 


Calcium    7.4 L  (8.5-10.1)  mg/dL


 


Phosphorus    3.9  (2.6-4.7)  mg/dL


 


Magnesium    1.9  (1.8-2.4)  mg/dL


 


Iron  36 L    ()  ug/dL


 


TIBC  485 H    (250-450)  ug/dL


 


% Saturation  7.42 L    (20-55)  %


 


Ferritin  34    (8-252)  ng/mL


 


Total Bilirubin    1.0  (0.2-1.0)  mg/dL


 


AST    20  (15-37)  IU/L


 


ALT    27  (14-63)  IU/L


 


Alkaline Phosphatase    110  ()  U/L


 


Ammonia     (19-54)  ug/dL


 


Troponin I     (0.000-0.056)  ng/mL


 


Total Protein    5.1 L  (6.4-8.2)  g/dL


 


Albumin    2.1 L  (3.4-5.0)  g/dL


 


Globulin    3.0  (2.6-4.0)  g/dL


 


Albumin/Globulin Ratio    0.7 L  (0.9-1.6)  


 


Vitamin B12     (193-986)  pg/mL


 


Urine Color     


 


Urine Appearance     


 


Urine pH     (5.0-8.0)  


 


Ur Specific Gravity     (1.001-1.035)  


 


Urine Protein     (NEGATIVE)  mg/dL


 


Urine Glucose (UA)     (NEGATIVE)  mg/dL


 


Urine Ketones     (NEGATIVE)  mg/dL


 


Urine Occult Blood     (NEGATIVE)  


 


Urine Nitrite     (NEGATIVE)  


 


Urine Bilirubin     (NEGATIVE)  


 


Urine Ictotest     


 


Urine Urobilinogen     (<2.0)  EU/dL


 


Ur Leukocyte Esterase     (NEGATIVE)  


 


Urine RBC     (0-2/HPF)  


 


Urine WBC     (0-5/HPF)  


 


Ur Epithelial Cells     (NONE-FEW)  


 


Urine Bacteria     (NEGATIVE)  


 


Urine Opiates Screen     (NEGATIVE)  


 


Ur Oxycodone Screen     (NEGATIVE)  


 


Urine Methadone Screen     (NEGATIVE)  


 


Ur Barbiturates Screen     (NEGATIVE)  


 


Ur Phencyclidine Scrn     (NEGATIVE)  


 


Ur Amphetamine Screen     (NEGATIVE)  


 


U Methamphetamines Scrn     (NEGATIVE)  


 


U Benzodiazepines Scrn     (NEGATIVE)  


 


U Cocaine Metab Screen     (NEGATIVE)  


 


U Marijuana (THC) Screen     (NEGATIVE)  


 


Ethyl Alcohol     mg/dL


 


COVID-19 (R[facility]IGO)     (NEGATIVE)  














  07/28/20 07/28/20 07/28/20 Range/Units





  05:28 09:03 09:30 


 


WBC     (4.0-11.0)  K/uL


 


RBC     (4.30-5.90)  M/uL


 


Hgb     (12.0-16.0)  g/dL


 


Hct     (36.0-46.0)  %


 


MCV     (80.0-98.0)  fL


 


MCH     (27.0-32.0)  pg


 


MCHC     (31.0-37.0)  g/dL


 


RDW Std Deviation     (28.0-62.0)  fl


 


RDW Coeff of Jason     (11.0-15.0)  %


 


Plt Count     (150-400)  K/uL


 


MPV     (7.40-12.00)  fL


 


Neut % (Auto)     (48.0-80.0)  %


 


Lymph % (Auto)     (16.0-40.0)  %


 


Mono % (Auto)     (0.0-15.0)  %


 


Eos % (Auto)     (0.0-7.0)  %


 


Baso % (Auto)     (0.0-1.5)  %


 


Neut # (Auto)     (1.4-5.7)  K/uL


 


Lymph # (Auto)     (0.6-2.4)  K/uL


 


Mono # (Auto)     (0.0-0.8)  K/uL


 


Eos # (Auto)     (0.0-0.7)  K/uL


 


Baso # (Auto)     (0.0-0.1)  K/uL


 


Nucleated RBC %     /100WBC


 


Nucleated RBCs #     K/uL


 


INR     


 


VBG pH     (7.31-7.41)  


 


VBG pCO2     (35-45)  mmHG


 


VBG pO2     (30-40)  mmHG


 


VBG HCO3     (22-30)  mEq/L


 


VBG Total CO2     (41-51)  mmol/L


 


VBG Base Excess     (-3.0-3.0)  


 


Lactate     (0.20-2.00)  mmol/L


 


Sodium     (136-145)  mmol/L


 


Potassium     (3.5-5.1)  mmol/L


 


Chloride     ()  mmol/L


 


Carbon Dioxide     (21.0-32.0)  mmol/L


 


BUN     (7.0-18.0)  mg/dL


 


Creatinine     (0.6-1.0)  mg/dL


 


Est Cr Clr Drug Dosing     


 


Estimated GFR (MDRD)     ml/min


 


Glucose     ()  mg/dL


 


POC Glucose   86  92  ()  mg/dL


 


Calcium     (8.5-10.1)  mg/dL


 


Phosphorus     (2.6-4.7)  mg/dL


 


Magnesium     (1.8-2.4)  mg/dL


 


Iron     ()  ug/dL


 


TIBC     (250-450)  ug/dL


 


% Saturation     (20-55)  %


 


Ferritin     (8-252)  ng/mL


 


Total Bilirubin     (0.2-1.0)  mg/dL


 


AST     (15-37)  IU/L


 


ALT     (14-63)  IU/L


 


Alkaline Phosphatase     ()  U/L


 


Ammonia     (19-54)  ug/dL


 


Troponin I     (0.000-0.056)  ng/mL


 


Total Protein     (6.4-8.2)  g/dL


 


Albumin     (3.4-5.0)  g/dL


 


Globulin     (2.6-4.0)  g/dL


 


Albumin/Globulin Ratio     (0.9-1.6)  


 


Vitamin B12  764    (193-986)  pg/mL


 


Urine Color     


 


Urine Appearance     


 


Urine pH     (5.0-8.0)  


 


Ur Specific Gravity     (1.001-1.035)  


 


Urine Protein     (NEGATIVE)  mg/dL


 


Urine Glucose (UA)     (NEGATIVE)  mg/dL


 


Urine Ketones     (NEGATIVE)  mg/dL


 


Urine Occult Blood     (NEGATIVE)  


 


Urine Nitrite     (NEGATIVE)  


 


Urine Bilirubin     (NEGATIVE)  


 


Urine Ictotest     


 


Urine Urobilinogen     (<2.0)  EU/dL


 


Ur Leukocyte Esterase     (NEGATIVE)  


 


Urine RBC     (0-2/HPF)  


 


Urine WBC     (0-5/HPF)  


 


Ur Epithelial Cells     (NONE-FEW)  


 


Urine Bacteria     (NEGATIVE)  


 


Urine Opiates Screen     (NEGATIVE)  


 


Ur Oxycodone Screen     (NEGATIVE)  


 


Urine Methadone Screen     (NEGATIVE)  


 


Ur Barbiturates Screen     (NEGATIVE)  


 


Ur Phencyclidine Scrn     (NEGATIVE)  


 


Ur Amphetamine Screen     (NEGATIVE)  


 


U Methamphetamines Scrn     (NEGATIVE)  


 


U Benzodiazepines Scrn     (NEGATIVE)  


 


U Cocaine Metab Screen     (NEGATIVE)  


 


U Marijuana (THC) Screen     (NEGATIVE)  


 


Ethyl Alcohol     mg/dL


 


COVID-19 (RODRIGO)     (NEGATIVE)  














  07/28/20 Range/Units





  10:28 


 


WBC   (4.0-11.0)  K/uL


 


RBC   (4.30-5.90)  M/uL


 


Hgb   (12.0-16.0)  g/dL


 


Hct   (36.0-46.0)  %


 


MCV   (80.0-98.0)  fL


 


MCH   (27.0-32.0)  pg


 


MCHC   (31.0-37.0)  g/dL


 


RDW Std Deviation   (28.0-62.0)  fl


 


RDW Coeff of Jason   (11.0-15.0)  %


 


Plt Count   (150-400)  K/uL


 


MPV   (7.40-12.00)  fL


 


Neut % (Auto)   (48.0-80.0)  %


 


Lymph % (Auto)   (16.0-40.0)  %


 


Mono % (Auto)   (0.0-15.0)  %


 


Eos % (Auto)   (0.0-7.0)  %


 


Baso % (Auto)   (0.0-1.5)  %


 


Neut # (Auto)   (1.4-5.7)  K/uL


 


Lymph # (Auto)   (0.6-2.4)  K/uL


 


Mono # (Auto)   (0.0-0.8)  K/uL


 


Eos # (Auto)   (0.0-0.7)  K/uL


 


Baso # (Auto)   (0.0-0.1)  K/uL


 


Nucleated RBC %   /100WBC


 


Nucleated RBCs #   K/uL


 


INR   


 


VBG pH   (7.31-7.41)  


 


VBG pCO2   (35-45)  mmHG


 


VBG pO2   (30-40)  mmHG


 


VBG HCO3   (22-30)  mEq/L


 


VBG Total CO2   (41-51)  mmol/L


 


VBG Base Excess   (-3.0-3.0)  


 


Lactate   (0.20-2.00)  mmol/L


 


Sodium   (136-145)  mmol/L


 


Potassium   (3.5-5.1)  mmol/L


 


Chloride   ()  mmol/L


 


Carbon Dioxide   (21.0-32.0)  mmol/L


 


BUN   (7.0-18.0)  mg/dL


 


Creatinine   (0.6-1.0)  mg/dL


 


Est Cr Clr Drug Dosing   


 


Estimated GFR (MDRD)   ml/min


 


Glucose   ()  mg/dL


 


POC Glucose  103  ()  mg/dL


 


Calcium   (8.5-10.1)  mg/dL


 


Phosphorus   (2.6-4.7)  mg/dL


 


Magnesium   (1.8-2.4)  mg/dL


 


Iron   ()  ug/dL


 


TIBC   (250-450)  ug/dL


 


% Saturation   (20-55)  %


 


Ferritin   (8-252)  ng/mL


 


Total Bilirubin   (0.2-1.0)  mg/dL


 


AST   (15-37)  IU/L


 


ALT   (14-63)  IU/L


 


Alkaline Phosphatase   ()  U/L


 


Ammonia   (19-54)  ug/dL


 


Troponin I   (0.000-0.056)  ng/mL


 


Total Protein   (6.4-8.2)  g/dL


 


Albumin   (3.4-5.0)  g/dL


 


Globulin   (2.6-4.0)  g/dL


 


Albumin/Globulin Ratio   (0.9-1.6)  


 


Vitamin B12   (193-986)  pg/mL


 


Urine Color   


 


Urine Appearance   


 


Urine pH   (5.0-8.0)  


 


Ur Specific Gravity   (1.001-1.035)  


 


Urine Protein   (NEGATIVE)  mg/dL


 


Urine Glucose (UA)   (NEGATIVE)  mg/dL


 


Urine Ketones   (NEGATIVE)  mg/dL


 


Urine Occult Blood   (NEGATIVE)  


 


Urine Nitrite   (NEGATIVE)  


 


Urine Bilirubin   (NEGATIVE)  


 


Urine Ictotest   


 


Urine Urobilinogen   (<2.0)  EU/dL


 


Ur Leukocyte Esterase   (NEGATIVE)  


 


Urine RBC   (0-2/HPF)  


 


Urine WBC   (0-5/HPF)  


 


Ur Epithelial Cells   (NONE-FEW)  


 


Urine Bacteria   (NEGATIVE)  


 


Urine Opiates Screen   (NEGATIVE)  


 


Ur Oxycodone Screen   (NEGATIVE)  


 


Urine Methadone Screen   (NEGATIVE)  


 


Ur Barbiturates Screen   (NEGATIVE)  


 


Ur Phencyclidine Scrn   (NEGATIVE)  


 


Ur Amphetamine Screen   (NEGATIVE)  


 


U Methamphetamines Scrn   (NEGATIVE)  


 


U Benzodiazepines Scrn   (NEGATIVE)  


 


U Cocaine Metab Screen   (NEGATIVE)  


 


U Marijuana (THC) Screen   (NEGATIVE)  


 


Ethyl Alcohol   mg/dL


 


COVID-19 ([facility]IG[facility])   (NEGATIVE)  











[facility] Results Last 24 Hours: 


                                  Microbiology











 07/27/20 17:13 Anaerobic Blood Culture - Final





 Blood - Venous - Lab Draw 











Med Orders - Current: 


                               Current Medications





Acetaminophen (Tylenol)  650 mg PO Q4H PRN


   PRN Reason: Pain (Mild 1-3)/fever


Enoxaparin Sodium (Lovenox)  40 mg SUBCUT Q24H [facility]


   Last Admin: 07/27/20 20:07 Dose:  40 mg


   Documented by: 


Ceftriaxone Sodium/Dextrose 1 (gm/ Premix)  50 mls @ 100 mls/hr IV Q24H [place]


Lorazepam (Ativan)  1 mg PO Q4H PRN


   PRN Reason: Agitation


Ondansetron HCl (Zofran Odt)  4 mg PO Q4H PRN


   PRN Reason: nausea, able to take PO


Ondansetron HCl (Zofran)  4 mg IVPUSH Q4H PRN


   PRN Reason: Nausea


Polysaccharide Iron Complex (Ferrex 150)  150 mg PO DAILY [place]


   Last Admin: 07/28/20 12:38 Dose:  150 mg


   Documented by: 


Sodium Chloride (Saline Flush)  10 ml FLUSH ASDIRECTED PRN


   PRN Reason: Keep Vein Open


   Last Admin: 07/27/20 17:56 Dose:  10 ml


   Documented by: 


Sodium Chloride (Saline Flush)  2.5 ml FLUSH ASDIRECTED PRN


   PRN Reason: Keep Vein Open


   Last Admin: 07/27/20 17:56 Dose:  2.5 ml


   Documented by: 





Discontinued Medications





Ceftriaxone Sodium/Dextrose 1 (gm/ Premix)  50 mls @ 100 mls/hr IV ONETIME ONE


   Stop: 07/27/20 19:09


   Last Admin: 07/27/20 18:52 Dose:  100 mls/hr


   Documented by: 


Magnesium Sulfate 2 gm/ Premix  50 mls @ 50 mls/hr IV ONETIME ONE


   Stop: 07/27/20 19:40


   Last Admin: 07/27/20 18:53 Dose:  50 mls/hr


   Documented by: 


Potassium Chloride 20 meq/ (Premix)  50 mls @ 25 mls/hr IV ONETIME ONE


   Stop: 07/27/20 20:57


   Last Admin: 07/27/20 20:00 Dose:  25 mls/hr


   Documented by: 


Lactated Ringer's (Ringers, Lactated)  1,000 mls @ 100 mls/hr IV ASDIRECTED [facility]


   Stop: 07/28/20 05:59


Lactated Ringer's (Ringers, Lactated)  1,000 mls @ 100 mls/hr IV ASDIRECTED [facility]


   Last Admin: 07/28/20 07:19 Dose:  100 mls/hr


   Documented by: 


Iopamidol (Isovue Multipack-370 (76%))  60 ml IVPUSH ONETIME STA


   Stop: 07/27/20 20:43


   Last Admin: 07/27/20 20:43 Dose:  60 ml


   Documented by: 


Potassium Chloride (Klor-Con M20)  40 meq PO ONETIME ONE


   Stop: 07/28/20 11:08


   Last Admin: 07/28/20 11:41 Dose:  40 meq


   Documented by: 











- Exam


General: Alert, Cooperative, No Acute Distress, Other (AOx2)


HEENT: Pupils Equal, EOMI


Lungs: Clear to Auscultation, Normal Respiratory Effort


Cardiovascular: Regular Rate, Regular Rhythm


GI/Abdominal Exam: Normal Bowel Sounds, Soft, Non-Tender, No Distention


Extremities: Other


Skin: Other (There are two circular lesions measuring approximately 4 cm x 4 cm 

with black-colored eschar on right foot, no drainage appreciated)


Neurological: No New Focal Deficit, Cranial Nerves Intact


Psy/Mental Status: Alert





Sepsis Event Note





- Evaluation


Sepsis Screening Result: No Definite Risk





- Focused Exam


Vital Signs: 


                                   Vital Signs











  Temp Pulse Resp BP Pulse Ox


 


 07/28/20 16:11  37.1 C  86  28 H  109/58 L  85 L


 


 07/28/20 16:06   82  28 H  


 


 07/28/20 12:37      93 L


 


 07/28/20 12:31  36.3 C  89   119/70  89 L


 


 07/28/20 07:26  36.2 C  67   113/58 L  96











Date Exam was Performed: 07/28/20


Time Exam was Performed: 16:51





- Problem List Review


Problem List Initiated/Reviewed/Updated: Yes





- My Orders


Last 24 Hours: 


My Active Orders





07/27/20 Dinner


Mechanical Soft Diet [DIET] 





07/27/20 19:35


Oxygen Therapy [RC] PRN 


Up With Assistance [RC] ASDIRECTED 


VTE/DVT Education [RC] PER UNIT ROUTINE 


Vital Signs [RC] Q4H 


Acetaminophen [Tylenol]   650 mg PO Q4H PRN 


Ondansetron [Zofran ODT]   4 mg PO Q4H PRN 


Ondansetron [Zofran]   4 mg IVPUSH Q4H PRN 


Resuscitation Status Routine 





07/27/20 19:36


Sequential Compression Device [OM.PC] Per Unit Routine 





07/27/20 19:37


Antiembolic Devices [RC] PER UNIT ROUTINE 





07/27/20 19:51


LORazepam [Ativan]   1 mg PO Q4H PRN 





07/27/20 20:00


Enoxaparin [Lovenox]   40 mg SUBCUT Q24H 





07/28/20 07:49


OCCULT BLOOD DIAGNOSTIC [OP] Routine 





07/28/20 11:53


Consult to Wound Care Services [CONS] Routine 





07/28/20 12:15


Iron Polysaccharides Complex [Ferrex 150]   150 mg PO DAILY 





07/28/20 18:00


cefTRIAXone [Rocephin in Dextrose,Iso-Osm 1 GM/50 ML] 1 gm   Premix Bag 1 bag IV

Q24H 














- Plan


Plan:: 





Assessment and Plan:





1. Acute hypoxic and hypercapnic respiratory failure, improved:


- CTA chest showed no acute PE, minimal residual chronic thrombus in RLL, mild 

interstitial edema, mild cardiomegaly and small pericardial effusion. Will order

ECHO. 


- Continue supplemental oxygen.  





2. Altered mental status likely secondary to hypoxic encephalopathy vs UTI:


- Patient's appears more alert and more energetic today. She is currently AOx2. 

Continue IV ceftriaxone 1 g qd. Urine cultures pending. Will continue to 

monitor.


- CT head was negative.





3. Right foot injury:


- Will place wound consult. 


- Right foot x-ray showed soft tissue swelling. Per ER provider note who 

reviewed x-ray, patient may have subacute fracture of 4th metatarsal. Of note, 

patient was seen in ER 2 weeks ago for right foot injury and placed in a walking

boot. Orthopedic referral was provided, however, it is likely patient did not 

follow-up. Will have patient set up for orthopedic referral on discharge Will 

also consider CT for right foot.





4. Microcytic anemia:


- Will start iron supplement. Will also order stool occult blood test.





5. Hypokalemia:


- Will replete and recheck with AM labs.





6. Hyperbilirubinemia, resolved.





7. DVT prophylaxis: lovenox 40 mg subcut qd. 





8. Past medical history of PE, anxiety, depression and PTSD:


- Klonopin and opioid medication held because of patient's altered mental 

status. Will order ativan 1 mg q4 prn for chronic benzodiazepine use.


- Request previous medical records for review.





<Andre Boudreaux - Last Filed: 07/29/20 23:20>





- Patient Data


Vitals - Most Recent: 


                                Last Vital Signs











Temp  36.3 C   07/29/20 15:30


 


Pulse  75   07/29/20 15:30


 


Resp  16   07/29/20 15:30


 


BP  105/58 L  07/29/20 15:30


 


Pulse Ox  96   07/29/20 15:30











I&O - Last 24 Hours: 


                                 Intake & Output











 07/29/20 07/29/20 07/30/20





 14:59 22:59 06:59


 


Intake Total  500 


 


Output Total  600 


 


Balance  -100 











Lab Results Last 24 Hours: 


                         Laboratory Results - last 24 hr











  07/29/20 07/29/20 Range/Units





  05:55 05:55 


 


WBC  3.13 L   (4.0-11.0)  K/uL


 


RBC  3.60 L   (4.30-5.90)  M/uL


 


Hgb  8.0 L   (12.0-16.0)  g/dL


 


Hct  28.3 L   (36.0-46.0)  %


 


MCV  78.6 L   (80.0-98.0)  fL


 


MCH  22.2 L   (27.0-32.0)  pg


 


MCHC  28.3 L   (31.0-37.0)  g/dL


 


RDW Std Deviation  59.2   (28.0-62.0)  fl


 


RDW Coeff of Jason  21 H   (11.0-15.0)  %


 


Plt Count  94 L   (150-400)  K/uL


 


MPV  9.70   (7.40-12.00)  fL


 


Neut % (Auto)  61.7   (48.0-80.0)  %


 


Lymph % (Auto)  29.7   (16.0-40.0)  %


 


Mono % (Auto)  7.3   (0.0-15.0)  %


 


Eos % (Auto)  1.3   (0.0-7.0)  %


 


Baso % (Auto)  0.0   (0.0-1.5)  %


 


Neut # (Auto)  1.9   (1.4-5.7)  K/uL


 


Lymph # (Auto)  0.9   (0.6-2.4)  K/uL


 


Mono # (Auto)  0.2   (0.0-0.8)  K/uL


 


Eos # (Auto)  0.0   (0.0-0.7)  K/uL


 


Baso # (Auto)  0.0   (0.0-0.1)  K/uL


 


Nucleated RBC %  0.0   /100WBC


 


Nucleated RBCs #  0   K/uL


 


Sodium   140  (136-145)  mmol/L


 


Potassium   3.5  (3.5-5.1)  mmol/L


 


Chloride   103  ()  mmol/L


 


Carbon Dioxide   31.3  (21.0-32.0)  mmol/L


 


BUN   4 L  (7.0-18.0)  mg/dL


 


Creatinine   0.7  (0.6-1.0)  mg/dL


 


Est Cr Clr Drug Dosing   87.27  mL/min


 


Estimated GFR (MDRD)   > 60.0  ml/min


 


Glucose   75  ()  mg/dL


 


Calcium   7.4 L  (8.5-10.1)  mg/dL


 


Phosphorus   2.8  (2.6-4.7)  mg/dL


 


Magnesium   1.5 L  (1.8-2.4)  mg/dL


 


Total Bilirubin   0.9  (0.2-1.0)  mg/dL


 


AST   17  (15-37)  IU/L


 


ALT   23  (14-63)  IU/L


 


Alkaline Phosphatase   104  ()  U/L


 


Total Protein   4.9 L  (6.4-8.2)  g/dL


 


Albumin   2.0 L  (3.4-5.0)  g/dL


 


Globulin   2.9  (2.6-4.0)  g/dL


 


Albumin/Globulin Ratio   0.7 L  (0.9-1.6)  











[facility] Results Last 24 Hours: 


                                  Microbiology











 07/27/20 17:13 Aerobic Blood Culture - Preliminary





 Blood - Venous - Lab Draw    NO GROWTH AFTER 2 DAYS





 Anaerobic Blood Culture - Final


 


 07/27/20 16:50 Aerobic Blood Culture - Preliminary





 Blood - Venous    NO GROWTH AFTER 2 DAYS





 Anaerobic Blood Culture - Preliminary





    NO GROWTH AFTER 2 DAYS


 


 07/27/20 18:09 Urine Culture - Final





 Urine, Catheterized    Normal Urogenital Jory











Med Orders - Current: 


                               Current Medications








Discontinued Medications





Acetaminophen (Tylenol)  650 mg PO Q4H PRN


   PRN Reason: Pain (Mild 1-3)/fever


   Last Admin: 07/29/20 08:37 Dose:  650 mg


   Documented by: 


Apixaban (Eliquis)  5 mg PO BID [facility]


   Last Admin: 07/29/20 08:23 Dose:  5 mg


   Documented by: 


Bisacodyl (Dulcolax)  5 mg PO BID PRN


   PRN Reason: Constipation


Docusate Sodium (Colace)  100 mg PO DAILY PRN


   PRN Reason: Constipation


   Last Admin: 07/29/20 09:32 Dose:  100 mg


   Documented by: 


Enoxaparin Sodium (Lovenox)  40 mg SUBCUT Q24H [facility]


   Last Admin: 07/27/20 20:07 Dose:  40 mg


   Documented by: 


Gadobenate Dimeglumine (Multihance)  20 ml IVPUSH ONETIME STA


   Stop: 07/29/20 12:12


   Last Admin: 07/29/20 12:23 Dose:  18 ml


   Documented by: 


Ceftriaxone Sodium/Dextrose 1 (gm/ Premix)  50 mls @ 100 mls/hr IV ONETIME ONE


   Stop: 07/27/20 19:09


   Last Admin: 07/27/20 18:52 Dose:  100 mls/hr


   Documented by: 


Magnesium Sulfate 2 gm/ Premix  50 mls @ 50 mls/hr IV ONETIME ONE


   Stop: 07/27/20 19:40


   Last Admin: 07/27/20 18:53 Dose:  50 mls/hr


   Documented by: 


Potassium Chloride 20 meq/ (Premix)  50 mls @ 25 mls/hr IV ONETIME ONE


   Stop: 07/27/20 20:57


   Last Admin: 07/27/20 20:00 Dose:  25 mls/hr


   Documented by: 


Ceftriaxone Sodium/Dextrose 1 (gm/ Premix)  50 mls @ 100 mls/hr IV Q24H [facility]


   Last Admin: 07/29/20 19:32 Dose:  Not Given


   Documented by: 


Lactated Ringer's (Ringers, Lactated)  1,000 mls @ 100 mls/hr IV ASDIRECTED [facility]


   Stop: 07/28/20 05:59


Lactated Ringer's (Ringers, Lactated)  1,000 mls @ 100 mls/hr IV ASDIRECTED [facility]


   Last Admin: 07/28/20 07:19 Dose:  100 mls/hr


   Documented by: 


Magnesium Sulfate 2 gm/ Premix  50 mls @ 50 mls/hr IV ONETIME ONE


   Stop: 07/29/20 08:03


   Last Admin: 07/29/20 08:23 Dose:  50 mls/hr


   Documented by: 


Iopamidol (Isovue Multipack-370 (76%))  60 ml IVPUSH ONETIME STA


   Stop: 07/27/20 20:43


   Last Admin: 07/27/20 20:43 Dose:  60 ml


   Documented by: 


Lorazepam (Ativan)  1 mg PO Q4H PRN


   PRN Reason: Agitation


Ondansetron HCl (Zofran Odt)  4 mg PO Q4H PRN


   PRN Reason: nausea, able to take PO


Ondansetron HCl (Zofran)  4 mg IVPUSH Q4H PRN


   PRN Reason: Nausea


Umeclidinium Brm/Vilanterol Tr [Anoro Ellipta 62.5-25 Mcg  1 each INH DAILY [facility]


   Last Admin: 07/29/20 08:29 Dose:  Not Given


   Documented by: 


Polysaccharide Iron Complex (Ferrex 150)  150 mg PO DAILY [facility]


   Last Admin: 07/29/20 08:23 Dose:  150 mg


   Documented by: 


Potassium Chloride (Klor-Con M20)  40 meq PO ONETIME ONE


   Stop: 07/28/20 11:08


   Last Admin: 07/28/20 11:41 Dose:  40 meq


   Documented by: 


Sodium Chloride (Saline Flush)  10 ml FLUSH ASDIRECTED PRN


   PRN Reason: Keep Vein Open


   Last Admin: 07/27/20 17:56 Dose:  10 ml


   Documented by: 


Sodium Chloride (Saline Flush)  2.5 ml FLUSH ASDIRECTED PRN


   PRN Reason: Keep Vein Open


   Last Admin: 07/27/20 17:56 Dose:  2.5 ml


   Documented by: 











Sepsis Event Note





- Focused Exam


Vital Signs: 


                                   Vital Signs











  Temp Pulse Resp BP Pulse Ox


 


 07/29/20 15:30  36.3 C  75  16  105/58 L  96


 


 07/29/20 12:02  36.4 C  79   110/56 L  95











Date Exam was Performed: 07/29/20


Time Exam was Performed: 23:20





- Free Text/Narrative


Note: 





I have seen and evaluated the patient with the resident.  I have discussed 

findings and treatment plan with the resident.  I agree with the assessment and 

plan outlined in the following note.

## 2020-07-29 NOTE — [id number]
<Sandro Shore - Last Filed: 07/29/20 17:58>





**Discharge Summary





- Hospital Course


Free Text/Narrative:: 





54-year-old female admitted for acute hypoxic hypercapnic respiratory failure 

and altered mental status. She has a PMH of pulmonary embolism, COPD on home 

oxygen, anxiety, depression and tobacco abuse. On admission, CT head was 

negative and CXR was negative. CTA chest showed no acute PE, minimal residual 

chronic thrombus in RLL, mild cardiomegaly, mild pericardial effusion and mild 

interstitial edema. ECHO ordered and currently pending at time of discharge. UA 

positive for nitrites and esterase. Patient started on IV ceftriaxone. Blood 

cultures negative. Patient was found to be hypercapnic on admission and BiPAP 

was used overnight for the first day but discontinued thereafter. Patient's 

mentation improved steadily throughout her hospital course although she did have

some persistent confusion. MRI brain was ordered which showed no significant 

findings. Recommend neurology follow-up as outpatient. Furthermore, patient 

presented with two circular scabbed lesions on her right foot. She was seen in 

the ER for a right foot injury on 7/10/20 and given walking boot and advised to 

follow-up with orthopedics, however, did not do so. Per patient's , 

lesions on right foot were caused by walking boot so she stopped wearing the 

boot. Right foot x-ray during current admission showed soft tissue swelling and 

no fracture. Wound care consulted and noted that lesions do not appear 

infectious and are dry. Wound care recommended Allevyn dressings and can be 

changed every 3 days. Orthopedic follow-up recommended. PT also evaluated 

patient prior to discharge and noted that patient would benefit from a four-

wheeled walker. Patient discharged in stable condition and advised to follow-up 

with PCP, neurology and orthopedics. She was given script for ciprofloxacin and 

iron supplement. 





- Discharge Data


Discharge Date: 07/29/20


Discharge Disposition: Home, Self-Care 01


Condition: Stable





- Referral to Home Health


Primary Care Physician: 


Quinton Lorenzo MD








- Patient Summary/Data


Consults: 


                                  Consultations





07/28/20 11:53


Consult to Wound Care Services [CONS] Routine 





07/29/20 08:41


Consult to Physical Therapy [PT Evaluation and Treatment] [CONS] Routine 





07/29/20 13:18


Consult to Home Health [CONS] Routine 














- Patient Instructions


Diet: Usual Diet as Tolerated


Activity: As Tolerated


Activity, Other: Use walker


Notify Provider of: Fever, Increased Pain, Swelling and Redness, Drainage, 

Nausea and/or Vomiting





- Discharge Plan


*PRESCRIPTION DRUG MONITORING PROGRAM REVIEWED*: Not Applicable


*COPY OF PRESCRIPTION DRUG MONITORING REPORT IN PATIENT SHERLY: Not Applicable


Prescriptions/Med Rec: 


Ciprofloxacin [Ciprofloxacin HCl] 500 mg PO BID 3 Days #6 tab


Iron Polysaccharides Complex [Ferrex 150] 150 mg PO DAILY 30 Days #30 cap


Home Medications: 


                                    Home Meds





ClonazePAM [KlonoPIN] 2 mg PO TID PRN 05/31/18 [History]


Naproxen [Naprosyn] 500 mg PO Q12HR #20 tab 07/10/20 [Rx]


Apixaban [Eliquis] 5 mg PO BID 07/28/20 [History]


Budesonide [Pulmicort] 1 vial IH BID 07/28/20 [History]


Umeclidinium Brm/Vilanterol Tr [Anoro Ellipta 62.5-25 MCG] 1 puff IH DAILY 

07/28/20 [History]


Ciprofloxacin [Ciprofloxacin HCl] 500 mg PO BID 3 Days #6 tab 07/29/20 [Rx]


Iron Polysaccharides Complex [Ferrex 150] 150 mg PO DAILY 30 Days #30 cap 

07/29/20 [Rx]








Oxygen Therapy Mode: Nasal Cannula


Patient Handouts:  Hypoxia, Iron tablets, capsules, extended-release tablets, 

Urinary Tract Infection, Adult, Easy-to-Read, Ciprofloxacin tablets


Referrals: 


[facility] [Outside]


Abbey Guardado MD [Physician] - 08/13/20 8:00 am


(You will be seeing AprilDr. Allen Nurse Practioner. 





Arrive 15 minutes early with a photo ID, insurance card, and a mask. )


Quinton Lorenzo MD [Primary Care Provider] - 08/05/20 10:15 am (Arrive 15 minutes 

early with a photo ID, insurance card, and a mask. )





- Discharge Summary/Plan Comment


DC Time >30 min.: No





- Patient Data


Vitals - Most Recent: 


                                Last Vital Signs











Temp  36.3 C   07/29/20 15:30


 


Pulse  75   07/29/20 15:30


 


Resp  16   07/29/20 15:30


 


BP  105/58 L  07/29/20 15:30


 


Pulse Ox  96   07/29/20 15:30











Weight - Most Recent: 91.49 kg


I&O - Last 24 hours: 


                                 Intake & Output











 07/29/20 07/29/20 07/29/20





 06:59 14:59 22:59


 


Intake Total 450  500


 


Output Total 1400  600


 


Balance -950  -100











Lab Results - Last 24 hrs: 


                         Laboratory Results - last 24 hr











  07/29/20 07/29/20 Range/Units





  05:55 05:55 


 


WBC  3.13 L   (4.0-11.0)  K/uL


 


RBC  3.60 L   (4.30-5.90)  M/uL


 


Hgb  8.0 L   (12.0-16.0)  g/dL


 


Hct  28.3 L   (36.0-46.0)  %


 


MCV  78.6 L   (80.0-98.0)  fL


 


MCH  22.2 L   (27.0-32.0)  pg


 


MCHC  28.3 L   (31.0-37.0)  g/dL


 


RDW Std Deviation  59.2   (28.0-62.0)  fl


 


RDW Coeff of Jason  21 H   (11.0-15.0)  %


 


Plt Count  94 L   (150-400)  K/uL


 


MPV  9.70   (7.40-12.00)  fL


 


Neut % (Auto)  61.7   (48.0-80.0)  %


 


Lymph % (Auto)  29.7   (16.0-40.0)  %


 


Mono % (Auto)  7.3   (0.0-15.0)  %


 


Eos % (Auto)  1.3   (0.0-7.0)  %


 


Baso % (Auto)  0.0   (0.0-1.5)  %


 


Neut # (Auto)  1.9   (1.4-5.7)  K/uL


 


Lymph # (Auto)  0.9   (0.6-2.4)  K/uL


 


Mono # (Auto)  0.2   (0.0-0.8)  K/uL


 


Eos # (Auto)  0.0   (0.0-0.7)  K/uL


 


Baso # (Auto)  0.0   (0.0-0.1)  K/uL


 


Nucleated RBC %  0.0   /100WBC


 


Nucleated RBCs #  0   K/uL


 


Sodium   140  (136-145)  mmol/L


 


Potassium   3.5  (3.5-5.1)  mmol/L


 


Chloride   103  ()  mmol/L


 


Carbon Dioxide   31.3  (21.0-32.0)  mmol/L


 


BUN   4 L  (7.0-18.0)  mg/dL


 


Creatinine   0.7  (0.6-1.0)  mg/dL


 


Est Cr Clr Drug Dosing   87.27  mL/min


 


Estimated GFR (MDRD)   > 60.0  ml/min


 


Glucose   75  ()  mg/dL


 


Calcium   7.4 L  (8.5-10.1)  mg/dL


 


Phosphorus   2.8  (2.6-4.7)  mg/dL


 


Magnesium   1.5 L  (1.8-2.4)  mg/dL


 


Total Bilirubin   0.9  (0.2-1.0)  mg/dL


 


AST   17  (15-37)  IU/L


 


ALT   23  (14-63)  IU/L


 


Alkaline Phosphatase   104  ()  U/L


 


Total Protein   4.9 L  (6.4-8.2)  g/dL


 


Albumin   2.0 L  (3.4-5.0)  g/dL


 


Globulin   2.9  (2.6-4.0)  g/dL


 


Albumin/Globulin Ratio   0.7 L  (0.9-1.6)  











GABRIEL Results - Last 24 hrs: 


                                  Microbiology











 07/27/20 17:13 Aerobic Blood Culture - Preliminary





 Blood - Venous - Lab Draw    NO GROWTH AFTER 2 DAYS





 Anaerobic Blood Culture - Final


 


 07/27/20 16:50 Aerobic Blood Culture - Preliminary





 Blood - Venous    NO GROWTH AFTER 2 DAYS





 Anaerobic Blood Culture - Preliminary





    NO GROWTH AFTER 2 DAYS


 


 07/27/20 18:09 Urine Culture - Final





 Urine, Catheterized    Normal Urogenital Jory











Med Orders - Current: 


                               Current Medications





Acetaminophen (Tylenol)  650 mg PO Q4H PRN


   PRN Reason: Pain (Mild 1-3)/fever


   Last Admin: 07/29/20 08:37 Dose:  650 mg


   Documented by: 


Apixaban (Eliquis)  5 mg PO BID [facility]


   Last Admin: 07/29/20 08:23 Dose:  5 mg


   Documented by: 


Bisacodyl (Dulcolax)  5 mg PO BID PRN


   PRN Reason: Constipation


Docusate Sodium (Colace)  100 mg PO DAILY PRN


   PRN Reason: Constipation


   Last Admin: 07/29/20 09:32 Dose:  100 mg


   Documented by: 


Ceftriaxone Sodium/Dextrose 1 (gm/ Premix)  50 mls @ 100 mls/hr IV Q24H [facility]


   Last Admin: 07/28/20 18:34 Dose:  100 mls/hr


   Documented by: 


Lorazepam (Ativan)  1 mg PO Q4H PRN


   PRN Reason: Agitation


Ondansetron HCl (Zofran Odt)  4 mg PO Q4H PRN


   PRN Reason: nausea, able to take PO


Ondansetron HCl (Zofran)  4 mg IVPUSH Q4H PRN


   PRN Reason: Nausea


Umeclidinium Brm/Vilanterol Tr [Anoro Ellipta 62.5-25 Mcg  1 each INH DAILY [facility]


   Last Admin: 07/29/20 08:29 Dose:  Not Given


   Documented by: 


Polysaccharide Iron Complex (Ferrex 150)  150 mg PO DAILY [facility]


   Last Admin: 07/29/20 08:23 Dose:  150 mg


   Documented by: 


Sodium Chloride (Saline Flush)  10 ml FLUSH ASDIRECTED PRN


   PRN Reason: Keep Vein Open


   Last Admin: 07/27/20 17:56 Dose:  10 ml


   Documented by: 


Sodium Chloride (Saline Flush)  2.5 ml FLUSH ASDIRECTED PRN


   PRN Reason: Keep Vein Open


   Last Admin: 07/27/20 17:56 Dose:  2.5 ml


   Documented by: 





Discontinued Medications





Enoxaparin Sodium (Lovenox)  40 mg SUBCUT Q24H [facility]


   Last Admin: 07/27/20 20:07 Dose:  40 mg


   Documented by: 


Gadobenate Dimeglumine (Multihance)  20 ml IVPUSH ONETIME STA


   Stop: 07/29/20 12:12


   Last Admin: 07/29/20 12:23 Dose:  18 ml


   Documented by: 


Ceftriaxone Sodium/Dextrose 1 (gm/ Premix)  50 mls @ 100 mls/hr IV ONETIME ONE


   Stop: 07/27/20 19:09


   Last Admin: 07/27/20 18:52 Dose:  100 mls/hr


   Documented by: 


Magnesium Sulfate 2 gm/ Premix  50 mls @ 50 mls/hr IV ONETIME ONE


   Stop: 07/27/20 19:40


   Last Admin: 07/27/20 18:53 Dose:  50 mls/hr


   Documented by: 


Potassium Chloride 20 meq/ (Premix)  50 mls @ 25 mls/hr IV ONETIME ONE


   Stop: 07/27/20 20:57


   Last Admin: 07/27/20 20:00 Dose:  25 mls/hr


   Documented by: 


Lactated Ringer's (Ringers, Lactated)  1,000 mls @ 100 mls/hr IV ASDIRECTED [place]


   Stop: 07/28/20 05:59


Lactated Ringer's (Ringers, Lactated)  1,000 mls @ 100 mls/hr IV ASDIRECTED [facility]


   Last Admin: 07/28/20 07:19 Dose:  100 mls/hr


   Documented by: 


Magnesium Sulfate 2 gm/ Premix  50 mls @ 50 mls/hr IV ONETIME ONE


   Stop: 07/29/20 08:03


   Last Admin: 07/29/20 08:23 Dose:  50 mls/hr


   Documented by: 


Iopamidol (Isovue Multipack-370 (76%))  60 ml IVPUSH ONETIME STA


   Stop: 07/27/20 20:43


   Last Admin: 07/27/20 20:43 Dose:  60 ml


   Documented by: 


Potassium Chloride (Klor-Con M20)  40 meq PO ONETIME ONE


   Stop: 07/28/20 11:08


   Last Admin: 07/28/20 11:41 Dose:  40 meq


   Documented by: 











<Andre Boudreaux - Last Filed: 07/29/20 23:14>





**Discharge Summary





- Referral to Home Health


Primary Care Physician: 


Quinton Lorenzo MD








- Patient Summary/Data


Consults: 


                                  Consultations





07/28/20 11:53


Consult to Wound Care Services [CONS] Routine 





07/29/20 08:41


Consult to Physical Therapy [PT Evaluation and Treatment] [CONS] Routine 





07/29/20 13:18


Consult to Home Health [CONS] Routine 














- Patient Data


Vitals - Most Recent: 


                                Last Vital Signs











Temp  36.3 C   07/29/20 15:30


 


Pulse  75   07/29/20 15:30


 


Resp  16   07/29/20 15:30


 


BP  105/58 L  07/29/20 15:30


 


Pulse Ox  96   07/29/20 15:30











I&O - Last 24 hours: 


                                 Intake & Output











 07/29/20 07/29/20 07/30/20





 14:59 22:59 06:59


 


Intake Total  500 


 


Output Total  600 


 


Balance  -100 











Lab Results - Last 24 hrs: 


                         Laboratory Results - last 24 hr











  07/29/20 07/29/20 Range/Units





  05:55 05:55 


 


WBC  3.13 L   (4.0-11.0)  K/uL


 


RBC  3.60 L   (4.30-5.90)  M/uL


 


Hgb  8.0 L   (12.0-16.0)  g/dL


 


Hct  28.3 L   (36.0-46.0)  %


 


MCV  78.6 L   (80.0-98.0)  fL


 


MCH  22.2 L   (27.0-32.0)  pg


 


MCHC  28.3 L   (31.0-37.0)  g/dL


 


RDW Std Deviation  59.2   (28.0-62.0)  fl


 


RDW Coeff of Jason  21 H   (11.0-15.0)  %


 


Plt Count  94 L   (150-400)  K/uL


 


MPV  9.70   (7.40-12.00)  fL


 


Neut % (Auto)  61.7   (48.0-80.0)  %


 


Lymph % (Auto)  29.7   (16.0-40.0)  %


 


Mono % (Auto)  7.3   (0.0-15.0)  %


 


Eos % (Auto)  1.3   (0.0-7.0)  %


 


Baso % (Auto)  0.0   (0.0-1.5)  %


 


Neut # (Auto)  1.9   (1.4-5.7)  K/uL


 


Lymph # (Auto)  0.9   (0.6-2.4)  K/uL


 


Mono # (Auto)  0.2   (0.0-0.8)  K/uL


 


Eos # (Auto)  0.0   (0.0-0.7)  K/uL


 


Baso # (Auto)  0.0   (0.0-0.1)  K/uL


 


Nucleated RBC %  0.0   /100WBC


 


Nucleated RBCs #  0   K/uL


 


Sodium   140  (136-145)  mmol/L


 


Potassium   3.5  (3.5-5.1)  mmol/L


 


Chloride   103  ()  mmol/L


 


Carbon Dioxide   31.3  (21.0-32.0)  mmol/L


 


BUN   4 L  (7.0-18.0)  mg/dL


 


Creatinine   0.7  (0.6-1.0)  mg/dL


 


Est Cr Clr Drug Dosing   87.27  mL/min


 


Estimated GFR (MDRD)   > 60.0  ml/min


 


Glucose   75  ()  mg/dL


 


Calcium   7.4 L  (8.5-10.1)  mg/dL


 


Phosphorus   2.8  (2.6-4.7)  mg/dL


 


Magnesium   1.5 L  (1.8-2.4)  mg/dL


 


Total Bilirubin   0.9  (0.2-1.0)  mg/dL


 


AST   17  (15-37)  IU/L


 


ALT   23  (14-63)  IU/L


 


Alkaline Phosphatase   104  ()  U/L


 


Total Protein   4.9 L  (6.4-8.2)  g/dL


 


Albumin   2.0 L  (3.4-5.0)  g/dL


 


Globulin   2.9  (2.6-4.0)  g/dL


 


Albumin/Globulin Ratio   0.7 L  (0.9-1.6)  











GABRIEL Results - Last 24 hrs: 


                                  Microbiology











 07/27/20 17:13 Aerobic Blood Culture - Preliminary





 Blood - Venous - Lab Draw    NO GROWTH AFTER 2 DAYS





 Anaerobic Blood Culture - Final


 


 07/27/20 16:50 Aerobic Blood Culture - Preliminary





 Blood - Venous    NO GROWTH AFTER 2 DAYS





 Anaerobic Blood Culture - Preliminary





    NO GROWTH AFTER 2 DAYS


 


 07/27/20 18:09 Urine Culture - Final





 Urine, Catheterized    Normal Urogenital Jory











Med Orders - Current: 


                               Current Medications








Discontinued Medications





Acetaminophen (Tylenol)  650 mg PO Q4H PRN


   PRN Reason: Pain (Mild 1-3)/fever


   Last Admin: 07/29/20 08:37 Dose:  650 mg


   Documented by: 


Apixaban (Eliquis)  5 mg PO BID [facility]


   Last Admin: 07/29/20 08:23 Dose:  5 mg


   Documented by: 


Bisacodyl (Dulcolax)  5 mg PO BID PRN


   PRN Reason: Constipation


Docusate Sodium (Colace)  100 mg PO DAILY PRN


   PRN Reason: Constipation


   Last Admin: 07/29/20 09:32 Dose:  100 mg


   Documented by: 


Enoxaparin Sodium (Lovenox)  40 mg SUBCUT Q24H [facility]


   Last Admin: 07/27/20 20:07 Dose:  40 mg


   Documented by: 


Gadobenate Dimeglumine (Multihance)  20 ml IVPUSH ONETIME STA


   Stop: 07/29/20 12:12


   Last Admin: 07/29/20 12:23 Dose:  18 ml


   Documented by: 


Ceftriaxone Sodium/Dextrose 1 (gm/ Premix)  50 mls @ 100 mls/hr IV ONETIME ONE


   Stop: 07/27/20 19:09


   Last Admin: 07/27/20 18:52 Dose:  100 mls/hr


   Documented by: 


Magnesium Sulfate 2 gm/ Premix  50 mls @ 50 mls/hr IV ONETIME ONE


   Stop: 07/27/20 19:40


   Last Admin: 07/27/20 18:53 Dose:  50 mls/hr


   Documented by: 


Potassium Chloride 20 meq/ (Premix)  50 mls @ 25 mls/hr IV ONETIME ONE


   Stop: 07/27/20 20:57


   Last Admin: 07/27/20 20:00 Dose:  25 mls/hr


   Documented by: 


Ceftriaxone Sodium/Dextrose 1 (gm/ Premix)  50 mls @ 100 mls/hr IV Q24H [facility]


   Last Admin: 07/29/20 19:32 Dose:  Not Given


   Documented by: 


Lactated Ringer's (Ringers, Lactated)  1,000 mls @ 100 mls/hr IV ASDIRECTED [facility]


   Stop: 07/28/20 05:59


Lactated Ringer's (Ringers, Lactated)  1,000 mls @ 100 mls/hr IV ASDIRECTED [facility]


   Last Admin: 07/28/20 07:19 Dose:  100 mls/hr


   Documented by: 


Magnesium Sulfate 2 gm/ Premix  50 mls @ 50 mls/hr IV ONETIME ONE


   Stop: 07/29/20 08:03


   Last Admin: 07/29/20 08:23 Dose:  50 mls/hr


   Documented by: 


Iopamidol (Isovue Multipack-370 (76%))  60 ml IVPUSH ONETIME STA


   Stop: 07/27/20 20:43


   Last Admin: 07/27/20 20:43 Dose:  60 ml


   Documented by: 


Lorazepam (Ativan)  1 mg PO Q4H PRN


   PRN Reason: Agitation


Ondansetron HCl (Zofran Odt)  4 mg PO Q4H PRN


   PRN Reason: nausea, able to take PO


Ondansetron HCl (Zofran)  4 mg IVPUSH Q4H PRN


   PRN Reason: Nausea


Umeclidinium Brm/Vilanterol Tr [Anoro Ellipta 62.5-25 Mcg  1 each INH DAILY [facility]


   Last Admin: 07/29/20 08:29 Dose:  Not Given


   Documented by: 


Polysaccharide Iron Complex (Ferrex 150)  150 mg PO DAILY [facility]


   Last Admin: 07/29/20 08:23 Dose:  150 mg


   Documented by: 


Potassium Chloride (Klor-Con M20)  40 meq PO ONETIME ONE


   Stop: 07/28/20 11:08


   Last Admin: 07/28/20 11:41 Dose:  40 meq


   Documented by: 


Sodium Chloride (Saline Flush)  10 ml FLUSH ASDIRECTED PRN


   PRN Reason: Keep Vein Open


   Last Admin: 07/27/20 17:56 Dose:  10 ml


   Documented by: 


Sodium Chloride (Saline Flush)  2.5 ml FLUSH ASDIRECTED PRN


   PRN Reason: Keep Vein Open


   Last Admin: 07/27/20 17:56 Dose:  2.5 ml


   Documented by: 











- Free Text/Narrative


Note: 





I have seen and evaluated the patient with the resident.  I have discussed 

findings and treatment plan with the resident.  I agree with the assessment and 

plan outlined in the following note.

## 2020-07-29 NOTE — PCM.PN
<Sandro Shore M - Last Filed: 07/29/20 14:05>





- General Info


Date of Service: 07/29/20


Subjective Update: 





No complaints at bedside this morning. Patient is alert and oriented to person 

and place. Responding to questions appropriately. 





- Patient Data


Vitals - Most Recent: 


                                Last Vital Signs











Temp  36.4 C   07/29/20 12:02


 


Pulse  79   07/29/20 12:02


 


Resp  16   07/29/20 04:21


 


BP  110/56 L  07/29/20 12:02


 


Pulse Ox  95   07/29/20 12:02











Weight - Most Recent: 91.49 kg


I&O - Last 24 Hours: 


                                 Intake & Output











 07/28/20 07/29/20 07/29/20





 22:59 06:59 14:59


 


Intake Total  450 


 


Output Total  1400 


 


Balance  -950 











Lab Results Last 24 Hours: 


                         Laboratory Results - last 24 hr











  07/29/20 07/29/20 Range/Units





  05:55 05:55 


 


WBC  3.13 L   (4.0-11.0)  K/uL


 


RBC  3.60 L   (4.30-5.90)  M/uL


 


Hgb  8.0 L   (12.0-16.0)  g/dL


 


Hct  28.3 L   (36.0-46.0)  %


 


MCV  78.6 L   (80.0-98.0)  fL


 


MCH  22.2 L   (27.0-32.0)  pg


 


MCHC  28.3 L   (31.0-37.0)  g/dL


 


RDW Std Deviation  59.2   (28.0-62.0)  fl


 


RDW Coeff of Jason  21 H   (11.0-15.0)  %


 


Plt Count  94 L   (150-400)  K/uL


 


MPV  9.70   (7.40-12.00)  fL


 


Neut % (Auto)  61.7   (48.0-80.0)  %


 


Lymph % (Auto)  29.7   (16.0-40.0)  %


 


Mono % (Auto)  7.3   (0.0-15.0)  %


 


Eos % (Auto)  1.3   (0.0-7.0)  %


 


Baso % (Auto)  0.0   (0.0-1.5)  %


 


Neut # (Auto)  1.9   (1.4-5.7)  K/uL


 


Lymph # (Auto)  0.9   (0.6-2.4)  K/uL


 


Mono # (Auto)  0.2   (0.0-0.8)  K/uL


 


Eos # (Auto)  0.0   (0.0-0.7)  K/uL


 


Baso # (Auto)  0.0   (0.0-0.1)  K/uL


 


Nucleated RBC %  0.0   /100WBC


 


Nucleated RBCs #  0   K/uL


 


Sodium   140  (136-145)  mmol/L


 


Potassium   3.5  (3.5-5.1)  mmol/L


 


Chloride   103  ()  mmol/L


 


Carbon Dioxide   31.3  (21.0-32.0)  mmol/L


 


BUN   4 L  (7.0-18.0)  mg/dL


 


Creatinine   0.7  (0.6-1.0)  mg/dL


 


Est Cr Clr Drug Dosing   87.27  mL/min


 


Estimated GFR (MDRD)   > 60.0  ml/min


 


Glucose   75  ()  mg/dL


 


Calcium   7.4 L  (8.5-10.1)  mg/dL


 


Phosphorus   2.8  (2.6-4.7)  mg/dL


 


Magnesium   1.5 L  (1.8-2.4)  mg/dL


 


Total Bilirubin   0.9  (0.2-1.0)  mg/dL


 


AST   17  (15-37)  IU/L


 


ALT   23  (14-63)  IU/L


 


Alkaline Phosphatase   104  ()  U/L


 


Total Protein   4.9 L  (6.4-8.2)  g/dL


 


Albumin   2.0 L  (3.4-5.0)  g/dL


 


Globulin   2.9  (2.6-4.0)  g/dL


 


Albumin/Globulin Ratio   0.7 L  (0.9-1.6)  











[facility] Results Last 24 Hours: 


                                  Microbiology











 07/27/20 18:09 Urine Culture - Final





 Urine, Catheterized    Normal Urogenital Jory


 


 07/27/20 17:13 Aerobic Blood Culture - Preliminary





 Blood - Venous - Lab Draw    NO GROWTH AFTER 1 DAY





 Anaerobic Blood Culture - Final


 


 07/27/20 16:50 Aerobic Blood Culture - Preliminary





 Blood - Venous    NO GROWTH AFTER 1 DAY





 Anaerobic Blood Culture - Preliminary





    NO GROWTH AFTER 1 DAY











Med Orders - Current: 


                               Current Medications





Acetaminophen (Tylenol)  650 mg PO Q4H PRN


   PRN Reason: Pain (Mild 1-3)/fever


   Last Admin: 07/29/20 08:37 Dose:  650 mg


   Documented by: 


Apixaban (Eliquis)  5 mg PO BID [facility]


   Last Admin: 07/29/20 08:23 Dose:  5 mg


   Documented by: 


Bisacodyl (Dulcolax)  5 mg PO BID PRN


   PRN Reason: Constipation


Docusate Sodium (Colace)  100 mg PO DAILY PRN


   PRN Reason: Constipation


   Last Admin: 07/29/20 09:32 Dose:  100 mg


   Documented by: 


Ceftriaxone Sodium/Dextrose 1 (gm/ Premix)  50 mls @ 100 mls/hr IV Q24H [facility]


   Last Admin: 07/28/20 18:34 Dose:  100 mls/hr


   Documented by: 


Lorazepam (Ativan)  1 mg PO Q4H PRN


   PRN Reason: Agitation


Ondansetron HCl (Zofran Odt)  4 mg PO Q4H PRN


   PRN Reason: nausea, able to take PO


Ondansetron HCl (Zofran)  4 mg IVPUSH Q4H PRN


   PRN Reason: Nausea


Umeclidinium Brm/Vilanterol Tr [Anoro Ellipta 62.5-25 Mcg  1 each INH DAILY [facility]


   Last Admin: 07/29/20 08:29 Dose:  Not Given


   Documented by: 


Polysaccharide Iron Complex (Ferrex 150)  150 mg PO DAILY [facility]


   Last Admin: 07/29/20 08:23 Dose:  150 mg


   Documented by: 


Sodium Chloride (Saline Flush)  10 ml FLUSH ASDIRECTED PRN


   PRN Reason: Keep Vein Open


   Last Admin: 07/27/20 17:56 Dose:  10 ml


   Documented by: 


Sodium Chloride (Saline Flush)  2.5 ml FLUSH ASDIRECTED PRN


   PRN Reason: Keep Vein Open


   Last Admin: 07/27/20 17:56 Dose:  2.5 ml


   Documented by: 





Discontinued Medications





Enoxaparin Sodium (Lovenox)  40 mg SUBCUT Q24H [facility]


   Last Admin: 07/27/20 20:07 Dose:  40 mg


   Documented by: 


Gadobenate Dimeglumine (Multihance)  20 ml IVPUSH ONETIME STA


   Stop: 07/29/20 12:12


   Last Admin: 07/29/20 12:23 Dose:  18 ml


   Documented by: 


Ceftriaxone Sodium/Dextrose 1 (gm/ Premix)  50 mls @ 100 mls/hr IV ONETIME ONE


   Stop: 07/27/20 19:09


   Last Admin: 07/27/20 18:52 Dose:  100 mls/hr


   Documented by: 


Magnesium Sulfate 2 gm/ Premix  50 mls @ 50 mls/hr IV ONETIME ONE


   Stop: 07/27/20 19:40


   Last Admin: 07/27/20 18:53 Dose:  50 mls/hr


   Documented by: 


Potassium Chloride 20 meq/ (Premix)  50 mls @ 25 mls/hr IV ONETIME ONE


   Stop: 07/27/20 20:57


   Last Admin: 07/27/20 20:00 Dose:  25 mls/hr


   Documented by: 


Lactated Ringer's (Ringers, Lactated)  1,000 mls @ 100 mls/hr IV ASDIRECTED [facility]


   Stop: 07/28/20 05:59


Lactated Ringer's (Ringers, Lactated)  1,000 mls @ 100 mls/hr IV ASDIRECTED [facility]


   Last Admin: 07/28/20 07:19 Dose:  100 mls/hr


   Documented by: 


Magnesium Sulfate 2 gm/ Premix  50 mls @ 50 mls/hr IV ONETIME ONE


   Stop: 07/29/20 08:03


   Last Admin: 07/29/20 08:23 Dose:  50 mls/hr


   Documented by: 


Iopamidol (Isovue Multipack-370 (76%))  60 ml IVPUSH ONETIME STA


   Stop: 07/27/20 20:43


   Last Admin: 07/27/20 20:43 Dose:  60 ml


   Documented by: 


Potassium Chloride (Klor-Con M20)  40 meq PO ONETIME ONE


   Stop: 07/28/20 11:08


   Last Admin: 07/28/20 11:41 Dose:  40 meq


   Documented by: 











- Exam


General: Alert, Cooperative, No Acute Distress


Lungs: Clear to Auscultation, Normal Respiratory Effort


Cardiovascular: Regular Rate, Regular Rhythm


GI/Abdominal Exam: Normal Bowel Sounds, Soft, Non-Tender, No Distention


Skin: Other (Two circular lesions measuring approximately 4 cm x 4 cm, black-

colored scab on right foot, dry)


Neurological: No New Focal Deficit





Sepsis Event Note





- Evaluation


Sepsis Screening Result: No Definite Risk





- Focused Exam


Vital Signs: 


                                   Vital Signs











  Temp Pulse Resp BP Pulse Ox


 


 07/29/20 12:02  36.4 C  79   110/56 L  95


 


 07/29/20 07:33  37.1 C  78   107/58 L  93 L


 


 07/29/20 04:21  36.2 C  78  16  103/58 L  92 L











Date Exam was Performed: 07/29/20


Time Exam was Performed: 14:05





- Problem List Review


Problem List Initiated/Reviewed/Updated: Yes





- My Orders


Last 24 Hours: 


My Active Orders





07/28/20 18:00


cefTRIAXone [Rocephin in Dextrose,Iso-Osm 1 GM/50 ML] 1 gm   Premix Bag 1 bag IV

Q24H 





07/28/20 21:00


Apixaban [Eliquis]   5 mg PO BID 





07/29/20 08:41


Consult to Physical Therapy [PT Evaluation and Treatment] [CONS] Routine 





07/29/20 08:42


bisacodyL [Dulcolax]   5 mg PO BID PRN 





07/29/20 08:44


Docusate Sodium [Colace]   100 mg PO DAILY PRN 





07/29/20 09:00


Patient's Own Medication [Ptom]   1 each INH DAILY 





07/29/20 Lunch


Regular Diet [DIET] 





07/29/20 13:18


Consult to Home Health [CONS] Routine 





07/30/20 05:11


CBC WITH AUTO DIFF [HEME] AM 


COMPREHENSIVE METABOLIC PN,CMP [CHEM] AM 


MAGNESIUM [CHEM] AM 














- Plan


Plan:: 





Assessment and Plan:





1. Acute hypoxic and hypercapnic respiratory failure, stable:


- CTA chest showed no acute PE, minimal residual chronic thrombus in RLL, mild 

interstitial edema, mild cardiomegaly and small pericardial effusion. ECHO 

currently pending.


- Continue supplemental oxygen.  





2. Altered mental status likely secondary to hypoxic encephalopathy vs UTI, 

stable:


- Patient AOx2 but more alert and responding to questions appropriately today. 

Continue IV ceftriaxone 1 g qd. Will continue to monitor.


- CT head was negative. MRI brain showed no acute findings.


- Recommend neurology follow-up as outpatient.





3. Right foot injury:


- Per wound care right foot lesions do not appear to be infected at this time. 

Will avoid walking boot at this time as the boot is what caused the lesions to 

develop per . Physical therapy consult placed. Right foot x-ray showed 

soft tissue swelling. Patient will require orthopedic referral on discharge.





4. Microcytic anemia:


- Will start iron supplement. Stool occult blood test pending.





5. Hypomagnesemia:


- Replete and check with AM labs.





6. DVT prophylaxis: Patient home dose of Eliquis resumed. SCD's placed.





7. Past medical history of PE, anxiety, depression and PTSD:


- Klonopin and opioid medication held because of patient's altered mental 

status. Ativan 1 mg q4 prn for chronic benzodiazepine use.








<BoudreauxAndre YOLANDA - Last Filed: 07/29/20 23:15>





- Patient Data


Vitals - Most Recent: 


                                Last Vital Signs











Temp  36.3 C   07/29/20 15:30


 


Pulse  75   07/29/20 15:30


 


Resp  16   07/29/20 15:30


 


BP  105/58 L  07/29/20 15:30


 


Pulse Ox  96   07/29/20 15:30











I&O - Last 24 Hours: 


                                 Intake & Output











 07/29/20 07/29/20 07/30/20





 14:59 22:59 06:59


 


Intake Total  500 


 


Output Total  600 


 


Balance  -100 











Lab Results Last 24 Hours: 


                         Laboratory Results - last 24 hr











  07/29/20 07/29/20 Range/Units





  05:55 05:55 


 


WBC  3.13 L   (4.0-11.0)  K/uL


 


RBC  3.60 L   (4.30-5.90)  M/uL


 


Hgb  8.0 L   (12.0-16.0)  g/dL


 


Hct  28.3 L   (36.0-46.0)  %


 


MCV  78.6 L   (80.0-98.0)  fL


 


MCH  22.2 L   (27.0-32.0)  pg


 


MCHC  28.3 L   (31.0-37.0)  g/dL


 


RDW Std Deviation  59.2   (28.0-62.0)  fl


 


RDW Coeff of Jason  21 H   (11.0-15.0)  %


 


Plt Count  94 L   (150-400)  K/uL


 


MPV  9.70   (7.40-12.00)  fL


 


Neut % (Auto)  61.7   (48.0-80.0)  %


 


Lymph % (Auto)  29.7   (16.0-40.0)  %


 


Mono % (Auto)  7.3   (0.0-15.0)  %


 


Eos % (Auto)  1.3   (0.0-7.0)  %


 


Baso % (Auto)  0.0   (0.0-1.5)  %


 


Neut # (Auto)  1.9   (1.4-5.7)  K/uL


 


Lymph # (Auto)  0.9   (0.6-2.4)  K/uL


 


Mono # (Auto)  0.2   (0.0-0.8)  K/uL


 


Eos # (Auto)  0.0   (0.0-0.7)  K/uL


 


Baso # (Auto)  0.0   (0.0-0.1)  K/uL


 


Nucleated RBC %  0.0   /100WBC


 


Nucleated RBCs #  0   K/uL


 


Sodium   140  (136-145)  mmol/L


 


Potassium   3.5  (3.5-5.1)  mmol/L


 


Chloride   103  ()  mmol/L


 


Carbon Dioxide   31.3  (21.0-32.0)  mmol/L


 


BUN   4 L  (7.0-18.0)  mg/dL


 


Creatinine   0.7  (0.6-1.0)  mg/dL


 


Est Cr Clr Drug Dosing   87.27  mL/min


 


Estimated GFR (MDRD)   > 60.0  ml/min


 


Glucose   75  ()  mg/dL


 


Calcium   7.4 L  (8.5-10.1)  mg/dL


 


Phosphorus   2.8  (2.6-4.7)  mg/dL


 


Magnesium   1.5 L  (1.8-2.4)  mg/dL


 


Total Bilirubin   0.9  (0.2-1.0)  mg/dL


 


AST   17  (15-37)  IU/L


 


ALT   23  (14-63)  IU/L


 


Alkaline Phosphatase   104  ()  U/L


 


Total Protein   4.9 L  (6.4-8.2)  g/dL


 


Albumin   2.0 L  (3.4-5.0)  g/dL


 


Globulin   2.9  (2.6-4.0)  g/dL


 


Albumin/Globulin Ratio   0.7 L  (0.9-1.6)  











[facility] Results Last 24 Hours: 


                                  Microbiology











 07/27/20 17:13 Aerobic Blood Culture - Preliminary





 Blood - Venous - Lab Draw    NO GROWTH AFTER 2 DAYS





 Anaerobic Blood Culture - Final


 


 07/27/20 16:50 Aerobic Blood Culture - Preliminary





 Blood - Venous    NO GROWTH AFTER 2 DAYS





 Anaerobic Blood Culture - Preliminary





    NO GROWTH AFTER 2 DAYS


 


 07/27/20 18:09 Urine Culture - Final





 Urine, Catheterized    Normal Urogenital Jory











Med Orders - Current: 


                               Current Medications








Discontinued Medications





Acetaminophen (Tylenol)  650 mg PO Q4H PRN


   PRN Reason: Pain (Mild 1-3)/fever


   Last Admin: 07/29/20 08:37 Dose:  650 mg


   Documented by: 


Apixaban (Eliquis)  5 mg PO BID [facility]


   Last Admin: 07/29/20 08:23 Dose:  5 mg


   Documented by: 


Bisacodyl (Dulcolax)  5 mg PO BID PRN


   PRN Reason: Constipation


Docusate Sodium (Colace)  100 mg PO DAILY PRN


   PRN Reason: Constipation


   Last Admin: 07/29/20 09:32 Dose:  100 mg


   Documented by: 


Enoxaparin Sodium (Lovenox)  40 mg SUBCUT Q24H [facility]


   Last Admin: 07/27/20 20:07 Dose:  40 mg


   Documented by: 


Gadobenate Dimeglumine (Multihance)  20 ml IVPUSH ONETIME STA


   Stop: 07/29/20 12:12


   Last Admin: 07/29/20 12:23 Dose:  18 ml


   Documented by: 


Ceftriaxone Sodium/Dextrose 1 (gm/ Premix)  50 mls @ 100 mls/hr IV ONETIME ONE


   Stop: 07/27/20 19:09


   Last Admin: 07/27/20 18:52 Dose:  100 mls/hr


   Documented by: 


Magnesium Sulfate 2 gm/ Premix  50 mls @ 50 mls/hr IV ONETIME ONE


   Stop: 07/27/20 19:40


   Last Admin: 07/27/20 18:53 Dose:  50 mls/hr


   Documented by: 


Potassium Chloride 20 meq/ (Premix)  50 mls @ 25 mls/hr IV ONETIME ONE


   Stop: 07/27/20 20:57


   Last Admin: 07/27/20 20:00 Dose:  25 mls/hr


   Documented by: 


Ceftriaxone Sodium/Dextrose 1 (gm/ Premix)  50 mls @ 100 mls/hr IV Q24H [facility]


   Last Admin: 07/29/20 19:32 Dose:  Not Given


   Documented by: 


Lactated Ringer's (Ringers, Lactated)  1,000 mls @ 100 mls/hr IV ASDIRECTED [facility]


   Stop: 07/28/20 05:59


Lactated Ringer's (Ringers, Lactated)  1,000 mls @ 100 mls/hr IV ASDIRECTED [facility]


   Last Admin: 07/28/20 07:19 Dose:  100 mls/hr


   Documented by: 


Magnesium Sulfate 2 gm/ Premix  50 mls @ 50 mls/hr IV ONETIME ONE


   Stop: 07/29/20 08:03


   Last Admin: 07/29/20 08:23 Dose:  50 mls/hr


   Documented by: 


Iopamidol (Isovue Multipack-370 (76%))  60 ml IVPUSH ONETIME STA


   Stop: 07/27/20 20:43


   Last Admin: 07/27/20 20:43 Dose:  60 ml


   Documented by: 


Lorazepam (Ativan)  1 mg PO Q4H PRN


   PRN Reason: Agitation


Ondansetron HCl (Zofran Odt)  4 mg PO Q4H PRN


   PRN Reason: nausea, able to take PO


Ondansetron HCl (Zofran)  4 mg IVPUSH Q4H PRN


   PRN Reason: Nausea


Umeclidinium Brm/Vilanterol Tr [Anoro Ellipta 62.5-25 Mcg  1 each INH DAILY [facility]


   Last Admin: 07/29/20 08:29 Dose:  Not Given


   Documented by: 


Polysaccharide Iron Complex (Ferrex 150)  150 mg PO DAILY [facility]


   Last Admin: 07/29/20 08:23 Dose:  150 mg


   Documented by: 


Potassium Chloride (Klor-Con M20)  40 meq PO ONETIME ONE


   Stop: 07/28/20 11:08


   Last Admin: 07/28/20 11:41 Dose:  40 meq


   Documented by: 


Sodium Chloride (Saline Flush)  10 ml FLUSH ASDIRECTED PRN


   PRN Reason: Keep Vein Open


   Last Admin: 07/27/20 17:56 Dose:  10 ml


   Documented by: 


Sodium Chloride (Saline Flush)  2.5 ml FLUSH ASDIRECTED PRN


   PRN Reason: Keep Vein Open


   Last Admin: 07/27/20 17:56 Dose:  2.5 ml


   Documented by: 











Sepsis Event Note





- Focused Exam


Vital Signs: 


                                   Vital Signs











  Temp Pulse Resp BP Pulse Ox


 


 07/29/20 15:30  36.3 C  75  16  105/58 L  96


 


 07/29/20 12:02  36.4 C  79   110/56 L  95











Date Exam was Performed: 07/29/20


Time Exam was Performed: 23:14





- Free Text/Narrative


Note: 





I have seen and evaluated the patient with the resident.  I have discussed 

findings and treatment plan with the resident.  I agree with the assessment and 

plan outlined in the following note.

## 2020-07-29 NOTE — MR
MRI brain (without and with intravenous contrast)

 

Technique: T1 sagittal and coronal; T1, FLAIR, T2 and diffusion axial;

 postcontrast T1 fat-suppressed axial, coronal and sagittal images 

were obtained.

 

Comparison: Prior head CT study of 07/27/20.

 

Findings: Ventricles along with basal cisterns and sulci over the 

convexities are mildly prominent.  Normal signal void is seen within 

the major cerebral arteries within the skull base.

 

Small retention cyst measuring 1 cm is noted within the inferior left 

maxillary sinus.  Small retention cyst with the left side of sphenoid 

sinus is noted measuring 5 mm.  Diffuse opacification of the left 

mastoid sinus is seen.  Minimal mucosal thickening within the right 

mastoid sinus.

 

On the FLAIR sequence there is increased signal seen within the 

periventricular and subcortical white matter.  No other abnormal 

signal is seen within the brain parenchyma.  No midline shift or 

mass-effect is seen.

 

No acute diffusion abnormalities are appreciated.  No abnormal 

enhancement is seen within the brain parenchyma.

 

Impression:

1.  Increased signal within the periventricular and subcortical white 

matter most likely due to small vessel ischemic demyelination change. 

 Mild generalized atrophy is seen.

2.  Minimal retention cyst within the paranasal sinuses as noted above

 which are felt to be pre-existing and chronic.

3.  Opacification of a large portion left mastoid sinus is seen.  This

 is most likely chronic if patient has no symptoms of mastoiditis.

4.  No additional abnormality is appreciated.

 

Diagnostic code #3

 

This report was dictated in MDT

## 2020-08-04 NOTE — ECHO
EXAM DATE: 07/27/20



PATIENT'S AGE: 54



The ECHO report has been scanned into [employer] and can be seen in this patient's
EMR (Electronic Medical Record) under the REPORTS section. The report has also 
been scanned into PACS.



KANDY

## 2020-10-26 NOTE — PCM.HP.2
H&P History of Present Illness





- General


Date of Service: 10/26/20


Admit Problem/Dx: 


                           Admission Diagnosis/Problem





Admission Diagnosis/Problem      Altered mental status











- History of Present Illness


Initial Comments - Free Text/Narative: 





Patient is a 53 y/o F with PMH of pulmonary embolism, anxiety, PTSD and tobacco 

abuse, possible COPD?  who presented today with her  for altered status. 

 states that for the past few days she has been confused and has been 

acting off from her baseline.  He also reports the patient does have home O2 but

doesnt use it all the time.  Per family she acts like that when she has UTI , 

they called their PCP who recommended to go to ER. Patient herself denied any 

symptoms. States she feel comfortable.  Patient was noted to be saturating 70% 

on room air and was placed on nasal cannula which improved her saturation to 99%

on 3Lts. UA came back positive for UTI, antibiotics were started.





Patient was admitted for similar complaints in July, at that time stroke was 

ruled out and she was treated for UTI and sent home on Home O2. Patient is being

admitted for further care. 





- Related Data


Allergies/Adverse Reactions: 


                                    Allergies











Allergy/AdvReac Type Severity Reaction Status Date / Time


 


codeine Allergy  Hives Verified 10/26/20 16:59


 


pseudoephedrine Allergy  palpitation Verified 10/26/20 16:59





   s  


 


Sulfa (Sulfonamide Allergy  unknown Verified 10/26/20 16:59





Antibiotics)     











Home Medications: 


                                    Home Meds





ClonazePAM [KlonoPIN] 2 mg PO TID PRN 05/31/18 [History]


Umeclidinium Brm/Vilanterol Tr [Anoro Ellipta 62.5-25 MCG] 1 puff IH DAILY 

07/28/20 [History]


Iron Polysaccharides Complex [Ferrex 150] 150 mg PO DAILY 30 Days #30 cap 

07/29/20 [Rx]


Furosemide 40 mg PO DAILY 10/26/20 [History]


Levothyroxine 25 mcg PO DAILY 10/26/20 [History]


Apixaban [Eliquis] 5 mg PO BID  tablet 10/29/20 [Rx]


Potassium Chloride 10 meq PO DAILY 30 Days #30 tablet.er 10/29/20 [Rx]











Past Medical History


Cardiovascular History: Reports: Blood Clots/VTE/DVT


Respiratory History: Reports: COPD, SOB


OB/GYN History: Reports: Pregnancy


Psychiatric History: Reports: Anxiety, Depression, PTSD





- Infectious Disease History


Infectious Disease History: Reports: Chicken Pox, Measles, Mumps





- Past Surgical History


HEENT Surgical History: Reports: Adenoidectomy, Tonsillectomy


GI Surgical History: Reports: Appendectomy, Cholecystectomy, Colostomy, Hernia, 

Abdominal, Other (See Below)


Other GI Surgeries/Procedures: prior colostomy and ileostomy


Female  Surgical History: Reports: Hysterectomy, Salpingo-Oophorectomy


Musculoskeletal Surgical History: Reports: Other (See Below)


Other Musculoskeletal Surgeries/Procedures:: right thumb surgery





Social & Family History





- Family History


Family Medical History: Noncontributory





- Tobacco Use


Tobacco Use Status *Q: Current Every Day Tobacco User


Years of Tobacco use: 30


Packs/Tins Daily: 0.5





- Caffeine Use


Caffeine Use: Reports: None





- Recreational Drug Use


Recreational Drug Use: No





H&P Review of Systems





- Review of Systems:


Review Of Systems: See Below


General: Denies: Fever, Chills, Malaise


Pulmonary: Denies: Shortness of Breath, Wheezing, Pleuritic Chest Pain, Cough


Gastrointestinal: Denies: Abdominal Pain, Anorexia, Black Stool


Genitourinary: Denies: Dysuria, Frequency, Burning


Musculoskeletal: Denies: Neck Pain, Shoulder Pain, Arm Pain, Back Pain


Skin: Denies: Cyanosis, Jaundice, Mottled, Pallor





Exam





- Exam


Exam: See Below





- Vital Signs


Vital Signs: 


                                Last Vital Signs











Temp  35.8 C L  10/26/20 16:53


 


Pulse  86   10/26/20 20:01


 


Resp  20   10/26/20 20:01


 


BP  107/59 L  10/26/20 20:01


 


Pulse Ox  96   10/26/20 20:01











Weight: 76.204 kg





- Exam


Quality Assessment: Supplemental Oxygen


General: Alert, Cooperative.  No: Oriented


Neck: Supple, Trachea Midline


Lungs: Clear to Auscultation, Normal Respiratory Effort


GI/Abdominal Exam: Normal Bowel Sounds, Soft, Non-Tender


Extremities: Normal Inspection, Normal Range of Motion, Non-Tender





- Patient Data


Lab Results Last 24 hrs: 


                         Laboratory Results - last 24 hr











  10/26/20 10/26/20 10/26/20 Range/Units





  17:04 17:04 17:04 


 


WBC  6.30    (4.0-11.0)  K/uL


 


RBC  4.69    (4.30-5.90)  M/uL


 


Hgb  11.7 L    (12.0-16.0)  g/dL


 


Hct  39.4    (36.0-46.0)  %


 


MCV  84.0    (80.0-98.0)  fL


 


MCH  24.9 L    (27.0-32.0)  pg


 


MCHC  29.7 L    (31.0-37.0)  g/dL


 


RDW Std Deviation  57.2    (28.0-62.0)  fl


 


RDW Coeff of Jason  19 H    (11.0-15.0)  %


 


Plt Count  142 L    (150-400)  K/uL


 


MPV  9.90    (7.40-12.00)  fL


 


Neut % (Auto)  65.9    (48.0-80.0)  %


 


Lymph % (Auto)  23.5    (16.0-40.0)  %


 


Mono % (Auto)  9.8    (0.0-15.0)  %


 


Eos % (Auto)  0.6    (0.0-7.0)  %


 


Baso % (Auto)  0.2    (0.0-1.5)  %


 


Neut # (Auto)  4.2    (1.4-5.7)  K/uL


 


Lymph # (Auto)  1.5    (0.6-2.4)  K/uL


 


Mono # (Auto)  0.6    (0.0-0.8)  K/uL


 


Eos # (Auto)  0.0    (0.0-0.7)  K/uL


 


Baso # (Auto)  0.0    (0.0-0.1)  K/uL


 


Nucleated RBC %  0.0    /100WBC


 


Nucleated RBCs #  0    K/uL


 


VBG pH     (7.31-7.41)  


 


VBG pCO2     (35-45)  mmHG


 


VBG pO2     (30-40)  mmHG


 


VBG HCO3     (22-30)  mEq/L


 


VBG Total CO2     (41-51)  mmol/L


 


VBG Base Excess     (-3.0-3.0)  


 


Lactate   1.3   (0.20-2.00)  mmol/L


 


Sodium    136  (136-145)  mmol/L


 


Potassium    2.6 L  (3.5-5.1)  mmol/L


 


Chloride    96 L  ()  mmol/L


 


Carbon Dioxide    29.3  (21.0-32.0)  mmol/L


 


BUN    13  (7.0-18.0)  mg/dL


 


Creatinine    0.7  (0.6-1.0)  mg/dL


 


Est Cr Clr Drug Dosing    90.53  mL/min


 


Estimated GFR (MDRD)    > 60.0  ml/min


 


Glucose    79  ()  mg/dL


 


Calcium    9.2  (8.5-10.1)  mg/dL


 


Phosphorus    3.2  (2.6-4.7)  mg/dL


 


Magnesium    1.5 L  (1.8-2.4)  mg/dL


 


Total Bilirubin    1.5 H  (0.2-1.0)  mg/dL


 


AST    32  (15-37)  IU/L


 


ALT    17  (14-63)  IU/L


 


Alkaline Phosphatase    135 H  ()  U/L


 


Total Protein    7.5  (6.4-8.2)  g/dL


 


Albumin    3.5  (3.4-5.0)  g/dL


 


Globulin    4.0  (2.6-4.0)  g/dL


 


Albumin/Globulin Ratio    0.9  (0.9-1.6)  


 


Urine Color     


 


Urine Appearance     


 


Urine pH     (5.0-8.0)  


 


Ur Specific Gravity     (1.001-1.035)  


 


Urine Protein     (NEGATIVE)  mg/dL


 


Urine Glucose (UA)     (NEGATIVE)  mg/dL


 


Urine Ketones     (NEGATIVE)  mg/dL


 


Urine Occult Blood     (NEGATIVE)  


 


Urine Nitrite     (NEGATIVE)  


 


Urine Bilirubin     (NEGATIVE)  


 


Urine Ictotest     


 


Urine Urobilinogen     (<2.0)  EU/dL


 


Ur Leukocyte Esterase     (NEGATIVE)  


 


Urine RBC     (0-2/HPF)  


 


Urine WBC     (0-5/HPF)  


 


Ur Epithelial Cells     (NONE-FEW)  


 


Urine Bacteria     (NEGATIVE)  


 


Urine Mucus     (NONE-MOD)  


 


Ethyl Alcohol    < 3.0  mg/dL


 


SARS-CoV-2 RNA (RODRIGO)     (NEGATIVE)  














  10/26/20 10/26/20 10/26/20 Range/Units





  17:04 17:10 19:15 


 


WBC     (4.0-11.0)  K/uL


 


RBC     (4.30-5.90)  M/uL


 


Hgb     (12.0-16.0)  g/dL


 


Hct     (36.0-46.0)  %


 


MCV     (80.0-98.0)  fL


 


MCH     (27.0-32.0)  pg


 


MCHC     (31.0-37.0)  g/dL


 


RDW Std Deviation     (28.0-62.0)  fl


 


RDW Coeff of Jason     (11.0-15.0)  %


 


Plt Count     (150-400)  K/uL


 


MPV     (7.40-12.00)  fL


 


Neut % (Auto)     (48.0-80.0)  %


 


Lymph % (Auto)     (16.0-40.0)  %


 


Mono % (Auto)     (0.0-15.0)  %


 


Eos % (Auto)     (0.0-7.0)  %


 


Baso % (Auto)     (0.0-1.5)  %


 


Neut # (Auto)     (1.4-5.7)  K/uL


 


Lymph # (Auto)     (0.6-2.4)  K/uL


 


Mono # (Auto)     (0.0-0.8)  K/uL


 


Eos # (Auto)     (0.0-0.7)  K/uL


 


Baso # (Auto)     (0.0-0.1)  K/uL


 


Nucleated RBC %     /100WBC


 


Nucleated RBCs #     K/uL


 


VBG pH  7.37    (7.31-7.41)  


 


VBG pCO2  55 H    (35-45)  mmHG


 


VBG pO2  24 L    (30-40)  mmHG


 


VBG HCO3  32 H    (22-30)  mEq/L


 


VBG Total CO2  30 L    (41-51)  mmol/L


 


VBG Base Excess  5.2 H    (-3.0-3.0)  


 


Lactate     (0.20-2.00)  mmol/L


 


Sodium     (136-145)  mmol/L


 


Potassium     (3.5-5.1)  mmol/L


 


Chloride     ()  mmol/L


 


Carbon Dioxide     (21.0-32.0)  mmol/L


 


BUN     (7.0-18.0)  mg/dL


 


Creatinine     (0.6-1.0)  mg/dL


 


Est Cr Clr Drug Dosing     mL/min


 


Estimated GFR (MDRD)     ml/min


 


Glucose     ()  mg/dL


 


Calcium     (8.5-10.1)  mg/dL


 


Phosphorus     (2.6-4.7)  mg/dL


 


Magnesium     (1.8-2.4)  mg/dL


 


Total Bilirubin     (0.2-1.0)  mg/dL


 


AST     (15-37)  IU/L


 


ALT     (14-63)  IU/L


 


Alkaline Phosphatase     ()  U/L


 


Total Protein     (6.4-8.2)  g/dL


 


Albumin     (3.4-5.0)  g/dL


 


Globulin     (2.6-4.0)  g/dL


 


Albumin/Globulin Ratio     (0.9-1.6)  


 


Urine Color    YELLOW  


 


Urine Appearance    CLEAR  


 


Urine pH    6.0  (5.0-8.0)  


 


Ur Specific Gravity    >= 1.030  (1.001-1.035)  


 


Urine Protein    TRACE H  (NEGATIVE)  mg/dL


 


Urine Glucose (UA)    NEGATIVE  (NEGATIVE)  mg/dL


 


Urine Ketones    >=80  (NEGATIVE)  mg/dL


 


Urine Occult Blood    NEGATIVE  (NEGATIVE)  


 


Urine Nitrite    POSITIVE H  (NEGATIVE)  


 


Urine Bilirubin    MODERATE H  (NEGATIVE)  


 


Urine Ictotest    NEGATIVE  


 


Urine Urobilinogen    1.0  (<2.0)  EU/dL


 


Ur Leukocyte Esterase    TRACE H  (NEGATIVE)  


 


Urine RBC    0-2  (0-2/HPF)  


 


Urine WBC    15-20  (0-5/HPF)  


 


Ur Epithelial Cells    FEW  (NONE-FEW)  


 


Urine Bacteria    2+ H  (NEGATIVE)  


 


Urine Mucus    LIGHT  (NONE-MOD)  


 


Ethyl Alcohol     mg/dL


 


SARS-CoV-2 RNA (RODRIGO)   NEGATIVE   (NEGATIVE)  











Result Diagrams: 


                                 10/29/20 05:07





                                 10/29/20 05:07





Sepsis Event Note





- Evaluation


Sepsis Screening Result: No Definite Risk





- Focused Exam


Vital Signs: 


                                   Vital Signs











  Temp Pulse Resp BP Pulse Ox


 


 10/26/20 20:01   86  20  107/59 L  96


 


 10/26/20 19:02   85  20  117/49 L  90 L


 


 10/26/20 17:00   80  20   96


 


 10/26/20 16:53  35.8 C L  86  18  136/53 L  70 L














- Problem List


(1) AMS (altered mental status)


SNOMED Code(s): 738248746


   ICD Code: R41.82 - ALTERED MENTAL STATUS, UNSPECIFIED   Status: Acute   

Current Visit: Yes   





(2) Acute respiratory failure with hypoxia


SNOMED Code(s): 87053127, 192607213


   ICD Code: J96.01 - ACUTE RESPIRATORY FAILURE WITH HYPOXIA   Status: Acute   

Current Visit: No   





(3) UTI, Urinary tract infectious disease


SNOMED Code(s): 06089352


   ICD Code: N39.0 - URINARY TRACT INFECTION, SITE NOT SPECIFIED   Status: Acute

   Current Visit: No   





(4) COPD (chronic obstructive pulmonary disease)


SNOMED Code(s): 07858808


   ICD Code: J44.9 - CHRONIC OBSTRUCTIVE PULMONARY DISEASE, UNSPECIFIED   

Status: Acute   Current Visit: Yes   





(5) History of pulmonary embolus (PE)


SNOMED Code(s): 666088751


   ICD Code: Z86.711 - PERSONAL HISTORY OF PULMONARY EMBOLISM   Status: Acute   

Current Visit: Yes   





(6) UTI (urinary tract infection)


SNOMED Code(s): 54473684


   ICD Code: N39.0 - URINARY TRACT INFECTION, SITE NOT SPECIFIED   Status: Acute

   Current Visit: Yes   


Problem List Initiated/Reviewed/Updated: Yes


Orders Last 24hrs: 


                               Active Orders 24 hr











 Category Date Time Status


 


 Patient Status [ADT] Routine ADT  10/26/20 19:08 Active


 


 Ambulate [RC] ASDIRECTED Care  10/26/20 20:32 Active


 


 Antiembolic Devices [RC] PER UNIT ROUTINE Care  10/26/20 20:34 Active


 


 EKG 12 Lead [EKG Documentation Completion] [RC] STAT Care  10/26/20 17:15 

Active


 


 Oxygen Therapy [RC] PRN Care  10/26/20 20:32 Active


 


 Pulse Oximetry [RC] ASDIRECTED Care  10/26/20 17:03 Active


 


 RT Aerosol Therapy [RC] ASDIRECTED Care  10/26/20 20:34 Active


 


 VTE/DVT Education [RC] PER UNIT ROUTINE Care  10/26/20 20:32 Active


 


 Vital Signs [RC] Q4H Care  10/26/20 20:32 Active


 


 Heart Healthy Diet [DIET] Diet  10/27/20 Breakfast Active


 


 Head wo Cont [CT] Stat Exams  10/26/20 18:50 Taken


 


 BLOOD GAS ARTERIAL [BG] Stat Lab  10/26/20 17:04 Ordered


 


 CULTURE BLOOD [BC] Stat Lab  10/26/20 17:04 Received


 


 CULTURE BLOOD [BC] Stat Lab  10/26/20 17:10 Received


 


 CULTURE URINE [RM] Stat Lab  10/26/20 20:32 Ordered


 


 DRUG SCREEN, URINE [URCHEM] Stat Lab  10/26/20 20:38 Ordered


 


 Albuterol/Ipratropium [DuoNeb 3.0-0.5 MG/3 ML] Med  10/26/20 20:32 Active





 3 ml NEB Q4HRRT PRN   


 


 Ondansetron [Zofran] Med  10/26/20 20:32 Active





 4 mg IVPUSH Q4H PRN   


 


 Sodium Chloride 0.9% [Saline Flush] Med  10/26/20 17:03 Active





 10 ml FLUSH ASDIRECTED PRN   


 


 Sodium Chloride 0.9% [Saline Flush] Med  10/26/20 17:03 Active





 2.5 ml FLUSH ASDIRECTED PRN   


 


 cefTRIAXone [Rocephin in Dextrose,Iso-Osm 1 GM/50 ML] 1 Med  10/27/20 09:00 

Active





 gm   





 Premix Bag 1 bag   





 IV Q24H   


 


 Blood Culture x2 Reflex Set [OM.PC] Stat Ot  10/26/20 17:04 Ordered


 


 Saline Lock Insert [OM.PC] Stat Ot  10/26/20 17:03 Ordered


 


 Sequential Compression Device [OM.PC] Per Unit Routine Ot  10/26/20 20:33 

Ordered








                                Medication Orders





Albuterol/Ipratropium (Duoneb 3.0-0.5 Mg/3 Ml)  3 ml NEB Q4HRRT PRN


   PRN Reason: Shortness Of Breath/wheezing


Ceftriaxone Sodium/Dextrose 1 (gm/ Premix)  50 mls @ 100 mls/hr IV Q24H [place]


Ondansetron HCl (Zofran)  4 mg IVPUSH Q4H PRN


   PRN Reason: Nausea/Vomiting


Sodium Chloride (Saline Flush)  10 ml FLUSH ASDIRECTED PRN


   PRN Reason: Keep Vein Open


   Last Admin: 10/26/20 20:00  Dose: 10 ml


   Documented by: LILIANA


Sodium Chloride (Saline Flush)  2.5 ml FLUSH ASDIRECTED PRN


   PRN Reason: Keep Vein Open


   Last Admin: 10/26/20 20:00  Dose: 2.5 ml


   Documented by: [id number]








Assessment/Plan Comment:: 





53 y/o F admitted for AMS, UTI and Acute over chronic hypoxic respiratory failu

re


Hypoxia likely due to non compliance with o2, h/o smoking, possible underlying 

COPD given Co2 is also marginally elevated chronically


AMS likely secondary to hypoxia vs UTI 


Obtain UDS


f/u on CT scan report, pending 


CXR noted, no consolidation or pneumothorax


cont oxygenation via NC to maintain pulse oxy >/= 88%


cont Rocephin for UTI


f/u on urine cultures


SCD for dvt ppx

## 2020-10-26 NOTE — CR
Indication:



Hypoxia



Technique:



Chest 1 view



Comparison:



Chest x-ray 07/27/2020



Findings/Impression:



Cardiovascular and mediastinum: Heart size and vasculature are normal in 

caliber and appearance. 



Lungs and pleural space: No pleural effusion or pneumothorax.  Mild 

bronchial wall thickening which can be seen in bronchitis or reactive 

airways disease. Mild reticular interstitial prominence, right perihilar, 

which is similar to the prior exam. 



Bones and soft tissues: No acute findings.



Dictated by Demetri Villa MD @ Oct 26 2020  6:10PM



Signed by Dr. Demetri Villa @ Oct 26 2020  6:11PM

## 2020-10-26 NOTE — PCM.SN.2
- Free Text/Narrative


Note: 





Urine report came back and I discussed it with Dr. Dasilva.  Reviewing the old 

chart she had had a prior Klebsiella infection sensitive to ceftriaxone.  Orders

for antibiotics were given and the patient will be admitted.

## 2020-10-26 NOTE — EDM.PDOC
ED HPI GENERAL MEDICAL PROBLEM





- General


Chief Complaint: General


Stated Complaint: dementia/uti/disorientation


Time Seen by Provider: 10/26/20 16:51


Source of Information: Reports: Patient, Family


History Limitations: Reports: Altered Mental Status





- History of Present Illness


INITIAL COMMENTS - FREE TEXT/NARRATIVE: 





History of present illness:


[]


Patient is a 54-year-old female who presented today with her  for altered

 status.   states that for the past few days she has been confused not 

acting herself.  He also reports the patient does have home O2 but has not 

occasionally been using.  He believes the patient may have a UTI and spoke to 

her primary care physician told her bring patient here.  Patient herself denies 

any confusion fever chills nausea vomiting or shortness of breath.  Patient was 

noted to be satting 70% on room air and was placed on nasal cannula currently at

 3 L at 100% now.








Past medical history: 


As per history of present illness and as reviewed below otherwise 

noncontributory.





Surgical history: 


As per history of present illness and as reviewed below otherwise 

noncontributory.





Social history: 


No reported history of drug or alcohol abuse.





Family history: 


As per history of present illness and as reviewed below otherwise 

noncontributory.








Definitive disposition and diagnosis as appropriate pending reevaluation and 

review of above.








- Related Data


                                    Allergies











Allergy/AdvReac Type Severity Reaction Status Date / Time


 


codeine Allergy  Hives Verified 10/26/20 16:59


 


pseudoephedrine Allergy  palpitation Verified 10/26/20 16:59





   s  


 


Sulfa (Sulfonamide Allergy  unknown Verified 10/26/20 16:59





Antibiotics)     











Home Meds: 


                                    Home Meds





ClonazePAM [KlonoPIN] 2 mg PO TID PRN 05/31/18 [History]


Apixaban [Eliquis] 5 mg PO BID 07/28/20 [History]


Budesonide [Pulmicort] 1 vial IH BID 07/28/20 [History]


Umeclidinium Brm/Vilanterol Tr [Anoro Ellipta 62.5-25 MCG] 1 puff IH DAILY 

07/28/20 [History]


Iron Polysaccharides Complex [Ferrex 150] 150 mg PO DAILY 30 Days #30 cap 

07/29/20 [Rx]


Furosemide 40 mg PO DAILY 10/26/20 [History]


Levothyroxine 1 tab PO DAILY 10/26/20 [History]











Past Medical History


Cardiovascular History: Reports: Blood Clots/VTE/DVT


Respiratory History: Reports: COPD, SOB


OB/GYN History: Reports: Pregnancy


Psychiatric History: Reports: Anxiety, Depression, PTSD





- Infectious Disease History


Infectious Disease History: Reports: Chicken Pox, Measles, Mumps





- Past Surgical History


HEENT Surgical History: Reports: Adenoidectomy, Tonsillectomy


GI Surgical History: Reports: Appendectomy, Cholecystectomy, Colostomy, Hernia, 

Abdominal, Other (See Below)


Other GI Surgeries/Procedures: prior colostomy and ileostomy


Female  Surgical History: Reports: Hysterectomy, Salpingo-Oophorectomy


Musculoskeletal Surgical History: Reports: Other (See Below)


Other Musculoskeletal Surgeries/Procedures:: right thumb surgery





Social & Family History





- Family History


Family Medical History: Noncontributory





- Tobacco Use


Tobacco Use Status *Q: Current Every Day Tobacco User


Years of Tobacco use: 30


Packs/Tins Daily: 0.5





- Caffeine Use


Caffeine Use: Reports: None





- Recreational Drug Use


Recreational Drug Use: No





ED ROS GENERAL





- Review of Systems


Review Of Systems: See Below


Constitutional: Reports: No Symptoms


HEENT: Reports: No Symptoms


Respiratory: Reports: Shortness of Breath


Cardiovascular: Reports: No Symptoms


Endocrine: Reports: No Symptoms


GI/Abdominal: Reports: No Symptoms


: Reports: Dysuria


Musculoskeletal: Reports: No Symptoms


Skin: Reports: No Symptoms


Neurological: Reports: Confusion


Hematologic/Lymphatic: Reports: No Symptoms


Immunologic: Reports: No Symptoms





ED EXAM, GENERAL





- Physical Exam


Exam: See Below


Exam Limited By: Altered Mental Status


General Appearance: Alert


Eye Exam: Bilateral Eye: EOMI, PERRL


Ears: Normal External Exam


Head: Atraumatic


Neck: Normal Inspection


Respiratory/Chest: Lungs Clear, Normal Breath Sounds, Respiratory Distress


Cardiovascular: Regular Rate, Rhythm


GI/Abdominal: Soft, Non-Tender


Neurological: Alert, Oriented, CN II-XII Intact





Course





- Vital Signs


Last Recorded V/S: 


                                Last Vital Signs











Temp  96.5 F L  10/26/20 16:53


 


Pulse  86   10/26/20 16:53


 


Resp  18   10/26/20 16:53


 


BP  136/53 L  10/26/20 16:53


 


Pulse Ox  70 L  10/26/20 16:53














- Orders/Labs/Meds


Orders: 


                               Active Orders 24 hr











 Category Date Time Status


 


 Patient Status [ADT] Routine ADT  10/26/20 19:08 Active


 


 EKG 12 Lead [EKG Documentation Completion] [RC] STAT Care  10/26/20 17:15 

Active


 


 Pulse Oximetry [RC] ASDIRECTED Care  10/26/20 17:03 Active


 


 Head wo Cont [CT] Stat Exams  10/26/20 18:50 Taken


 


 BLOOD GAS ARTERIAL [BG] Stat Lab  10/26/20 17:04 Ordered


 


 CULTURE BLOOD [BC] Stat Lab  10/26/20 17:04 Received


 


 CULTURE BLOOD [BC] Stat Lab  10/26/20 17:10 Received


 


 UA W/GABRIEL RFLX IF INDICATED [URIN] Stat Lab  10/26/20 17:04 Ordered


 


 Potassium Chloride Riders [KCL 40 MEQ in Water 100 ML] Med  10/26/20 17:51 

Active





 40 meq   





 Premix Bag 1 bag   





 IV ONETIME   


 


 Sodium Chloride 0.9% [Saline Flush] Med  10/26/20 17:03 Active





 10 ml FLUSH ASDIRECTED PRN   


 


 Sodium Chloride 0.9% [Saline Flush] Med  10/26/20 17:03 Active





 2.5 ml FLUSH ASDIRECTED PRN   


 


 Blood Culture x2 Reflex Set [OM.PC] Stat Oth  10/26/20 17:04 Ordered


 


 Saline Lock Insert [OM.PC] Stat Oth  10/26/20 17:03 Ordered








                                Medication Orders





Potassium Chloride 40 meq/ (Premix)  100 mls @ 25 mls/hr IV ONETIME ONE


   Stop: 10/26/20 21:50


   Last Admin: 10/26/20 19:00  Dose: 25 mls/hr


   Documented by: [id number]


Sodium Chloride (Saline Flush)  10 ml FLUSH ASDIRECTED PRN


   PRN Reason: Keep Vein Open


Sodium Chloride (Saline Flush)  2.5 ml FLUSH ASDIRECTED PRN


   PRN Reason: Keep Vein Open








Labs: 


                                Laboratory Tests











  10/26/20 10/26/20 10/26/20 Range/Units





  17:04 17:04 17:04 


 


WBC  6.30    (4.0-11.0)  K/uL


 


RBC  4.69    (4.30-5.90)  M/uL


 


Hgb  11.7 L    (12.0-16.0)  g/dL


 


Hct  39.4    (36.0-46.0)  %


 


MCV  84.0    (80.0-98.0)  fL


 


MCH  24.9 L    (27.0-32.0)  pg


 


MCHC  29.7 L    (31.0-37.0)  g/dL


 


RDW Std Deviation  57.2    (28.0-62.0)  fl


 


RDW Coeff of Jason  19 H    (11.0-15.0)  %


 


Plt Count  142 L    (150-400)  K/uL


 


MPV  9.90    (7.40-12.00)  fL


 


Neut % (Auto)  65.9    (48.0-80.0)  %


 


Lymph % (Auto)  23.5    (16.0-40.0)  %


 


Mono % (Auto)  9.8    (0.0-15.0)  %


 


Eos % (Auto)  0.6    (0.0-7.0)  %


 


Baso % (Auto)  0.2    (0.0-1.5)  %


 


Neut # (Auto)  4.2    (1.4-5.7)  K/uL


 


Lymph # (Auto)  1.5    (0.6-2.4)  K/uL


 


Mono # (Auto)  0.6    (0.0-0.8)  K/uL


 


Eos # (Auto)  0.0    (0.0-0.7)  K/uL


 


Baso # (Auto)  0.0    (0.0-0.1)  K/uL


 


Nucleated RBC %  0.0    /100WBC


 


Nucleated RBCs #  0    K/uL


 


VBG pH     (7.31-7.41)  


 


VBG pCO2     (35-45)  mmHG


 


VBG pO2     (30-40)  mmHG


 


VBG HCO3     (22-30)  mEq/L


 


VBG Total CO2     (41-51)  mmol/L


 


VBG Base Excess     (-3.0-3.0)  


 


Lactate   1.3   (0.20-2.00)  mmol/L


 


Sodium    136  (136-145)  mmol/L


 


Potassium    2.6 L  (3.5-5.1)  mmol/L


 


Chloride    96 L  ()  mmol/L


 


Carbon Dioxide    29.3  (21.0-32.0)  mmol/L


 


BUN    13  (7.0-18.0)  mg/dL


 


Creatinine    0.7  (0.6-1.0)  mg/dL


 


Est Cr Clr Drug Dosing    90.53  mL/min


 


Estimated GFR (MDRD)    > 60.0  ml/min


 


Glucose    79  ()  mg/dL


 


Calcium    9.2  (8.5-10.1)  mg/dL


 


Phosphorus    3.2  (2.6-4.7)  mg/dL


 


Magnesium    1.5 L  (1.8-2.4)  mg/dL


 


Total Bilirubin    1.5 H  (0.2-1.0)  mg/dL


 


AST    32  (15-37)  IU/L


 


ALT    17  (14-63)  IU/L


 


Alkaline Phosphatase    135 H  ()  U/L


 


Total Protein    7.5  (6.4-8.2)  g/dL


 


Albumin    3.5  (3.4-5.0)  g/dL


 


Globulin    4.0  (2.6-4.0)  g/dL


 


Albumin/Globulin Ratio    0.9  (0.9-1.6)  


 


Ethyl Alcohol    < 3.0  mg/dL


 


SARS-CoV-2 RNA (RODRIGO)     (NEGATIVE)  














  10/26/20 10/26/20 Range/Units





  17:04 17:10 


 


WBC    (4.0-11.0)  K/uL


 


RBC    (4.30-5.90)  M/uL


 


Hgb    (12.0-16.0)  g/dL


 


Hct    (36.0-46.0)  %


 


MCV    (80.0-98.0)  fL


 


MCH    (27.0-32.0)  pg


 


MCHC    (31.0-37.0)  g/dL


 


RDW Std Deviation    (28.0-62.0)  fl


 


RDW Coeff of Jason    (11.0-15.0)  %


 


Plt Count    (150-400)  K/uL


 


MPV    (7.40-12.00)  fL


 


Neut % (Auto)    (48.0-80.0)  %


 


Lymph % (Auto)    (16.0-40.0)  %


 


Mono % (Auto)    (0.0-15.0)  %


 


Eos % (Auto)    (0.0-7.0)  %


 


Baso % (Auto)    (0.0-1.5)  %


 


Neut # (Auto)    (1.4-5.7)  K/uL


 


Lymph # (Auto)    (0.6-2.4)  K/uL


 


Mono # (Auto)    (0.0-0.8)  K/uL


 


Eos # (Auto)    (0.0-0.7)  K/uL


 


Baso # (Auto)    (0.0-0.1)  K/uL


 


Nucleated RBC %    /100WBC


 


Nucleated RBCs #    K/uL


 


VBG pH  7.37   (7.31-7.41)  


 


VBG pCO2  55 H   (35-45)  mmHG


 


VBG pO2  24 L   (30-40)  mmHG


 


VBG HCO3  32 H   (22-30)  mEq/L


 


VBG Total CO2  30 L   (41-51)  mmol/L


 


VBG Base Excess  5.2 H   (-3.0-3.0)  


 


Lactate    (0.20-2.00)  mmol/L


 


Sodium    (136-145)  mmol/L


 


Potassium    (3.5-5.1)  mmol/L


 


Chloride    ()  mmol/L


 


Carbon Dioxide    (21.0-32.0)  mmol/L


 


BUN    (7.0-18.0)  mg/dL


 


Creatinine    (0.6-1.0)  mg/dL


 


Est Cr Clr Drug Dosing    mL/min


 


Estimated GFR (MDRD)    ml/min


 


Glucose    ()  mg/dL


 


Calcium    (8.5-10.1)  mg/dL


 


Phosphorus    (2.6-4.7)  mg/dL


 


Magnesium    (1.8-2.4)  mg/dL


 


Total Bilirubin    (0.2-1.0)  mg/dL


 


AST    (15-37)  IU/L


 


ALT    (14-63)  IU/L


 


Alkaline Phosphatase    ()  U/L


 


Total Protein    (6.4-8.2)  g/dL


 


Albumin    (3.4-5.0)  g/dL


 


Globulin    (2.6-4.0)  g/dL


 


Albumin/Globulin Ratio    (0.9-1.6)  


 


Ethyl Alcohol    mg/dL


 


SARS-CoV-2 RNA (RODRIGO)   NEGATIVE  (NEGATIVE)  











Meds: 


Medications











Generic Name Dose Route Start Last Admin





  Trade Name Freq  PRN Reason Stop Dose Admin


 


Potassium Chloride 40 meq/  100 mls @ 25 mls/hr  10/26/20 17:51  10/26/20 19:00





  Premix  IV  10/26/20 21:50  25 mls/hr





  ONETIME ONE   Administration


 


Sodium Chloride  10 ml  10/26/20 17:03 





  Saline Flush  FLUSH  





  ASDIRECTED PRN  





  Keep Vein Open  


 


Sodium Chloride  2.5 ml  10/26/20 17:03 





  Saline Flush  FLUSH  





  ASDIRECTED PRN  





  Keep Vein Open  














Discontinued Medications














Generic Name Dose Route Start Last Admin





  Trade Name Freq  PRN Reason Stop Dose Admin


 


Magnesium Sulfate  2 gm in 50 mls @ 50 mls/hr  10/26/20 17:54  10/26/20 18:59





  Magnesium Sulfate In Water Premix  IV  10/26/20 18:53  50 mls/hr





  STAT STA   Administration


 


Potassium Chloride  40 meq  10/26/20 17:49  10/26/20 19:03





  Klor-Con M20  PO  10/26/20 17:50  40 meq





  ONETIME ONE   Administration














Departure





- Departure


Time of Disposition: 19:16


Disposition: Admitted As Inpatient 64


Clinical Impression: 


 AMS (altered mental status), Hypoxia








- Discharge Information


*PRESCRIPTION DRUG MONITORING PROGRAM REVIEWED*: Not Applicable


*COPY OF PRESCRIPTION DRUG MONITORING REPORT IN PATIENT SHERLY: Not Applicable


Referrals: 


Quinton Lorenzo MD [Primary Care Provider] - 


Forms:  ED Department Discharge





Critical Care Note





- Critical Care Note


Total Time (mins): 40


Comments: 





Critical Care Procedure Note


Authorized and Performed by: Dr. Wolfe


Total critical care time: Approximately  





Due to a high probability of clinically significant, life threatening 

deterioration, the patient required my highest level of preparedness to 

intervene emergently and I personally spent this critical care time directly and

personally managing the patient. This critical care time included obtaining a 

history; examining the patient; pulse oximetry; ordering and review of studies; 

arranging urgent treatment with development of a management plan; evaluation of 

patient's response to treatment; frequent reassessment; and, discussions with 

other providers.


This critical care time was performed to assess and manage the high probability 

of imminent, life-threatening deterioration that could result in multi-organ 

failure. It was exclusive of separately billable procedures and treating other 

patients and teaching time.





Sepsis Event Note (ED)





- Evaluation


Sepsis Screening Result: No Definite Risk





- Focused Exam


Vital Signs: 


                                   Vital Signs











  Temp Pulse Resp BP Pulse Ox


 


 10/26/20 16:53  96.5 F L  86  18  136/53 L  70 L














- My Orders


Last 24 Hours: 


My Active Orders





10/26/20 17:03


Pulse Oximetry [RC] ASDIRECTED 


Sodium Chloride 0.9% [Saline Flush]   10 ml FLUSH ASDIRECTED PRN 


Sodium Chloride 0.9% [Saline Flush]   2.5 ml FLUSH ASDIRECTED PRN 


Saline Lock Insert [OM.PC] Stat 





10/26/20 17:04


BLOOD GAS ARTERIAL [BG] Stat 


CULTURE BLOOD [BC] Stat 


UA W/GABRIEL RFLX IF INDICATED [URIN] Stat 


Blood Culture x2 Reflex Set [OM.PC] Stat 





10/26/20 17:10


CULTURE BLOOD [BC] Stat 





10/26/20 17:15


EKG 12 Lead [EKG Documentation Completion] [RC] STAT 





10/26/20 17:51


Potassium Chloride Riders [KCL 40 MEQ in Water 100 ML] 40 meq   Premix Bag 1 bag

IV ONETIME 





10/26/20 18:50


Head wo Cont [CT] Stat 





10/26/20 19:08


Patient Status [ADT] Routine 














- Assessment/Plan


Last 24 Hours: 


My Active Orders





10/26/20 17:03


Pulse Oximetry [RC] ASDIRECTED 


Sodium Chloride 0.9% [Saline Flush]   10 ml FLUSH ASDIRECTED PRN 


Sodium Chloride 0.9% [Saline Flush]   2.5 ml FLUSH ASDIRECTED PRN 


Saline Lock Insert [OM.PC] Stat 





10/26/20 17:04


BLOOD GAS ARTERIAL [BG] Stat 


CULTURE BLOOD [BC] Stat 


UA W/GABRIEL RFLX IF INDICATED [URIN] Stat 


Blood Culture x2 Reflex Set [OM.PC] Stat 





10/26/20 17:10


CULTURE BLOOD [BC] Stat 





10/26/20 17:15


EKG 12 Lead [EKG Documentation Completion] [RC] STAT 





10/26/20 17:51


Potassium Chloride Riders [KCL 40 MEQ in Water 100 ML] 40 meq   Premix Bag 1 bag

IV ONETIME 





10/26/20 18:50


Head wo Cont [CT] Stat 





10/26/20 19:08


Patient Status [ADT] Routine 











Plan: 





Patient is a 54-year-old female who presents today for AMS.  Patient was satting

 70% on room air and was placed on nasal cannula now satting 100%. 


 Will obtain CT head lab UA and reassess.








Patient labs reviewed patient PCO2 55 but has been elevated to 53 in past.  

Patient CT head is negative as well.  Patient UA still pending if possible give 

antibiotics.  Will admit patient to the hospital for altered mental status.  

Patient is on 4 L currently 100% Covid is negative x-ray clear as well no 

pneumonia.  No white count.

## 2020-10-27 NOTE — PCM.PN
<Sandro Shore - Last Filed: 10/27/20 16:37>





- General Info


Date of Service: 10/27/20


Subjective Update: 





Reports feeling fatigued this morning. She is AO x3. Complains of pain on 

urination. 





- Patient Data


Vitals - Most Recent: 


                                Last Vital Signs











Temp  36.9 C   10/27/20 13:00


 


Pulse  85   10/27/20 13:00


 


Resp  16   10/27/20 13:00


 


BP  99/65   10/27/20 13:00


 


Pulse Ox  92 L  10/27/20 13:00











Weight - Most Recent: 76.612 kg


I&O - Last 24 Hours: 


                                 Intake & Output











 10/27/20 10/27/20 10/27/20





 06:59 14:59 22:59


 


Intake Total 350  290


 


Output Total 400  400


 


Balance -50  -110











Lab Results Last 24 Hours: 


                         Laboratory Results - last 24 hr











  10/26/20 10/26/20 10/26/20 Range/Units





  17:04 17:04 17:04 


 


WBC  6.30    (4.0-11.0)  K/uL


 


RBC  4.69    (4.30-5.90)  M/uL


 


Hgb  11.7 L    (12.0-16.0)  g/dL


 


Hct  39.4    (36.0-46.0)  %


 


MCV  84.0    (80.0-98.0)  fL


 


MCH  24.9 L    (27.0-32.0)  pg


 


MCHC  29.7 L    (31.0-37.0)  g/dL


 


RDW Std Deviation  57.2    (28.0-62.0)  fl


 


RDW Coeff of Jason  19 H    (11.0-15.0)  %


 


Plt Count  142 L    (150-400)  K/uL


 


MPV  9.90    (7.40-12.00)  fL


 


Neut % (Auto)  65.9    (48.0-80.0)  %


 


Lymph % (Auto)  23.5    (16.0-40.0)  %


 


Mono % (Auto)  9.8    (0.0-15.0)  %


 


Eos % (Auto)  0.6    (0.0-7.0)  %


 


Baso % (Auto)  0.2    (0.0-1.5)  %


 


Neut # (Auto)  4.2    (1.4-5.7)  K/uL


 


Lymph # (Auto)  1.5    (0.6-2.4)  K/uL


 


Mono # (Auto)  0.6    (0.0-0.8)  K/uL


 


Eos # (Auto)  0.0    (0.0-0.7)  K/uL


 


Baso # (Auto)  0.0    (0.0-0.1)  K/uL


 


Nucleated RBC %  0.0    /100WBC


 


Nucleated RBCs #  0    K/uL


 


VBG pH     (7.31-7.41)  


 


VBG pCO2     (35-45)  mmHG


 


VBG pO2     (30-40)  mmHG


 


VBG HCO3     (22-30)  mEq/L


 


VBG Total CO2     (41-51)  mmol/L


 


VBG Base Excess     (-3.0-3.0)  


 


Lactate   1.3   (0.20-2.00)  mmol/L


 


Sodium    136  (136-145)  mmol/L


 


Potassium    2.6 L  (3.5-5.1)  mmol/L


 


Chloride    96 L  ()  mmol/L


 


Carbon Dioxide    29.3  (21.0-32.0)  mmol/L


 


BUN    13  (7.0-18.0)  mg/dL


 


Creatinine    0.7  (0.6-1.0)  mg/dL


 


Est Cr Clr Drug Dosing    90.53  mL/min


 


Estimated GFR (MDRD)    > 60.0  ml/min


 


Glucose    79  ()  mg/dL


 


POC Glucose     ()  mg/dL


 


Calcium    9.2  (8.5-10.1)  mg/dL


 


Phosphorus    3.2  (2.6-4.7)  mg/dL


 


Magnesium    1.5 L  (1.8-2.4)  mg/dL


 


Total Bilirubin    1.5 H  (0.2-1.0)  mg/dL


 


AST    32  (15-37)  IU/L


 


ALT    17  (14-63)  IU/L


 


Alkaline Phosphatase    135 H  ()  U/L


 


Total Protein    7.5  (6.4-8.2)  g/dL


 


Albumin    3.5  (3.4-5.0)  g/dL


 


Globulin    4.0  (2.6-4.0)  g/dL


 


Albumin/Globulin Ratio    0.9  (0.9-1.6)  


 


Urine Color     


 


Urine Appearance     


 


Urine pH     (5.0-8.0)  


 


Ur Specific Gravity     (1.001-1.035)  


 


Urine Protein     (NEGATIVE)  mg/dL


 


Urine Glucose (UA)     (NEGATIVE)  mg/dL


 


Urine Ketones     (NEGATIVE)  mg/dL


 


Urine Occult Blood     (NEGATIVE)  


 


Urine Nitrite     (NEGATIVE)  


 


Urine Bilirubin     (NEGATIVE)  


 


Urine Ictotest     


 


Urine Urobilinogen     (<2.0)  EU/dL


 


Ur Leukocyte Esterase     (NEGATIVE)  


 


Urine RBC     (0-2/HPF)  


 


Urine WBC     (0-5/HPF)  


 


Ur Epithelial Cells     (NONE-FEW)  


 


Urine Bacteria     (NEGATIVE)  


 


Urine Mucus     (NONE-MOD)  


 


Urine Opiates Screen     (NEGATIVE)  


 


Ur Oxycodone Screen     (NEGATIVE)  


 


Urine Methadone Screen     (NEGATIVE)  


 


Ur Barbiturates Screen     (NEGATIVE)  


 


Ur Phencyclidine Scrn     (NEGATIVE)  


 


Ur Amphetamine Screen     (NEGATIVE)  


 


U Methamphetamines Scrn     (NEGATIVE)  


 


U Benzodiazepines Scrn     (NEGATIVE)  


 


U Cocaine Metab Screen     (NEGATIVE)  


 


U Marijuana (THC) Screen     (NEGATIVE)  


 


Ethyl Alcohol    < 3.0  mg/dL


 


SARS-CoV-2 RNA (RODRIGO)     (NEGATIVE)  














  10/26/20 10/26/20 10/26/20 Range/Units





  17:04 17:10 19:15 


 


WBC     (4.0-11.0)  K/uL


 


RBC     (4.30-5.90)  M/uL


 


Hgb     (12.0-16.0)  g/dL


 


Hct     (36.0-46.0)  %


 


MCV     (80.0-98.0)  fL


 


MCH     (27.0-32.0)  pg


 


MCHC     (31.0-37.0)  g/dL


 


RDW Std Deviation     (28.0-62.0)  fl


 


RDW Coeff of Jason     (11.0-15.0)  %


 


Plt Count     (150-400)  K/uL


 


MPV     (7.40-12.00)  fL


 


Neut % (Auto)     (48.0-80.0)  %


 


Lymph % (Auto)     (16.0-40.0)  %


 


Mono % (Auto)     (0.0-15.0)  %


 


Eos % (Auto)     (0.0-7.0)  %


 


Baso % (Auto)     (0.0-1.5)  %


 


Neut # (Auto)     (1.4-5.7)  K/uL


 


Lymph # (Auto)     (0.6-2.4)  K/uL


 


Mono # (Auto)     (0.0-0.8)  K/uL


 


Eos # (Auto)     (0.0-0.7)  K/uL


 


Baso # (Auto)     (0.0-0.1)  K/uL


 


Nucleated RBC %     /100WBC


 


Nucleated RBCs #     K/uL


 


VBG pH  7.37    (7.31-7.41)  


 


VBG pCO2  55 H    (35-45)  mmHG


 


VBG pO2  24 L    (30-40)  mmHG


 


VBG HCO3  32 H    (22-30)  mEq/L


 


VBG Total CO2  30 L    (41-51)  mmol/L


 


VBG Base Excess  5.2 H    (-3.0-3.0)  


 


Lactate     (0.20-2.00)  mmol/L


 


Sodium     (136-145)  mmol/L


 


Potassium     (3.5-5.1)  mmol/L


 


Chloride     ()  mmol/L


 


Carbon Dioxide     (21.0-32.0)  mmol/L


 


BUN     (7.0-18.0)  mg/dL


 


Creatinine     (0.6-1.0)  mg/dL


 


Est Cr Clr Drug Dosing     mL/min


 


Estimated GFR (MDRD)     ml/min


 


Glucose     ()  mg/dL


 


POC Glucose     ()  mg/dL


 


Calcium     (8.5-10.1)  mg/dL


 


Phosphorus     (2.6-4.7)  mg/dL


 


Magnesium     (1.8-2.4)  mg/dL


 


Total Bilirubin     (0.2-1.0)  mg/dL


 


AST     (15-37)  IU/L


 


ALT     (14-63)  IU/L


 


Alkaline Phosphatase     ()  U/L


 


Total Protein     (6.4-8.2)  g/dL


 


Albumin     (3.4-5.0)  g/dL


 


Globulin     (2.6-4.0)  g/dL


 


Albumin/Globulin Ratio     (0.9-1.6)  


 


Urine Color    YELLOW  


 


Urine Appearance    CLEAR  


 


Urine pH    6.0  (5.0-8.0)  


 


Ur Specific Gravity    >= 1.030  (1.001-1.035)  


 


Urine Protein    TRACE H  (NEGATIVE)  mg/dL


 


Urine Glucose (UA)    NEGATIVE  (NEGATIVE)  mg/dL


 


Urine Ketones    >=80  (NEGATIVE)  mg/dL


 


Urine Occult Blood    NEGATIVE  (NEGATIVE)  


 


Urine Nitrite    POSITIVE H  (NEGATIVE)  


 


Urine Bilirubin    MODERATE H  (NEGATIVE)  


 


Urine Ictotest    NEGATIVE  


 


Urine Urobilinogen    1.0  (<2.0)  EU/dL


 


Ur Leukocyte Esterase    TRACE H  (NEGATIVE)  


 


Urine RBC    0-2  (0-2/HPF)  


 


Urine WBC    15-20  (0-5/HPF)  


 


Ur Epithelial Cells    FEW  (NONE-FEW)  


 


Urine Bacteria    2+ H  (NEGATIVE)  


 


Urine Mucus    LIGHT  (NONE-MOD)  


 


Urine Opiates Screen     (NEGATIVE)  


 


Ur Oxycodone Screen     (NEGATIVE)  


 


Urine Methadone Screen     (NEGATIVE)  


 


Ur Barbiturates Screen     (NEGATIVE)  


 


Ur Phencyclidine Scrn     (NEGATIVE)  


 


Ur Amphetamine Screen     (NEGATIVE)  


 


U Methamphetamines Scrn     (NEGATIVE)  


 


U Benzodiazepines Scrn     (NEGATIVE)  


 


U Cocaine Metab Screen     (NEGATIVE)  


 


U Marijuana (THC) Screen     (NEGATIVE)  


 


Ethyl Alcohol     mg/dL


 


SARS-CoV-2 RNA (RODRIGO)   NEGATIVE   (NEGATIVE)  














  10/27/20 10/27/20 10/27/20 Range/Units





  01:12 05:59 05:59 


 


WBC   5.69   (4.0-11.0)  K/uL


 


RBC   4.26 L   (4.30-5.90)  M/uL


 


Hgb   10.5 L   (12.0-16.0)  g/dL


 


Hct   36.1   (36.0-46.0)  %


 


MCV   84.7   (80.0-98.0)  fL


 


MCH   24.6 L   (27.0-32.0)  pg


 


MCHC   29.1 L   (31.0-37.0)  g/dL


 


RDW Std Deviation   57.9   (28.0-62.0)  fl


 


RDW Coeff of Jason   19 H   (11.0-15.0)  %


 


Plt Count   159   (150-400)  K/uL


 


MPV   10.70   (7.40-12.00)  fL


 


Neut % (Auto)   64.9   (48.0-80.0)  %


 


Lymph % (Auto)   23.0   (16.0-40.0)  %


 


Mono % (Auto)   10.7   (0.0-15.0)  %


 


Eos % (Auto)   1.2   (0.0-7.0)  %


 


Baso % (Auto)   0.2   (0.0-1.5)  %


 


Neut # (Auto)   3.7   (1.4-5.7)  K/uL


 


Lymph # (Auto)   1.3   (0.6-2.4)  K/uL


 


Mono # (Auto)   0.6   (0.0-0.8)  K/uL


 


Eos # (Auto)   0.1   (0.0-0.7)  K/uL


 


Baso # (Auto)   0.0   (0.0-0.1)  K/uL


 


Nucleated RBC %   0.0   /100WBC


 


Nucleated RBCs #   0   K/uL


 


VBG pH     (7.31-7.41)  


 


VBG pCO2     (35-45)  mmHG


 


VBG pO2     (30-40)  mmHG


 


VBG HCO3     (22-30)  mEq/L


 


VBG Total CO2     (41-51)  mmol/L


 


VBG Base Excess     (-3.0-3.0)  


 


Lactate     (0.20-2.00)  mmol/L


 


Sodium    140  (136-145)  mmol/L


 


Potassium    3.0 L  (3.5-5.1)  mmol/L


 


Chloride    101  ()  mmol/L


 


Carbon Dioxide    27.2  (21.0-32.0)  mmol/L


 


BUN    8  (7.0-18.0)  mg/dL


 


Creatinine    0.6  (0.6-1.0)  mg/dL


 


Est Cr Clr Drug Dosing    101.81  mL/min


 


Estimated GFR (MDRD)    > 60.0  ml/min


 


Glucose    63 L  ()  mg/dL


 


POC Glucose     ()  mg/dL


 


Calcium    8.8  (8.5-10.1)  mg/dL


 


Phosphorus    3.5  (2.6-4.7)  mg/dL


 


Magnesium    1.8  (1.8-2.4)  mg/dL


 


Total Bilirubin     (0.2-1.0)  mg/dL


 


AST     (15-37)  IU/L


 


ALT     (14-63)  IU/L


 


Alkaline Phosphatase     ()  U/L


 


Total Protein     (6.4-8.2)  g/dL


 


Albumin     (3.4-5.0)  g/dL


 


Globulin     (2.6-4.0)  g/dL


 


Albumin/Globulin Ratio     (0.9-1.6)  


 


Urine Color     


 


Urine Appearance     


 


Urine pH     (5.0-8.0)  


 


Ur Specific Gravity     (1.001-1.035)  


 


Urine Protein     (NEGATIVE)  mg/dL


 


Urine Glucose (UA)     (NEGATIVE)  mg/dL


 


Urine Ketones     (NEGATIVE)  mg/dL


 


Urine Occult Blood     (NEGATIVE)  


 


Urine Nitrite     (NEGATIVE)  


 


Urine Bilirubin     (NEGATIVE)  


 


Urine Ictotest     


 


Urine Urobilinogen     (<2.0)  EU/dL


 


Ur Leukocyte Esterase     (NEGATIVE)  


 


Urine RBC     (0-2/HPF)  


 


Urine WBC     (0-5/HPF)  


 


Ur Epithelial Cells     (NONE-FEW)  


 


Urine Bacteria     (NEGATIVE)  


 


Urine Mucus     (NONE-MOD)  


 


Urine Opiates Screen  NEGATIVE    (NEGATIVE)  


 


Ur Oxycodone Screen  NEGATIVE    (NEGATIVE)  


 


Urine Methadone Screen  NEGATIVE    (NEGATIVE)  


 


Ur Barbiturates Screen  NEGATIVE    (NEGATIVE)  


 


Ur Phencyclidine Scrn  NEGATIVE    (NEGATIVE)  


 


Ur Amphetamine Screen  NEGATIVE    (NEGATIVE)  


 


U Methamphetamines Scrn  NEGATIVE    (NEGATIVE)  


 


U Benzodiazepines Scrn  NEGATIVE    (NEGATIVE)  


 


U Cocaine Metab Screen  NEGATIVE    (NEGATIVE)  


 


U Marijuana (THC) Screen  NEGATIVE    (NEGATIVE)  


 


Ethyl Alcohol     mg/dL


 


SARS-CoV-2 RNA (RODRIGO)     (NEGATIVE)  














  10/27/20 10/27/20 Range/Units





  10:08 11:53 


 


WBC    (4.0-11.0)  K/uL


 


RBC    (4.30-5.90)  M/uL


 


Hgb    (12.0-16.0)  g/dL


 


Hct    (36.0-46.0)  %


 


MCV    (80.0-98.0)  fL


 


MCH    (27.0-32.0)  pg


 


MCHC    (31.0-37.0)  g/dL


 


RDW Std Deviation    (28.0-62.0)  fl


 


RDW Coeff of Jason    (11.0-15.0)  %


 


Plt Count    (150-400)  K/uL


 


MPV    (7.40-12.00)  fL


 


Neut % (Auto)    (48.0-80.0)  %


 


Lymph % (Auto)    (16.0-40.0)  %


 


Mono % (Auto)    (0.0-15.0)  %


 


Eos % (Auto)    (0.0-7.0)  %


 


Baso % (Auto)    (0.0-1.5)  %


 


Neut # (Auto)    (1.4-5.7)  K/uL


 


Lymph # (Auto)    (0.6-2.4)  K/uL


 


Mono # (Auto)    (0.0-0.8)  K/uL


 


Eos # (Auto)    (0.0-0.7)  K/uL


 


Baso # (Auto)    (0.0-0.1)  K/uL


 


Nucleated RBC %    /100WBC


 


Nucleated RBCs #    K/uL


 


VBG pH    (7.31-7.41)  


 


VBG pCO2    (35-45)  mmHG


 


VBG pO2    (30-40)  mmHG


 


VBG HCO3    (22-30)  mEq/L


 


VBG Total CO2    (41-51)  mmol/L


 


VBG Base Excess    (-3.0-3.0)  


 


Lactate    (0.20-2.00)  mmol/L


 


Sodium    (136-145)  mmol/L


 


Potassium    (3.5-5.1)  mmol/L


 


Chloride    ()  mmol/L


 


Carbon Dioxide    (21.0-32.0)  mmol/L


 


BUN    (7.0-18.0)  mg/dL


 


Creatinine    (0.6-1.0)  mg/dL


 


Est Cr Clr Drug Dosing    mL/min


 


Estimated GFR (MDRD)    ml/min


 


Glucose    ()  mg/dL


 


POC Glucose  75  108  ()  mg/dL


 


Calcium    (8.5-10.1)  mg/dL


 


Phosphorus    (2.6-4.7)  mg/dL


 


Magnesium    (1.8-2.4)  mg/dL


 


Total Bilirubin    (0.2-1.0)  mg/dL


 


AST    (15-37)  IU/L


 


ALT    (14-63)  IU/L


 


Alkaline Phosphatase    ()  U/L


 


Total Protein    (6.4-8.2)  g/dL


 


Albumin    (3.4-5.0)  g/dL


 


Globulin    (2.6-4.0)  g/dL


 


Albumin/Globulin Ratio    (0.9-1.6)  


 


Urine Color    


 


Urine Appearance    


 


Urine pH    (5.0-8.0)  


 


Ur Specific Gravity    (1.001-1.035)  


 


Urine Protein    (NEGATIVE)  mg/dL


 


Urine Glucose (UA)    (NEGATIVE)  mg/dL


 


Urine Ketones    (NEGATIVE)  mg/dL


 


Urine Occult Blood    (NEGATIVE)  


 


Urine Nitrite    (NEGATIVE)  


 


Urine Bilirubin    (NEGATIVE)  


 


Urine Ictotest    


 


Urine Urobilinogen    (<2.0)  EU/dL


 


Ur Leukocyte Esterase    (NEGATIVE)  


 


Urine RBC    (0-2/HPF)  


 


Urine WBC    (0-5/HPF)  


 


Ur Epithelial Cells    (NONE-FEW)  


 


Urine Bacteria    (NEGATIVE)  


 


Urine Mucus    (NONE-MOD)  


 


Urine Opiates Screen    (NEGATIVE)  


 


Ur Oxycodone Screen    (NEGATIVE)  


 


Urine Methadone Screen    (NEGATIVE)  


 


Ur Barbiturates Screen    (NEGATIVE)  


 


Ur Phencyclidine Scrn    (NEGATIVE)  


 


Ur Amphetamine Screen    (NEGATIVE)  


 


U Methamphetamines Scrn    (NEGATIVE)  


 


U Benzodiazepines Scrn    (NEGATIVE)  


 


U Cocaine Metab Screen    (NEGATIVE)  


 


U Marijuana (THC) Screen    (NEGATIVE)  


 


Ethyl Alcohol    mg/dL


 


SARS-CoV-2 RNA (RODRIGO)    (NEGATIVE)  











Med Orders - Current: 


                               Current Medications





Albuterol/Ipratropium (Duoneb 3.0-0.5 Mg/3 Ml)  3 ml NEB Q4HRRT PRN


   PRN Reason: Shortness Of Breath/wheezing


Apixaban (Eliquis)  5 mg PO BID [facility]


   Last Admin: 10/27/20 08:42 Dose:  5 mg


   Documented by: 


Budesonide (Pulmicort)  0.5 mg INH BID [facility]


   Last Admin: 10/27/20 09:04 Dose:  0.5 mg


   Documented by: 


Clonazepam (Klonopin)  2 mg PO Q8H PRN


   PRN Reason: anxiety


   Last Admin: 10/27/20 11:56 Dose:  2 mg


   Documented by: 


Furosemide (Lasix)  40 mg PO DAILY [facility]


   Last Admin: 10/27/20 08:43 Dose:  40 mg


   Documented by: 


Ceftriaxone Sodium/Dextrose 1 (gm/ Premix)  50 mls @ 100 mls/hr IV Q24H [facility]


   Last Admin: 10/27/20 08:44 Dose:  100 mls/hr


   Documented by: 


Influenza Virus Vaccine (Afluria Quad 2020-21 (3yr Up))  60 mcg IM .ONCE ONE


   Stop: 10/28/20 09:01


Levothyroxine Sodium (Levothyroxine)  25 mcg PO DAILY [facility]


   Last Admin: 10/27/20 08:43 Dose:  25 mcg


   Documented by: 


Ondansetron HCl (Zofran)  4 mg IVPUSH Q4H PRN


   PRN Reason: Nausea/Vomiting


Anoro Ellipta 62.5- (25 Mcg)  0 each INH DAILY [facility]


Polysaccharide Iron Complex (Ferrex 150)  150 mg PO DAILY [facility]


   Last Admin: 10/27/20 08:43 Dose:  150 mg


   Documented by: 


Sodium Chloride (Saline Flush)  10 ml FLUSH ASDIRECTED PRN


   PRN Reason: Keep Vein Open


   Last Admin: 10/26/20 20:00 Dose:  10 ml


   Documented by: 


Sodium Chloride (Saline Flush)  2.5 ml FLUSH ASDIRECTED PRN


   PRN Reason: Keep Vein Open


   Last Admin: 10/26/20 20:00 Dose:  2.5 ml


   Documented by: 





Discontinued Medications





Potassium Chloride 40 meq/ (Premix)  100 mls @ 25 mls/hr IV ONETIME ONE


   Stop: 10/26/20 21:50


   Last Admin: 10/26/20 19:00 Dose:  25 mls/hr


   Documented by: 


Magnesium Sulfate (Magnesium Sulfate In Water Premix)  2 gm in 50 mls @ 50 

mls/hr IV STAT STA


   Stop: 10/26/20 18:53


   Last Admin: 10/26/20 18:59 Dose:  50 mls/hr


   Documented by: 


Ceftriaxone Sodium/Dextrose 1 (gm/ Premix)  50 mls @ 100 mls/hr IV ONETIME ONE


   Stop: 10/26/20 20:21


   Last Admin: 10/26/20 19:58 Dose:  100 mls/hr


   Documented by: 


Influenza Virus Vaccine (Pharmacy To Dose - Influenza Vaccine)  1 each IM 

ONETIME ONE


   Stop: 10/26/20 23:30


Lorazepam (Ativan)  1 mg IVPUSH Q4H PRN


   PRN Reason: Anxiety


Potassium Chloride (Klor-Con M20)  40 meq PO ONETIME ONE


   Stop: 10/26/20 17:50


   Last Admin: 10/26/20 19:03 Dose:  40 meq


   Documented by: 


Potassium Chloride (Klor-Con M20)  40 meq PO ONETIME ONE


   Stop: 10/27/20 07:41


   Last Admin: 10/27/20 08:42 Dose:  40 meq


   Documented by: 











- Exam


General: Alert, Oriented, Cooperative


Lungs: Clear to Auscultation, Normal Respiratory Effort


Cardiovascular: Regular Rate, Regular Rhythm


GI/Abdominal Exam: Normal Bowel Sounds, Soft, Non-Tender, No Distention


Extremities: Normal Inspection, No Pedal Edema





Sepsis Event Note





- Evaluation


Sepsis Screening Result: No Definite Risk





- Focused Exam


Vital Signs: 


                                   Vital Signs











  Temp Pulse Resp BP Pulse Ox


 


 10/27/20 13:00  36.9 C  85  16  99/65  92 L


 


 10/27/20 08:35  36.3 C  87  18  102/46 L  94 L


 


 10/27/20 05:00  36.6 C  82  20  118/62  95














- Problem List & Annotations


(1) AMS (altered mental status)


SNOMED Code(s): 112101664


   Code(s): R41.82 - ALTERED MENTAL STATUS, UNSPECIFIED   Status: Acute   

Current Visit: Yes   





(2) COPD (chronic obstructive pulmonary disease)


SNOMED Code(s): 34211469


   Code(s): J44.9 - CHRONIC OBSTRUCTIVE PULMONARY DISEASE, UNSPECIFIED   Status:

Acute   Current Visit: Yes   





(3) Hypokalemia


SNOMED Code(s): 27103985


   Code(s): E87.6 - HYPOKALEMIA   Status: Acute   Current Visit: Yes   





(4) Hypothyroidism


SNOMED Code(s): 71856127


   Code(s): E03.9 - HYPOTHYROIDISM, UNSPECIFIED   Status: Acute   Current Visit:

Yes   





(5) Anxiety


SNOMED Code(s): 86129185


   Code(s): F41.9 - ANXIETY DISORDER, UNSPECIFIED   Status: Acute   Current 

Visit: Yes   





(6) History of pulmonary embolus (PE)


SNOMED Code(s): 539674435


   Code(s): Z86.711 - PERSONAL HISTORY OF PULMONARY EMBOLISM   Status: Acute   

Current Visit: Yes   





(7) UTI (urinary tract infection)


SNOMED Code(s): 90629847


   Code(s): N39.0 - URINARY TRACT INFECTION, SITE NOT SPECIFIED   Status: Acute 

 Current Visit: Yes   





- Problem List Review


Problem List Initiated/Reviewed/Updated: Yes





- My Orders


Last 24 Hours: 


My Active Orders





10/27/20 10:45


Ang Chest [CT] Urgent 





10/27/20 12:00


ClonazePAM [KlonoPIN]   2 mg PO Q8H PRN 














- Plan


Plan:: 





Assessment and Plan:





1. AMS secondary to UTI vs hypoxic encephalopathy in a patient with suspected 

underlying COPD:


- Continue supplemental oxygen PRN to maintain O2 sat > 88% and continue home 

inhalers. Patient on IV ceftriaxone 1 g qd for UTI. Will also order CT angio to 

rule out PE.


- UA positive for esterase and nitrites. CT head was negative. Patient uses 

oxygen intermittently at home and reports possible history of COPD. Patient is 

non-compliant with oxygen at home. UDS was negative. CXR unremarkable.





2. Hypokalemia:


- Repleted with KCl 40 mEq PO x1. Will monitor.





3. Hyperbilirubinemia:


- Will monitor with CMP in AM.





4. DVT prophylaxis: SCD's.





5. Past medical history of pulmonary embolism, anxiety, depression and PTSD:


- Continue home medications.





 





<Akil Daugherty - Last Filed: 10/29/20 13:00>





- Patient Data


Vitals - Most Recent: 


                                Last Vital Signs











Temp  36.5 C   10/29/20 08:00


 


Pulse  73   10/29/20 08:00


 


Resp  18   10/29/20 08:00


 


BP  90/55 L  10/29/20 08:00


 


Pulse Ox  94 L  10/29/20 08:00











I&O - Last 24 Hours: 


                                 Intake & Output











 10/28/20 10/29/20 10/29/20





 22:59 06:59 14:59


 


Intake Total 800 500 


 


Output Total 1350  


 


Balance -550 500 











Lab Results Last 24 Hours: 


                         Laboratory Results - last 24 hr











  10/28/20 10/29/20 10/29/20 Range/Units





  14:10 05:07 05:07 


 


WBC   4.45   (4.0-11.0)  K/uL


 


RBC   3.87 L   (4.30-5.90)  M/uL


 


Hgb   9.5 L   (12.0-16.0)  g/dL


 


Hct   33.3 L   (36.0-46.0)  %


 


MCV   86.0   (80.0-98.0)  fL


 


MCH   24.5 L   (27.0-32.0)  pg


 


MCHC   28.5 L   (31.0-37.0)  g/dL


 


RDW Std Deviation   60.3   (28.0-62.0)  fl


 


RDW Coeff of Jason   19 H   (11.0-15.0)  %


 


Plt Count   155   (150-400)  K/uL


 


MPV   10.10   (7.40-12.00)  fL


 


Neut % (Auto)   53.3   (48.0-80.0)  %


 


Lymph % (Auto)   34.8   (16.0-40.0)  %


 


Mono % (Auto)   10.3   (0.0-15.0)  %


 


Eos % (Auto)   1.6   (0.0-7.0)  %


 


Baso % (Auto)   0.0   (0.0-1.5)  %


 


Neut # (Auto)   2.4   (1.4-5.7)  K/uL


 


Lymph # (Auto)   1.6   (0.6-2.4)  K/uL


 


Mono # (Auto)   0.5   (0.0-0.8)  K/uL


 


Eos # (Auto)   0.1   (0.0-0.7)  K/uL


 


Baso # (Auto)   0.0   (0.0-0.1)  K/uL


 


Nucleated RBC %   0.0   /100WBC


 


Nucleated RBCs #   0   K/uL


 


Sodium    143  (136-145)  mmol/L


 


Potassium  2.8 L   3.3 L  (3.5-5.1)  mmol/L


 


Chloride    103  ()  mmol/L


 


Carbon Dioxide    33.7 H  (21.0-32.0)  mmol/L


 


BUN    3 L  (7.0-18.0)  mg/dL


 


Creatinine    0.7  (0.6-1.0)  mg/dL


 


Est Cr Clr Drug Dosing    87.27  mL/min


 


Estimated GFR (MDRD)    > 60.0  ml/min


 


Glucose    84  ()  mg/dL


 


Calcium    8.8  (8.5-10.1)  mg/dL


 


Magnesium    1.6 L  (1.8-2.4)  mg/dL


 


Total Bilirubin    0.3  (0.2-1.0)  mg/dL


 


AST    17  (15-37)  IU/L


 


ALT    11 L  (14-63)  IU/L


 


Alkaline Phosphatase    86  ()  U/L


 


Total Protein    5.5 L  (6.4-8.2)  g/dL


 


Albumin    2.3 L  (3.4-5.0)  g/dL


 


Globulin    3.2  (2.6-4.0)  g/dL


 


Albumin/Globulin Ratio    0.7 L  (0.9-1.6)  











[facility] Results Last 24 Hours: 


                                  Microbiology











 10/26/20 17:10 Aerobic Blood Culture - Preliminary





 Blood - Venous - Lab Draw    NO GROWTH AFTER 2 DAYS





 Anaerobic Blood Culture - Preliminary





    NO GROWTH AFTER 2 DAYS


 


 10/26/20 17:04 Aerobic Blood Culture - Preliminary





 Blood - Venous    NO GROWTH AFTER 2 DAYS





 Anaerobic Blood Culture - Preliminary





    NO GROWTH AFTER 2 DAYS


 


 10/26/20 19:15 Urine Culture - Final





 Urine, Clean Catch    MIXED BRENNEN 1,000-10,000 CFU/ML











Med Orders - Current: 


                               Current Medications





Albuterol/Ipratropium (Duoneb 3.0-0.5 Mg/3 Ml)  3 ml NEB Q4HRRT PRN


   PRN Reason: Shortness Of Breath/wheezing


Apixaban (Eliquis)  5 mg PO BID [facility]


   Last Admin: 10/29/20 08:27 Dose:  5 mg


   Documented by: 


Budesonide (Pulmicort)  0.5 mg INH BID [facility]


   Last Admin: 10/29/20 08:30 Dose:  0.5 mg


   Documented by: 


Clonazepam (Klonopin)  2 mg PO Q8H PRN


   PRN Reason: anxiety


   Last Admin: 10/29/20 05:15 Dose:  2 mg


   Documented by: 


Furosemide (Lasix)  40 mg PO DAILY [facility]


   Last Admin: 10/29/20 08:28 Dose:  40 mg


   Documented by: 


Levothyroxine Sodium (Levothyroxine)  25 mcg PO DAILY [facility]


   Last Admin: 10/29/20 08:28 Dose:  25 mcg


   Documented by: 


Ondansetron HCl (Zofran)  4 mg IVPUSH Q4H PRN


   PRN Reason: Nausea/Vomiting


Anoro Ellipta 62.5- (25 Mcg)  0 each INH DAILY [facility]


   Last Admin: 10/29/20 08:29 Dose:  Not Given


   Documented by: 


Polysaccharide Iron Complex (Ferrex 150)  150 mg PO DAILY [facility]


   Last Admin: 10/29/20 08:28 Dose:  150 mg


   Documented by: 


Sodium Chloride (Saline Flush)  10 ml FLUSH ASDIRECTED PRN


   PRN Reason: Keep Vein Open


   Last Admin: 10/26/20 20:00 Dose:  10 ml


   Documented by: 


Sodium Chloride (Saline Flush)  2.5 ml FLUSH ASDIRECTED PRN


   PRN Reason: Keep Vein Open


   Last Admin: 10/26/20 20:00 Dose:  2.5 ml


   Documented by: 





Discontinued Medications





Potassium Chloride 40 meq/ (Premix)  100 mls @ 25 mls/hr IV ONETIME ONE


   Stop: 10/26/20 21:50


   Last Admin: 10/26/20 19:00 Dose:  25 mls/hr


   Documented by: 


Magnesium Sulfate (Magnesium Sulfate In Water Premix)  2 gm in 50 mls @ 50 

mls/hr IV STAT STA


   Stop: 10/26/20 18:53


   Last Admin: 10/26/20 18:59 Dose:  50 mls/hr


   Documented by: 


Ceftriaxone Sodium/Dextrose 1 (gm/ Premix)  50 mls @ 100 mls/hr IV ONETIME ONE


   Stop: 10/26/20 20:21


   Last Admin: 10/26/20 19:58 Dose:  100 mls/hr


   Documented by: 


Ceftriaxone Sodium/Dextrose 1 (gm/ Premix)  50 mls @ 100 mls/hr IV Q24H [facility]


   Last Admin: 10/28/20 12:01 Dose:  100 mls/hr


   Documented by: 


Potassium Chloride/Sodium Chloride (Normal Saline With 40 Meq Kcl)  1,000 mls @ 

150 mls/hr IV ASDIRECTED [facility] [facility]


   Stop: 10/28/20 14:24


Magnesium Sulfate 4 gm/ Premix  100 mls @ 50 mls/hr IV ONETIME ONE


   Stop: 10/28/20 10:14


   Last Admin: 10/28/20 09:01 Dose:  50 mls/hr


   Documented by: 


Potassium Chloride 60 meq/ (Sodium Chloride)  1,030 mls @ 125 mls/hr IV Q6H [place]


   Stop: 10/28/20 21:29


   Last Admin: 10/28/20 15:38 Dose:  125 mls/hr


   Documented by: 


Magnesium Sulfate (Magnesium Sulfate In Water Premix)  2 gm in 50 mls @ 50 

mls/hr IV ONETIME ONE


   Stop: 10/29/20 08:29


   Last Admin: 10/29/20 08:21 Dose:  50 mls/hr


   Documented by: 


Influenza Virus Vaccine (Pharmacy To Dose - Influenza Vaccine)  1 each IM 

ONETIME ONE


   Stop: 10/26/20 23:30


Influenza Virus Vaccine (Afluria Quad 2020-21 (3yr Up))  60 mcg IM .ONCE ONE


   Stop: 10/28/20 09:01


Influenza Virus Vaccine (Afluria Quad 2020-21 (3yr Up))  60 mcg IM .ONCE ONE


   Stop: 10/29/20 12:01


   Last Admin: 10/29/20 11:13 Dose:  60 mcg


   Documented by: 


Lorazepam (Ativan)  1 mg IVPUSH Q4H PRN


   PRN Reason: Anxiety


Potassium Chloride (Klor-Con M20)  40 meq PO ONETIME ONE


   Stop: 10/26/20 17:50


   Last Admin: 10/26/20 19:03 Dose:  40 meq


   Documented by: 


Potassium Chloride (Klor-Con M20)  40 meq PO ONETIME ONE


   Stop: 10/27/20 07:41


   Last Admin: 10/27/20 08:42 Dose:  40 meq


   Documented by: 


Potassium Chloride (Potassium Chloride)  40 meq PO ONETIME ONE


   Stop: 10/28/20 07:46


   Last Admin: 10/28/20 09:01 Dose:  40 meq


   Documented by: 


Potassium Chloride (Potassium Chloride)  40 meq PO ONETIME ONE


   Stop: 10/28/20 14:52


   Last Admin: 10/28/20 15:20 Dose:  Not Given


   Documented by: 


Potassium Chloride (Klor-Con M20)  20 meq PO DAILY [place]


Potassium Chloride (Klor-Con M20)  40 meq PO ONETIME ONE


   Stop: 10/29/20 07:20


   Last Admin: 10/29/20 08:20 Dose:  40 meq


   Documented by: 











Sepsis Event Note





- Focused Exam


Vital Signs: 


                                   Vital Signs











  Temp Pulse Resp BP BP Pulse Ox


 


 10/29/20 08:00  36.5 C  73  18  90/55 L  90/55 L  94 L


 


 10/29/20 04:09  36.8 C  80  16  96/55 L   95














- Problem List & Annotations


(1) AMS (altered mental status)


SNOMED Code(s): 822358625


   Code(s): R41.82 - ALTERED MENTAL STATUS, UNSPECIFIED   Status: Acute   

Current Visit: Yes   





(2) Acute respiratory failure with hypoxia


SNOMED Code(s): 54114433, 754950237


   Code(s): J96.01 - ACUTE RESPIRATORY FAILURE WITH HYPOXIA   Status: Acute   

Current Visit: No   





(3) UTI, Urinary tract infectious disease


SNOMED Code(s): 13177753


   Code(s): N39.0 - URINARY TRACT INFECTION, SITE NOT SPECIFIED   Status: Acute 

  Current Visit: No   





(4) COPD (chronic obstructive pulmonary disease)


SNOMED Code(s): 19384695


   Code(s): J44.9 - CHRONIC OBSTRUCTIVE PULMONARY DISEASE, UNSPECIFIED   Status:

 Acute   Current Visit: Yes   





(5) History of pulmonary embolus (PE)


SNOMED Code(s): 968960721


   Code(s): Z86.711 - PERSONAL HISTORY OF PULMONARY EMBOLISM   Status: Acute   

Current Visit: Yes   





(6) UTI (urinary tract infection)


SNOMED Code(s): 44548499


   Code(s): N39.0 - URINARY TRACT INFECTION, SITE NOT SPECIFIED   Status: Acute 

  Current Visit: Yes   





- Plan


Plan:: 














I have seen and evaluated the patient and agree with the residents note unless 

specified in my note

## 2020-10-27 NOTE — CT
INDICATION:



Altered mental status



TECHNIQUE:



Volumetric multi detector CT images of the head were obtained without the 

administration IV contrast. 



COMPARISON:



None. 



FINDINGS:



There is no intra-axial or extra-axial fluid collection.



There is no midline shift or mass effect.



There is minimal chronic small vessel disease change within the subcortical 

and periventricular white matter.



Otherwise, the brain parenchyma is normal in attenuation and gray-white 

differentiation.



The orbits and their contents are grossly within normal limits.



The paranasal sinuses are clear.



The mastoid air cells are clear.



The bony calvarium is grossly intact. 



IMPRESSION:



No acute intracranial abnormality.



Please note that all CT scans at this facility use dose modulation, 

iterative reconstruction, and/or weight-based dosing when appropriate to 

reduce radiation dose to as low as reasonably achievable.



Dictated by Leonard Pacheco MD @ Oct 26 2020  6:08PM



Signed by Dr. Leonard Pacheco @ Oct 26 2020  6:18PM

## 2020-10-28 NOTE — PCM.PN
<Sandro Shore - Last Filed: 10/28/20 13:59>





- General Info


Date of Service: 10/28/20


Subjective Update: 





No complaints at bedside this morning. AO x3. 





- Patient Data


Vitals - Most Recent: 


                                Last Vital Signs











Temp  37.0 C   10/28/20 11:00


 


Pulse  71   10/28/20 11:00


 


Resp  16   10/28/20 11:00


 


BP  95/53 L  10/28/20 11:00


 


Pulse Ox  92 L  10/28/20 11:00











Weight - Most Recent: 76.612 kg


I&O - Last 24 Hours: 


                                 Intake & Output











 10/27/20 10/28/20 10/28/20





 22:59 06:59 14:59


 


Intake Total 290 900 


 


Output Total 400 850 


 


Balance -110 50 











Lab Results Last 24 Hours: 


                         Laboratory Results - last 24 hr











  10/27/20 10/28/20 10/28/20 Range/Units





  16:48 05:21 05:21 


 


WBC   4.55   (4.0-11.0)  K/uL


 


RBC   4.13 L   (4.30-5.90)  M/uL


 


Hgb   10.1 L   (12.0-16.0)  g/dL


 


Hct   34.8 L   (36.0-46.0)  %


 


MCV   84.3   (80.0-98.0)  fL


 


MCH   24.5 L   (27.0-32.0)  pg


 


MCHC   29.0 L   (31.0-37.0)  g/dL


 


RDW Std Deviation   58.5   (28.0-62.0)  fl


 


RDW Coeff of Jason   19 H   (11.0-15.0)  %


 


Plt Count   159   (150-400)  K/uL


 


MPV   10.60   (7.40-12.00)  fL


 


Neut % (Auto)   51.5   (48.0-80.0)  %


 


Lymph % (Auto)   38.9   (16.0-40.0)  %


 


Mono % (Auto)   7.9   (0.0-15.0)  %


 


Eos % (Auto)   1.5   (0.0-7.0)  %


 


Baso % (Auto)   0.2   (0.0-1.5)  %


 


Neut # (Auto)   2.3   (1.4-5.7)  K/uL


 


Lymph # (Auto)   1.8   (0.6-2.4)  K/uL


 


Mono # (Auto)   0.4   (0.0-0.8)  K/uL


 


Eos # (Auto)   0.1   (0.0-0.7)  K/uL


 


Baso # (Auto)   0.0   (0.0-0.1)  K/uL


 


Nucleated RBC %   0.0   /100WBC


 


Nucleated RBCs #   0   K/uL


 


Sodium    140  (136-145)  mmol/L


 


Potassium    2.4 L*  (3.5-5.1)  mmol/L


 


Chloride    101  ()  mmol/L


 


Carbon Dioxide    33.0 H  (21.0-32.0)  mmol/L


 


BUN    5 L  (7.0-18.0)  mg/dL


 


Creatinine    0.7  (0.6-1.0)  mg/dL


 


Est Cr Clr Drug Dosing    87.27  mL/min


 


Estimated GFR (MDRD)    > 60.0  ml/min


 


Glucose    79  ()  mg/dL


 


POC Glucose  75    ()  mg/dL


 


Calcium    8.6  (8.5-10.1)  mg/dL


 


Magnesium     (1.8-2.4)  mg/dL


 


Total Bilirubin    0.6  (0.2-1.0)  mg/dL


 


AST    22  (15-37)  IU/L


 


ALT    16  (14-63)  IU/L


 


Alkaline Phosphatase    96  ()  U/L


 


Total Protein    5.8 L  (6.4-8.2)  g/dL


 


Albumin    2.6 L  (3.4-5.0)  g/dL


 


Globulin    3.2  (2.6-4.0)  g/dL


 


Albumin/Globulin Ratio    0.8 L  (0.9-1.6)  














  10/28/20 Range/Units





  05:21 


 


WBC   (4.0-11.0)  K/uL


 


RBC   (4.30-5.90)  M/uL


 


Hgb   (12.0-16.0)  g/dL


 


Hct   (36.0-46.0)  %


 


MCV   (80.0-98.0)  fL


 


MCH   (27.0-32.0)  pg


 


MCHC   (31.0-37.0)  g/dL


 


RDW Std Deviation   (28.0-62.0)  fl


 


RDW Coeff of Jason   (11.0-15.0)  %


 


Plt Count   (150-400)  K/uL


 


MPV   (7.40-12.00)  fL


 


Neut % (Auto)   (48.0-80.0)  %


 


Lymph % (Auto)   (16.0-40.0)  %


 


Mono % (Auto)   (0.0-15.0)  %


 


Eos % (Auto)   (0.0-7.0)  %


 


Baso % (Auto)   (0.0-1.5)  %


 


Neut # (Auto)   (1.4-5.7)  K/uL


 


Lymph # (Auto)   (0.6-2.4)  K/uL


 


Mono # (Auto)   (0.0-0.8)  K/uL


 


Eos # (Auto)   (0.0-0.7)  K/uL


 


Baso # (Auto)   (0.0-0.1)  K/uL


 


Nucleated RBC %   /100WBC


 


Nucleated RBCs #   K/uL


 


Sodium   (136-145)  mmol/L


 


Potassium   (3.5-5.1)  mmol/L


 


Chloride   ()  mmol/L


 


Carbon Dioxide   (21.0-32.0)  mmol/L


 


BUN   (7.0-18.0)  mg/dL


 


Creatinine   (0.6-1.0)  mg/dL


 


Est Cr Clr Drug Dosing   mL/min


 


Estimated GFR (MDRD)   ml/min


 


Glucose   ()  mg/dL


 


POC Glucose   ()  mg/dL


 


Calcium   (8.5-10.1)  mg/dL


 


Magnesium  1.4 L  (1.8-2.4)  mg/dL


 


Total Bilirubin   (0.2-1.0)  mg/dL


 


AST   (15-37)  IU/L


 


ALT   (14-63)  IU/L


 


Alkaline Phosphatase   ()  U/L


 


Total Protein   (6.4-8.2)  g/dL


 


Albumin   (3.4-5.0)  g/dL


 


Globulin   (2.6-4.0)  g/dL


 


Albumin/Globulin Ratio   (0.9-1.6)  











[facility] Results Last 24 Hours: 


                                  Microbiology











 10/26/20 19:15 Urine Culture - Final





 Urine, Clean Catch    MIXED MARGIE 1,000-10,000 CFU/ML


 


 10/26/20 17:10 Aerobic Blood Culture - Preliminary





 Blood - Venous - Lab Draw    NO GROWTH AFTER 1 DAY





 Anaerobic Blood Culture - Preliminary





    NO GROWTH AFTER 1 DAY


 


 10/26/20 17:04 Aerobic Blood Culture - Preliminary





 Blood - Venous    NO GROWTH AFTER 1 DAY





 Anaerobic Blood Culture - Preliminary





    NO GROWTH AFTER 1 DAY











Med Orders - Current: 


                               Current Medications





Albuterol/Ipratropium (Duoneb 3.0-0.5 Mg/3 Ml)  3 ml NEB Q4HRRT PRN


   PRN Reason: Shortness Of Breath/wheezing


Apixaban (Eliquis)  5 mg PO BID [facility]


   Last Admin: 10/28/20 09:01 Dose:  5 mg


   Documented by: 


Budesonide (Pulmicort)  0.5 mg INH BID [facility]


   Last Admin: 10/28/20 09:10 Dose:  0.5 mg


   Documented by: 


Clonazepam (Klonopin)  2 mg PO Q8H PRN


   PRN Reason: anxiety


   Last Admin: 10/28/20 12:06 Dose:  2 mg


   Documented by: 


Furosemide (Lasix)  40 mg PO DAILY [facility]


   Last Admin: 10/28/20 09:01 Dose:  40 mg


   Documented by: 


Ceftriaxone Sodium/Dextrose 1 (gm/ Premix)  50 mls @ 100 mls/hr IV Q24H [facility]


   Last Admin: 10/28/20 12:01 Dose:  100 mls/hr


   Documented by: 


Potassium Chloride/Sodium Chloride (Normal Saline With 40 Meq Kcl)  1,000 mls @ 

150 mls/hr IV ASDIRECTED [facility]


   Stop: 10/28/20 14:24


Levothyroxine Sodium (Levothyroxine)  25 mcg PO DAILY [facility]


   Last Admin: 10/28/20 09:01 Dose:  25 mcg


   Documented by: 


Ondansetron HCl (Zofran)  4 mg IVPUSH Q4H PRN


   PRN Reason: Nausea/Vomiting


Anoro Ellipta 62.5- (25 Mcg)  0 each INH DAILY [facility]


   Last Admin: 10/28/20 11:06 Dose:  Not Given


   Documented by: 


Polysaccharide Iron Complex (Ferrex 150)  150 mg PO DAILY [facility]


   Last Admin: 10/28/20 09:01 Dose:  150 mg


   Documented by: 


Sodium Chloride (Saline Flush)  10 ml FLUSH ASDIRECTED PRN


   PRN Reason: Keep Vein Open


   Last Admin: 10/26/20 20:00 Dose:  10 ml


   Documented by: 


Sodium Chloride (Saline Flush)  2.5 ml FLUSH ASDIRECTED PRN


   PRN Reason: Keep Vein Open


   Last Admin: 10/26/20 20:00 Dose:  2.5 ml


   Documented by: 





Discontinued Medications





Potassium Chloride 40 meq/ (Premix)  100 mls @ 25 mls/hr IV ONETIME ONE


   Stop: 10/26/20 21:50


   Last Admin: 10/26/20 19:00 Dose:  25 mls/hr


   Documented by: 


Magnesium Sulfate (Magnesium Sulfate In Water Premix)  2 gm in 50 mls @ 50 

mls/hr IV STAT ST[facility]


   Stop: 10/26/20 18:53


   Last Admin: 10/26/20 18:59 Dose:  50 mls/hr


   Documented by: 


Ceftriaxone Sodium/Dextrose 1 (gm/ Premix)  50 mls @ 100 mls/hr IV ONETIME ONE


   Stop: 10/26/20 20:21


   Last Admin: 10/26/20 19:58 Dose:  100 mls/hr


   Documented by: 


Magnesium Sulfate 4 gm/ Premix  100 mls @ 50 mls/hr IV ONETIME ONE


   Stop: 10/28/20 10:14


   Last Admin: 10/28/20 09:01 Dose:  50 mls/hr


   Documented by: 


Influenza Virus Vaccine (Pharmacy To Dose - Influenza Vaccine)  1 each IM 

ONETIME ONE


   Stop: 10/26/20 23:30


Influenza Virus Vaccine (Afluria Quad 2020-21 (3yr Up))  60 mcg IM .ONCE ONE


   Stop: 10/28/20 09:01


Lorazepam (Ativan)  1 mg IVPUSH Q4H PRN


   PRN Reason: Anxiety


Potassium Chloride (Klor-Con M20)  40 meq PO ONETIME ONE


   Stop: 10/26/20 17:50


   Last Admin: 10/26/20 19:03 Dose:  40 meq


   Documented by: 


Potassium Chloride (Klor-Con M20)  40 meq PO ONETIME ONE


   Stop: 10/27/20 07:41


   Last Admin: 10/27/20 08:42 Dose:  40 meq


   Documented by: 


Potassium Chloride (Potassium Chloride)  40 meq PO ONETIME ONE


   Stop: 10/28/20 07:46


   Last Admin: 10/28/20 09:01 Dose:  40 meq


   Documented by: 











- Exam


General: Alert, Oriented, Cooperative, No Acute Distress


Lungs: Clear to Auscultation, Normal Respiratory Effort


Cardiovascular: Regular Rate, Regular Rhythm


GI/Abdominal Exam: Normal Bowel Sounds, Soft, Non-Tender, No Distention


Extremities: Other (1+ edema b/l)


Skin: Warm, Dry, Intact





Sepsis Event Note





- Evaluation


Sepsis Screening Result: No Definite Risk





- Focused Exam


Vital Signs: 


                                   Vital Signs











  Temp Pulse Resp BP Pulse Ox


 


 10/28/20 11:00  37.0 C  71  16  95/53 L  92 L


 


 10/28/20 07:15  37.7 C  91  17  101/71  96


 


 10/28/20 04:40  37.1 C  85  16  93/52 L  93 L














- Problem List & Annotations


(1) AMS (altered mental status)


SNOMED Code(s): 132107250


   Code(s): R41.82 - ALTERED MENTAL STATUS, UNSPECIFIED   Status: Acute   

Current Visit: Yes   





(2) COPD (chronic obstructive pulmonary disease)


SNOMED Code(s): 88897460


   Code(s): J44.9 - CHRONIC OBSTRUCTIVE PULMONARY DISEASE, UNSPECIFIED   Status:

Acute   Current Visit: Yes   





(3) Hypokalemia


SNOMED Code(s): 71109838


   Code(s): E87.6 - HYPOKALEMIA   Status: Acute   Current Visit: Yes   





(4) Hypothyroidism


SNOMED Code(s): 39091368


   Code(s): E03.9 - HYPOTHYROIDISM, UNSPECIFIED   Status: Acute   Current Visit:

Yes   





(5) Anxiety


SNOMED Code(s): 45672173


   Code(s): F41.9 - ANXIETY DISORDER, UNSPECIFIED   Status: Acute   Current 

Visit: Yes   





(6) History of pulmonary embolus (PE)


SNOMED Code(s): 102962313


   Code(s): Z86.711 - PERSONAL HISTORY OF PULMONARY EMBOLISM   Status: Acute   

Current Visit: Yes   





(7) UTI (urinary tract infection)


SNOMED Code(s): 91445056


   Code(s): N39.0 - URINARY TRACT INFECTION, SITE NOT SPECIFIED   Status: Acute 

 Current Visit: Yes   





- Problem List Review


Problem List Initiated/Reviewed/Updated: Yes





- My Orders


Last 24 Hours: 


My Active Orders





10/27/20 16:56


Code Status [Resuscitation Status] Routine 





10/28/20 07:45


Sodium Chloride 0.9% with KCl [Normal Saline with 40 mEq KCl] 1,000 ml IV 

ASDIRECTED 





10/28/20 14:00


POTASSIUM,K [CHEM] Routine 














- Plan


Plan:: 





Assessment and Plan:





1. AMS secondary to UTI vs hypoxic encephalopathy in a patient with suspected 

underlying COPD:


- Patient's mentation significantly improved since yesterday. Continue 

supplemental oxygen PRN to maintain O2 sat > 88% and continue home inhalers. 

Will discontinue IV ceftriaxone as urine culture grew mixed margie. Will 

encourage ambulation today. Patient will require further follow-up with 

neurology as outpatient.


- UA positive for esterase and nitrites. Urine culture grew mixed margie. CT head

 was negative. Patient uses oxygen intermittently at home and reports possible 

history of COPD. Patient is non-compliant with oxygen at home. UDS was negative.

 CXR unremarkable.





2. Hypokalemia:


- Repleted with KCl 40 mEq PO x 1 and IV KCl 40 mEq. Also given IV magnesium 

sulfate 4 g as magnesium was found to be low. Will recheck potassium level this 

afternoon.





3. Hyperbilirubinemia, improved:


- Will monitor with CMP in AM.





4. DVT prophylaxis: SCD's.





5. Past medical history of pulmonary embolism, anxiety, depression and PTSD:


- Continue home medications.





 





<Akil Daugherty - Last Filed: 10/29/20 13:23>





- Patient Data


Vitals - Most Recent: 


                                Last Vital Signs











Temp  36.5 C   10/29/20 08:00


 


Pulse  73   10/29/20 08:00


 


Resp  18   10/29/20 08:00


 


BP  90/55 L  10/29/20 08:00


 


Pulse Ox  94 L  10/29/20 08:00











I&O - Last 24 Hours: 


                                 Intake & Output











 10/28/20 10/29/20 10/29/20





 22:59 06:59 14:59


 


Intake Total 800 500 


 


Output Total 1350  


 


Balance -550 500 











Lab Results Last 24 Hours: 


                         Laboratory Results - last 24 hr











  10/28/20 10/29/20 10/29/20 Range/Units





  14:10 05:07 05:07 


 


WBC   4.45   (4.0-11.0)  K/uL


 


RBC   3.87 L   (4.30-5.90)  M/uL


 


Hgb   9.5 L   (12.0-16.0)  g/dL


 


Hct   33.3 L   (36.0-46.0)  %


 


MCV   86.0   (80.0-98.0)  fL


 


MCH   24.5 L   (27.0-32.0)  pg


 


MCHC   28.5 L   (31.0-37.0)  g/dL


 


RDW Std Deviation   60.3   (28.0-62.0)  fl


 


RDW Coeff of Jason   19 H   (11.0-15.0)  %


 


Plt Count   155   (150-400)  K/uL


 


MPV   10.10   (7.40-12.00)  fL


 


Neut % (Auto)   53.3   (48.0-80.0)  %


 


Lymph % (Auto)   34.8   (16.0-40.0)  %


 


Mono % (Auto)   10.3   (0.0-15.0)  %


 


Eos % (Auto)   1.6   (0.0-7.0)  %


 


Baso % (Auto)   0.0   (0.0-1.5)  %


 


Neut # (Auto)   2.4   (1.4-5.7)  K/uL


 


Lymph # (Auto)   1.6   (0.6-2.4)  K/uL


 


Mono # (Auto)   0.5   (0.0-0.8)  K/uL


 


Eos # (Auto)   0.1   (0.0-0.7)  K/uL


 


Baso # (Auto)   0.0   (0.0-0.1)  K/uL


 


Nucleated RBC %   0.0   /100WBC


 


Nucleated RBCs #   0   K/uL


 


Sodium    143  (136-145)  mmol/L


 


Potassium  2.8 L   3.3 L  (3.5-5.1)  mmol/L


 


Chloride    103  ()  mmol/L


 


Carbon Dioxide    33.7 H  (21.0-32.0)  mmol/L


 


BUN    3 L  (7.0-18.0)  mg/dL


 


Creatinine    0.7  (0.6-1.0)  mg/dL


 


Est Cr Clr Drug Dosing    87.27  mL/min


 


Estimated GFR (MDRD)    > 60.0  ml/min


 


Glucose    84  ()  mg/dL


 


Calcium    8.8  (8.5-10.1)  mg/dL


 


Magnesium    1.6 L  (1.8-2.4)  mg/dL


 


Total Bilirubin    0.3  (0.2-1.0)  mg/dL


 


AST    17  (15-37)  IU/L


 


ALT    11 L  (14-63)  IU/L


 


Alkaline Phosphatase    86  ()  U/L


 


Total Protein    5.5 L  (6.4-8.2)  g/dL


 


Albumin    2.3 L  (3.4-5.0)  g/dL


 


Globulin    3.2  (2.6-4.0)  g/dL


 


Albumin/Globulin Ratio    0.7 L  (0.9-1.6)  











[facility] Results Last 24 Hours: 


                                  Microbiology











 10/26/20 17:10 Aerobic Blood Culture - Preliminary





 Blood - Venous - Lab Draw    NO GROWTH AFTER 2 DAYS





 Anaerobic Blood Culture - Preliminary





    NO GROWTH AFTER 2 DAYS


 


 10/26/20 17:04 Aerobic Blood Culture - Preliminary





 Blood - Venous    NO GROWTH AFTER 2 DAYS





 Anaerobic Blood Culture - Preliminary





    NO GROWTH AFTER 2 DAYS


 


 10/26/20 19:15 Urine Culture - Final





 Urine, Clean Catch    MIXED MARGEI 1,000-10,000 CFU/ML











Med Orders - Current: 


                               Current Medications





Albuterol/Ipratropium (Duoneb 3.0-0.5 Mg/3 Ml)  3 ml NEB Q4HRRT PRN


   PRN Reason: Shortness Of Breath/wheezing


Apixaban (Eliquis)  5 mg PO BID [facility]


   Last Admin: 10/29/20 08:27 Dose:  5 mg


   Documented by: 


Budesonide (Pulmicort)  0.5 mg INH BID [facility]


   Last Admin: 10/29/20 08:30 Dose:  0.5 mg


   Documented by: 


Clonazepam (Klonopin)  2 mg PO Q8H PRN


   PRN Reason: anxiety


   Last Admin: 10/29/20 05:15 Dose:  2 mg


   Documented by: 


Furosemide (Lasix)  40 mg PO DAILY [facility]


   Last Admin: 10/29/20 08:28 Dose:  40 mg


   Documented by: 


Levothyroxine Sodium (Levothyroxine)  25 mcg PO DAILY [facility]


   Last Admin: 10/29/20 08:28 Dose:  25 mcg


   Documented by: 


Ondansetron HCl (Zofran)  4 mg IVPUSH Q4H PRN


   PRN Reason: Nausea/Vomiting


Anoro Ellipta 62.5- (25 Mcg)  0 each INH DAILY [facility]


   Last Admin: 10/29/20 08:29 Dose:  Not Given


   Documented by: 


Polysaccharide Iron Complex (Ferrex 150)  150 mg PO DAILY [facility]


   Last Admin: 10/29/20 08:28 Dose:  150 mg


   Documented by: 


Sodium Chloride (Saline Flush)  10 ml FLUSH ASDIRECTED PRN


   PRN Reason: Keep Vein Open


   Last Admin: 10/26/20 20:00 Dose:  10 ml


   Documented by: 


Sodium Chloride (Saline Flush)  2.5 ml FLUSH ASDIRECTED PRN


   PRN Reason: Keep Vein Open


   Last Admin: 10/26/20 20:00 Dose:  2.5 ml


   Documented by: 





Discontinued Medications





Potassium Chloride 40 meq/ (Premix)  100 mls @ 25 mls/hr IV ONETIME ONE


   Stop: 10/26/20 21:50


   Last Admin: 10/26/20 19:00 Dose:  25 mls/hr


   Documented by: 


Magnesium Sulfate (Magnesium Sulfate In Water Premix)  2 gm in 50 mls @ 50 

mls/hr IV STAT STA


   Stop: 10/26/20 18:53


   Last Admin: 10/26/20 18:59 Dose:  50 mls/hr


   Documented by: 


Ceftriaxone Sodium/Dextrose 1 (gm/ Premix)  50 mls @ 100 mls/hr IV ONETIME ONE


   Stop: 10/26/20 20:21


   Last Admin: 10/26/20 19:58 Dose:  100 mls/hr


   Documented by: 


Ceftriaxone Sodium/Dextrose 1 (gm/ Premix)  50 mls @ 100 mls/hr IV Q24H [facility]


   Last Admin: 10/28/20 12:01 Dose:  100 mls/hr


   Documented by: 


Potassium Chloride/Sodium Chloride (Normal Saline With 40 Meq Kcl)  1,000 mls @ 

150 mls/hr IV ASDIRECTED [place]


   Stop: 10/28/20 14:24


Magnesium Sulfate 4 gm/ Premix  100 mls @ 50 mls/hr IV ONETIME ONE


   Stop: 10/28/20 10:14


   Last Admin: 10/28/20 09:01 Dose:  50 mls/hr


   Documented by: 


Potassium Chloride 60 meq/ (Sodium Chloride)  1,030 mls @ 125 mls/hr IV Q6H [facility]


   Stop: 10/28/20 21:29


   Last Admin: 10/28/20 15:38 Dose:  125 mls/hr


   Documented by: 


Magnesium Sulfate (Magnesium Sulfate In Water Premix)  2 gm in 50 mls @ 50 

mls/hr IV ONETIME ONE


   Stop: 10/29/20 08:29


   Last Admin: 10/29/20 08:21 Dose:  50 mls/hr


   Documented by: 


Influenza Virus Vaccine (Pharmacy To Dose - Influenza Vaccine)  1 each IM 

ONETIME ONE


   Stop: 10/26/20 23:30


Influenza Virus Vaccine (Afluria Quad 2020-21 (3yr Up))  60 mcg IM .ONCE ONE


   Stop: 10/28/20 09:01


Influenza Virus Vaccine (Afluria Quad 2020-21 (3yr Up))  60 mcg IM .ONCE ONE


   Stop: 10/29/20 12:01


   Last Admin: 10/29/20 11:13 Dose:  60 mcg


   Documented by: 


Lorazepam (Ativan)  1 mg IVPUSH Q4H PRN


   PRN Reason: Anxiety


Potassium Chloride (Klor-Con M20)  40 meq PO ONETIME ONE


   Stop: 10/26/20 17:50


   Last Admin: 10/26/20 19:03 Dose:  40 meq


   Documented by: 


Potassium Chloride (Klor-Con M20)  40 meq PO ONETIME ONE


   Stop: 10/27/20 07:41


   Last Admin: 10/27/20 08:42 Dose:  40 meq


   Documented by: 


Potassium Chloride (Potassium Chloride)  40 meq PO ONETIME ONE


   Stop: 10/28/20 07:46


   Last Admin: 10/28/20 09:01 Dose:  40 meq


   Documented by: 


Potassium Chloride (Potassium Chloride)  40 meq PO ONETIME ONE


   Stop: 10/28/20 14:52


   Last Admin: 10/28/20 15:20 Dose:  Not Given


   Documented by: 


Potassium Chloride (Klor-Con M20)  20 meq PO DAILY [place]


Potassium Chloride (Klor-Con M20)  40 meq PO ONETIME ONE


   Stop: 10/29/20 07:20


   Last Admin: 10/29/20 08:20 Dose:  40 meq


   Documented by: 











Sepsis Event Note





- Focused Exam


Vital Signs: 


                                   Vital Signs











  Temp Pulse Resp BP BP Pulse Ox


 


 10/29/20 08:00  36.5 C  73  18  90/55 L  90/55 L  94 L


 


 10/29/20 04:09  36.8 C  80  16  96/55 L   95














- Problem List & Annotations


(1) AMS (altered mental status)


SNOMED Code(s): 813744823


   Code(s): R41.82 - ALTERED MENTAL STATUS, UNSPECIFIED   Status: Acute   

Current Visit: Yes   





(2) Acute respiratory failure with hypoxia


SNOMED Code(s): 58925821, 327171383


   Code(s): J96.01 - ACUTE RESPIRATORY FAILURE WITH HYPOXIA   Status: Acute   

Current Visit: No   





(3) UTI, Urinary tract infectious disease


SNOMED Code(s): 21037847


   Code(s): N39.0 - URINARY TRACT INFECTION, SITE NOT SPECIFIED   Status: Acute 

  Current Visit: No   





(4) COPD (chronic obstructive pulmonary disease)


SNOMED Code(s): 78378758


   Code(s): J44.9 - CHRONIC OBSTRUCTIVE PULMONARY DISEASE, UNSPECIFIED   Status:

 Acute   Current Visit: Yes   





(5) History of pulmonary embolus (PE)


SNOMED Code(s): 595784023


   Code(s): Z86.711 - PERSONAL HISTORY OF PULMONARY EMBOLISM   Status: Acute   

Current Visit: Yes   





(6) UTI (urinary tract infection)


SNOMED Code(s): 24285133


   Code(s): N39.0 - URINARY TRACT INFECTION, SITE NOT SPECIFIED   Status: Acute 

  Current Visit: Yes   





- Plan


Plan:: 














I have seen and evaluated the patient and agree with the residents note unless 

specified in my note

## 2020-10-29 NOTE — [id number]
<Sandro Shore - Last Filed: 10/29/20 11:35>





**Discharge Summary





- Hospital Course


Free Text/Narrative:: 





54-year-old female admitted for AMS. She has a PMH of PE, anxiety, depression, 

PTSD and chronic hypoxia likely secondary to underlying COPD. On admission, UA 

was positive for esterase and nitrites and she was started on IV ceftriaxone. 

Urine culture grew mixed margie at which point ceftriaxone was discontinued. CT 

head was negative. Patient does not use oxygen consistently at home and was 

requiring 2 L NC throughout her hospital stay. Due to this, it is possible that 

chronic hypoxia could be factoring into her AMS. UDS was negative even though 

patient does take Klonopin at home, raising the question of medication non-

compliance and possible withdrawal contributing to her AMS as well. Patient's 

klonopin was resumed on admission and over the next 24 hours patient's mentation

was noted to be improved. Patient also had hypokalemia which was repleted with 

oral and IV potassium. Patient was able to ambulate around her room without 

difficulty prior to discharge. She was discharged in stable condition and given 

script for oral potassium. Advised to follow-up with PCP on discharge and will 

require recheck of her potassium levels. Also advised to continue to follow-up 

with neurology as an outpatient for further evaluation for possible underlying 

neurological etiology of AMS. Patient's  Tommy was updated on 

recommendations.





- Discharge Data


Discharge Date: 10/29/20


Discharge Disposition: Home, Self-Care 01


Condition: Stable





- Referral to Home Health


Primary Care Physician: 


Quinton Lorenzo MD








- Discharge Diagnosis/Problem(s)


(1) AMS (altered mental status)


SNOMED Code(s): 965991418


   ICD Code: R41.82 - ALTERED MENTAL STATUS, UNSPECIFIED   Status: Acute   

Current Visit: Yes   





(2) COPD (chronic obstructive pulmonary disease)


SNOMED Code(s): 05338746


   ICD Code: J44.9 - CHRONIC OBSTRUCTIVE PULMONARY DISEASE, UNSPECIFIED   Statu

s: Acute   Current Visit: Yes   





(3) Hypokalemia


SNOMED Code(s): 85252317


   ICD Code: E87.6 - HYPOKALEMIA   Status: Acute   Current Visit: Yes   





(4) Hypothyroidism


SNOMED Code(s): 12378172


   ICD Code: E03.9 - HYPOTHYROIDISM, UNSPECIFIED   Status: Acute   Current 

Visit: Yes   





(5) Anxiety


SNOMED Code(s): 17252657


   ICD Code: F41.9 - ANXIETY DISORDER, UNSPECIFIED   Status: Acute   Current 

Visit: Yes   





(6) History of pulmonary embolus (PE)


SNOMED Code(s): 576317180


   ICD Code: Z86.711 - PERSONAL HISTORY OF PULMONARY EMBOLISM   Status: Acute   

Current Visit: Yes   





(7) UTI (urinary tract infection)


SNOMED Code(s): 19813211


   ICD Code: N39.0 - URINARY TRACT INFECTION, SITE NOT SPECIFIED   Status: Acute

  Current Visit: Yes   





- Patient Instructions


Diet: Regular Diet as Tolerated


Activity: As Tolerated


Notify Provider of: Fever, Increased Pain, Swelling and Redness, Drainage, 

Nausea and/or Vomiting





- Discharge Plan


*PRESCRIPTION DRUG MONITORING PROGRAM REVIEWED*: Not Applicable


*COPY OF PRESCRIPTION DRUG MONITORING REPORT IN PATIENT SHERLY: Not Applicable


Prescriptions/Med Rec: 


Potassium Chloride 10 meq PO DAILY 30 Days #30 tablet.er


Home Medications: 


                                    Home Meds





ClonazePAM [KlonoPIN] 2 mg PO TID PRN 05/31/18 [History]


Umeclidinium Brm/Vilanterol Tr [Anoro Ellipta 62.5-25 MCG] 1 puff IH DAILY 

07/28/20 [History]


Iron Polysaccharides Complex [Ferrex 150] 150 mg PO DAILY 30 Days #30 cap 

07/29/20 [Rx]


Furosemide 40 mg PO DAILY 10/26/20 [History]


Levothyroxine 25 mcg PO DAILY 10/26/20 [History]


Apixaban [Eliquis] 5 mg PO BID  tablet 10/29/20 [Rx]


Potassium Chloride 10 meq PO DAILY 30 Days #30 tablet.er 10/29/20 [Rx]








Oxygen Therapy Mode: Nasal Cannula


Oxygen Flow Rate (L/min): 2


Patient Handouts:  Chronic Obstructive Pulmonary Disease Exacerbation, 

Easy-to-Read, Potassium chloride tablets, extended-release tablets or capsules, 

Confusion


Referrals: 


Abbey Guardado MD [Physician] - 11/17/20 11:30 am (Please arrive 15mins early.

 Bring insurance info, ID and own mask.)


Quinton Lorenzo MD [Primary Care Provider] - 11/06/20 12:30 pm





- Discharge Summary/Plan Comment


DC Time >30 min.: No





- Patient Data


Vitals - Most Recent: 


                                Last Vital Signs











Temp  36.5 C   10/29/20 08:00


 


Pulse  73   10/29/20 08:00


 


Resp  18   10/29/20 08:00


 


BP  90/55 L  10/29/20 08:00


 


Pulse Ox  94 L  10/29/20 08:00











Weight - Most Recent: 76.612 kg


I&O - Last 24 hours: 


                                 Intake & Output











 10/28/20 10/29/20 10/29/20





 22:59 06:59 14:59


 


Intake Total 800 500 


 


Output Total 1350  


 


Balance -550 500 











Lab Results - Last 24 hrs: 


                         Laboratory Results - last 24 hr











  10/28/20 10/29/20 10/29/20 Range/Units





  14:10 05:07 05:07 


 


WBC   4.45   (4.0-11.0)  K/uL


 


RBC   3.87 L   (4.30-5.90)  M/uL


 


Hgb   9.5 L   (12.0-16.0)  g/dL


 


Hct   33.3 L   (36.0-46.0)  %


 


MCV   86.0   (80.0-98.0)  fL


 


MCH   24.5 L   (27.0-32.0)  pg


 


MCHC   28.5 L   (31.0-37.0)  g/dL


 


RDW Std Deviation   60.3   (28.0-62.0)  fl


 


RDW Coeff of Jason   19 H   (11.0-15.0)  %


 


Plt Count   155   (150-400)  K/uL


 


MPV   10.10   (7.40-12.00)  fL


 


Neut % (Auto)   53.3   (48.0-80.0)  %


 


Lymph % (Auto)   34.8   (16.0-40.0)  %


 


Mono % (Auto)   10.3   (0.0-15.0)  %


 


Eos % (Auto)   1.6   (0.0-7.0)  %


 


Baso % (Auto)   0.0   (0.0-1.5)  %


 


Neut # (Auto)   2.4   (1.4-5.7)  K/uL


 


Lymph # (Auto)   1.6   (0.6-2.4)  K/uL


 


Mono # (Auto)   0.5   (0.0-0.8)  K/uL


 


Eos # (Auto)   0.1   (0.0-0.7)  K/uL


 


Baso # (Auto)   0.0   (0.0-0.1)  K/uL


 


Nucleated RBC %   0.0   /100WBC


 


Nucleated RBCs #   0   K/uL


 


Sodium    143  (136-145)  mmol/L


 


Potassium  2.8 L   3.3 L  (3.5-5.1)  mmol/L


 


Chloride    103  ()  mmol/L


 


Carbon Dioxide    33.7 H  (21.0-32.0)  mmol/L


 


BUN    3 L  (7.0-18.0)  mg/dL


 


Creatinine    0.7  (0.6-1.0)  mg/dL


 


Est Cr Clr Drug Dosing    87.27  mL/min


 


Estimated GFR (MDRD)    > 60.0  ml/min


 


Glucose    84  ()  mg/dL


 


Calcium    8.8  (8.5-10.1)  mg/dL


 


Magnesium    1.6 L  (1.8-2.4)  mg/dL


 


Total Bilirubin    0.3  (0.2-1.0)  mg/dL


 


AST    17  (15-37)  IU/L


 


ALT    11 L  (14-63)  IU/L


 


Alkaline Phosphatase    86  ()  U/L


 


Total Protein    5.5 L  (6.4-8.2)  g/dL


 


Albumin    2.3 L  (3.4-5.0)  g/dL


 


Globulin    3.2  (2.6-4.0)  g/dL


 


Albumin/Globulin Ratio    0.7 L  (0.9-1.6)  











GABRIEL Results - Last 24 hrs: 


                                  Microbiology











 10/26/20 17:10 Aerobic Blood Culture - Preliminary





 Blood - Venous - Lab Draw    NO GROWTH AFTER 2 DAYS





 Anaerobic Blood Culture - Preliminary





    NO GROWTH AFTER 2 DAYS


 


 10/26/20 17:04 Aerobic Blood Culture - Preliminary





 Blood - Venous    NO GROWTH AFTER 2 DAYS





 Anaerobic Blood Culture - Preliminary





    NO GROWTH AFTER 2 DAYS


 


 10/26/20 19:15 Urine Culture - Final





 Urine, Clean Catch    MIXED MARGIE 1,000-10,000 CFU/ML











Med Orders - Current: 


                               Current Medications





Albuterol/Ipratropium (Duoneb 3.0-0.5 Mg/3 Ml)  3 ml NEB Q4HRRT PRN


   PRN Reason: Shortness Of Breath/wheezing


Apixaban (Eliquis)  5 mg PO BID [facility]


   Last Admin: 10/29/20 08:27 Dose:  5 mg


   Documented by: 


Budesonide (Pulmicort)  0.5 mg INH BID [facility]


   Last Admin: 10/29/20 08:30 Dose:  0.5 mg


   Documented by: 


Clonazepam (Klonopin)  2 mg PO Q8H PRN


   PRN Reason: anxiety


   Last Admin: 10/29/20 05:15 Dose:  2 mg


   Documented by: 


Furosemide (Lasix)  40 mg PO DAILY [facility]


   Last Admin: 10/29/20 08:28 Dose:  40 mg


   Documented by: 


Influenza Virus Vaccine (Afluria Quad 2020-21 (3yr Up))  60 mcg IM .ONCE ONE


   Stop: 10/29/20 12:01


   Last Admin: 10/29/20 11:13 Dose:  60 mcg


   Documented by: 


Levothyroxine Sodium (Levothyroxine)  25 mcg PO DAILY [facility]


   Last Admin: 10/29/20 08:28 Dose:  25 mcg


   Documented by: 


Ondansetron HCl (Zofran)  4 mg IVPUSH Q4H PRN


   PRN Reason: Nausea/Vomiting


Anoro Ellipta 62.5- (25 Mcg)  0 each INH DAILY [facility]


   Last Admin: 10/29/20 08:29 Dose:  Not Given


   Documented by: 


Polysaccharide Iron Complex (Ferrex 150)  150 mg PO DAILY [facility]


   Last Admin: 10/29/20 08:28 Dose:  150 mg


   Documented by: 


Sodium Chloride (Saline Flush)  10 ml FLUSH ASDIRECTED PRN


   PRN Reason: Keep Vein Open


   Last Admin: 10/26/20 20:00 Dose:  10 ml


   Documented by: 


Sodium Chloride (Saline Flush)  2.5 ml FLUSH ASDIRECTED PRN


   PRN Reason: Keep Vein Open


   Last Admin: 10/26/20 20:00 Dose:  2.5 ml


   Documented by: 





Discontinued Medications





Potassium Chloride 40 meq/ (Premix)  100 mls @ 25 mls/hr IV ONETIME ONE


   Stop: 10/26/20 21:50


   Last Admin: 10/26/20 19:00 Dose:  25 mls/hr


   Documented by: 


Magnesium Sulfate (Magnesium Sulfate In Water Premix)  2 gm in 50 mls @ 50 

mls/hr IV STAT STA


   Stop: 10/26/20 18:53


   Last Admin: 10/26/20 18:59 Dose:  50 mls/hr


   Documented by: 


Ceftriaxone Sodium/Dextrose 1 (gm/ Premix)  50 mls @ 100 mls/hr IV ONETIME ONE


   Stop: 10/26/20 20:21


   Last Admin: 10/26/20 19:58 Dose:  100 mls/hr


   Documented by: 


Ceftriaxone Sodium/Dextrose 1 (gm/ Premix)  50 mls @ 100 mls/hr IV Q24H [facility]


   Last Admin: 10/28/20 12:01 Dose:  100 mls/hr


   Documented by: 


Potassium Chloride/Sodium Chloride (Normal Saline With 40 Meq Kcl)  1,000 mls @ 

150 mls/hr IV ASDIRECTED [facility]


   Stop: 10/28/20 14:24


Magnesium Sulfate 4 gm/ Premix  100 mls @ 50 mls/hr IV ONETIME ONE


   Stop: 10/28/20 10:14


   Last Admin: 10/28/20 09:01 Dose:  50 mls/hr


   Documented by: 


Potassium Chloride 60 meq/ (Sodium Chloride)  1,030 mls @ 125 mls/hr IV Q6H [facility]


   Stop: 10/28/20 21:29


   Last Admin: 10/28/20 15:38 Dose:  125 mls/hr


   Documented by: 


Magnesium Sulfate (Magnesium Sulfate In Water Premix)  2 gm in 50 mls @ 50 

mls/hr IV ONETIME ONE


   Stop: 10/29/20 08:29


   Last Admin: 10/29/20 08:21 Dose:  50 mls/hr


   Documented by: 


Influenza Virus Vaccine (Pharmacy To Dose - Influenza Vaccine)  1 each IM 

ONETIME ONE


   Stop: 10/26/20 23:30


Influenza Virus Vaccine (Afluria Quad 2020-21 (3yr Up))  60 mcg IM .ONCE ONE


   Stop: 10/28/20 09:01


Lorazepam (Ativan)  1 mg IVPUSH Q4H PRN


   PRN Reason: Anxiety


Potassium Chloride (Klor-Con M20)  40 meq PO ONETIME ONE


   Stop: 10/26/20 17:50


   Last Admin: 10/26/20 19:03 Dose:  40 meq


   Documented by: 


Potassium Chloride (Klor-Con M20)  40 meq PO ONETIME ONE


   Stop: 10/27/20 07:41


   Last Admin: 10/27/20 08:42 Dose:  40 meq


   Documented by: 


Potassium Chloride (Potassium Chloride)  40 meq PO ONETIME ONE


   Stop: 10/28/20 07:46


   Last Admin: 10/28/20 09:01 Dose:  40 meq


   Documented by: 


Potassium Chloride (Potassium Chloride)  40 meq PO ONETIME ONE


   Stop: 10/28/20 14:52


   Last Admin: 10/28/20 15:20 Dose:  Not Given


   Documented by: 


Potassium Chloride (Klor-Con M20)  20 meq PO DAILY [place]


Potassium Chloride (Klor-Con M20)  40 meq PO ONETIME ONE


   Stop: 10/29/20 07:20


   Last Admin: 10/29/20 08:20 Dose:  40 meq


   Documented by: 











<Akil Daugherty - Last Filed: 10/29/20 13:11>





**Discharge Summary





- Hospital Course


Free Text/Narrative:: 











I have seen and evaluated the patient and agree with the residents note unless 

specified in my note








- Referral to Home Health


Primary Care Physician: 


Quinton Lorenzo MD








- Discharge Diagnosis/Problem(s)


(1) AMS (altered mental status)


SNOMED Code(s): 009799968


   ICD Code: R41.82 - ALTERED MENTAL STATUS, UNSPECIFIED   Status: Acute   

Current Visit: Yes   





(2) Acute respiratory failure with hypoxia


SNOMED Code(s): 75787299, 104667184


   ICD Code: J96.01 - ACUTE RESPIRATORY FAILURE WITH HYPOXIA   Status: Acute   

Current Visit: No   





(3) UTI, Urinary tract infectious disease


SNOMED Code(s): 71187872


   ICD Code: N39.0 - URINARY TRACT INFECTION, SITE NOT SPECIFIED   Status: Acute

  Current Visit: No   





(4) COPD (chronic obstructive pulmonary disease)


SNOMED Code(s): 75618727


   ICD Code: J44.9 - CHRONIC OBSTRUCTIVE PULMONARY DISEASE, UNSPECIFIED   

Status: Acute   Current Visit: Yes   





(5) History of pulmonary embolus (PE)


SNOMED Code(s): 087297998


   ICD Code: Z86.711 - PERSONAL HISTORY OF PULMONARY EMBOLISM   Status: Acute   

Current Visit: Yes   





(6) UTI (urinary tract infection)


SNOMED Code(s): 61375254


   ICD Code: N39.0 - URINARY TRACT INFECTION, SITE NOT SPECIFIED   Status: Acute

  Current Visit: Yes   





- Patient Data


Vitals - Most Recent: 


                                Last Vital Signs











Temp  36.5 C   10/29/20 08:00


 


Pulse  73   10/29/20 08:00


 


Resp  18   10/29/20 08:00


 


BP  90/55 L  10/29/20 08:00


 


Pulse Ox  94 L  10/29/20 08:00











I&O - Last 24 hours: 


                                 Intake & Output











 10/28/20 10/29/20 10/29/20





 22:59 06:59 14:59


 


Intake Total 800 500 


 


Output Total 1350  


 


Balance -550 500 











Lab Results - Last 24 hrs: 


                         Laboratory Results - last 24 hr











  10/28/20 10/29/20 10/29/20 Range/Units





  14:10 05:07 05:07 


 


WBC   4.45   (4.0-11.0)  K/uL


 


RBC   3.87 L   (4.30-5.90)  M/uL


 


Hgb   9.5 L   (12.0-16.0)  g/dL


 


Hct   33.3 L   (36.0-46.0)  %


 


MCV   86.0   (80.0-98.0)  fL


 


MCH   24.5 L   (27.0-32.0)  pg


 


MCHC   28.5 L   (31.0-37.0)  g/dL


 


RDW Std Deviation   60.3   (28.0-62.0)  fl


 


RDW Coeff of Jason   19 H   (11.0-15.0)  %


 


Plt Count   155   (150-400)  K/uL


 


MPV   10.10   (7.40-12.00)  fL


 


Neut % (Auto)   53.3   (48.0-80.0)  %


 


Lymph % (Auto)   34.8   (16.0-40.0)  %


 


Mono % (Auto)   10.3   (0.0-15.0)  %


 


Eos % (Auto)   1.6   (0.0-7.0)  %


 


Baso % (Auto)   0.0   (0.0-1.5)  %


 


Neut # (Auto)   2.4   (1.4-5.7)  K/uL


 


Lymph # (Auto)   1.6   (0.6-2.4)  K/uL


 


Mono # (Auto)   0.5   (0.0-0.8)  K/uL


 


Eos # (Auto)   0.1   (0.0-0.7)  K/uL


 


Baso # (Auto)   0.0   (0.0-0.1)  K/uL


 


Nucleated RBC %   0.0   /100WBC


 


Nucleated RBCs #   0   K/uL


 


Sodium    143  (136-145)  mmol/L


 


Potassium  2.8 L   3.3 L  (3.5-5.1)  mmol/L


 


Chloride    103  ()  mmol/L


 


Carbon Dioxide    33.7 H  (21.0-32.0)  mmol/L


 


BUN    3 L  (7.0-18.0)  mg/dL


 


Creatinine    0.7  (0.6-1.0)  mg/dL


 


Est Cr Clr Drug Dosing    87.27  mL/min


 


Estimated GFR (MDRD)    > 60.0  ml/min


 


Glucose    84  ()  mg/dL


 


Calcium    8.8  (8.5-10.1)  mg/dL


 


Magnesium    1.6 L  (1.8-2.4)  mg/dL


 


Total Bilirubin    0.3  (0.2-1.0)  mg/dL


 


AST    17  (15-37)  IU/L


 


ALT    11 L  (14-63)  IU/L


 


Alkaline Phosphatase    86  ()  U/L


 


Total Protein    5.5 L  (6.4-8.2)  g/dL


 


Albumin    2.3 L  (3.4-5.0)  g/dL


 


Globulin    3.2  (2.6-4.0)  g/dL


 


Albumin/Globulin Ratio    0.7 L  (0.9-1.6)  











GABRIEL Results - Last 24 hrs: 


                                  Microbiology











 10/26/20 17:10 Aerobic Blood Culture - Preliminary





 Blood - Venous - Lab Draw    NO GROWTH AFTER 2 DAYS





 Anaerobic Blood Culture - Preliminary





    NO GROWTH AFTER 2 DAYS


 


 10/26/20 17:04 Aerobic Blood Culture - Preliminary





 Blood - Venous    NO GROWTH AFTER 2 DAYS





 Anaerobic Blood Culture - Preliminary





    NO GROWTH AFTER 2 DAYS


 


 10/26/20 19:15 Urine Culture - Final





 Urine, Clean Catch    MIXED MARGIE 1,000-10,000 CFU/ML











Med Orders - Current: 


                               Current Medications





Albuterol/Ipratropium (Duoneb 3.0-0.5 Mg/3 Ml)  3 ml NEB Q4HRRT PRN


   PRN Reason: Shortness Of Breath/wheezing


Apixaban (Eliquis)  5 mg PO BID [facility]


   Last Admin: 10/29/20 08:27 Dose:  5 mg


   Documented by: 


Budesonide (Pulmicort)  0.5 mg INH BID [facility]


   Last Admin: 10/29/20 08:30 Dose:  0.5 mg


   Documented by: 


Clonazepam (Klonopin)  2 mg PO Q8H PRN


   PRN Reason: anxiety


   Last Admin: 10/29/20 05:15 Dose:  2 mg


   Documented by: 


Furosemide (Lasix)  40 mg PO DAILY [facility]


   Last Admin: 10/29/20 08:28 Dose:  40 mg


   Documented by: 


Levothyroxine Sodium (Levothyroxine)  25 mcg PO DAILY [facility]


   Last Admin: 10/29/20 08:28 Dose:  25 mcg


   Documented by: 


Ondansetron HCl (Zofran)  4 mg IVPUSH Q4H PRN


   PRN Reason: Nausea/Vomiting


Anoro Ellipta 62.5- (25 Mcg)  0 each INH DAILY [facility]


   Last Admin: 10/29/20 08:29 Dose:  Not Given


   Documented by: 


Polysaccharide Iron Complex (Ferrex 150)  150 mg PO DAILY [facility]


   Last Admin: 10/29/20 08:28 Dose:  150 mg


   Documented by: 


Sodium Chloride (Saline Flush)  10 ml FLUSH ASDIRECTED PRN


   PRN Reason: Keep Vein Open


   Last Admin: 10/26/20 20:00 Dose:  10 ml


   Documented by: 


Sodium Chloride (Saline Flush)  2.5 ml FLUSH ASDIRECTED PRN


   PRN Reason: Keep Vein Open


   Last Admin: 10/26/20 20:00 Dose:  2.5 ml


   Documented by: 





Discontinued Medications





Potassium Chloride 40 meq/ (Premix)  100 mls @ 25 mls/hr IV ONETIME ONE


   Stop: 10/26/20 21:50


   Last Admin: 10/26/20 19:00 Dose:  25 mls/hr


   Documented by: 


Magnesium Sulfate (Magnesium Sulfate In Water Premix)  2 gm in 50 mls @ 50 

mls/hr IV STAT STA


   Stop: 10/26/20 18:53


   Last Admin: 10/26/20 18:59 Dose:  50 mls/hr


   Documented by: 


Ceftriaxone Sodium/Dextrose 1 (gm/ Premix)  50 mls @ 100 mls/hr IV ONETIME ONE


   Stop: 10/26/20 20:21


   Last Admin: 10/26/20 19:58 Dose:  100 mls/hr


   Documented by: 


Ceftriaxone Sodium/Dextrose 1 (gm/ Premix)  50 mls @ 100 mls/hr IV Q24H [facility]


   Last Admin: 10/28/20 12:01 Dose:  100 mls/hr


   Documented by: 


Potassium Chloride/Sodium Chloride (Normal Saline With 40 Meq Kcl)  1,000 mls @ 

150 mls/hr IV ASDIRECTED [place]


   Stop: 10/28/20 14:24


Magnesium Sulfate 4 gm/ Premix  100 mls @ 50 mls/hr IV ONETIME ONE


   Stop: 10/28/20 10:14


   Last Admin: 10/28/20 09:01 Dose:  50 mls/hr


   Documented by: 


Potassium Chloride 60 meq/ (Sodium Chloride)  1,030 mls @ 125 mls/hr IV Q6H [place]


   Stop: 10/28/20 21:29


   Last Admin: 10/28/20 15:38 Dose:  125 mls/hr


   Documented by: 


Magnesium Sulfate (Magnesium Sulfate In Water Premix)  2 gm in 50 mls @ 50 

mls/hr IV ONETIME ONE


   Stop: 10/29/20 08:29


   Last Admin: 10/29/20 08:21 Dose:  50 mls/hr


   Documented by: 


Influenza Virus Vaccine (Pharmacy To Dose - Influenza Vaccine)  1 each IM 

ONETIME ONE


   Stop: 10/26/20 23:30


Influenza Virus Vaccine (Afluria Quad 2020-21 (3yr Up))  60 mcg IM .ONCE ONE


   Stop: 10/28/20 09:01


Influenza Virus Vaccine (Afluria Quad 2020-21 (3yr Up))  60 mcg IM .ONCE ONE


   Stop: 10/29/20 12:01


   Last Admin: 10/29/20 11:13 Dose:  60 mcg


   Documented by: 


Lorazepam (Ativan)  1 mg IVPUSH Q4H PRN


   PRN Reason: Anxiety


Potassium Chloride (Klor-Con M20)  40 meq PO ONETIME ONE


   Stop: 10/26/20 17:50


   Last Admin: 10/26/20 19:03 Dose:  40 meq


   Documented by: 


Potassium Chloride (Klor-Con M20)  40 meq PO ONETIME ONE


   Stop: 10/27/20 07:41


   Last Admin: 10/27/20 08:42 Dose:  40 meq


   Documented by: 


Potassium Chloride (Potassium Chloride)  40 meq PO ONETIME ONE


   Stop: 10/28/20 07:46


   Last Admin: 10/28/20 09:01 Dose:  40 meq


   Documented by: 


Potassium Chloride (Potassium Chloride)  40 meq PO ONETIME ONE


   Stop: 10/28/20 14:52


   Last Admin: 10/28/20 15:20 Dose:  Not Given


   Documented by: 


Potassium Chloride (Klor-Con M20)  20 meq PO DAILY [place]


Potassium Chloride (Klor-Con M20)  40 meq PO ONETIME ONE


   Stop: 10/29/20 07:20


   Last Admin: 10/29/20 08:20 Dose:  40 meq


   Documented by:

## 2021-01-29 NOTE — CR
INDICATION:



Dyspnea. Altered mental status



COMPARISON:



October 26, 2020



TECHNIQUE:



Single-view portable chest radiograph obtained is an AP upright study



FINDINGS:



TUBES AND LINES: None.



HEART AND MEDIASTINUM: The heart size is normal. The mediastinal contour 

appears normal for patient age.



LUNGS AND PLEURAL SPACES: The lungs appear normal.The pleural spaces are 

unremarkable.



OSSEOUS STRUCTURES: Age-appropriate appearance. No acute focal finding.



IMPRESSION:



No evidence of active pulmonary disease.



Dictated by Deepak Amin MD @ Jan 29 2021 11:08AM



Signed by Dr. Deepak Amin @ Jan 29 2021 11:09AM

## 2021-01-29 NOTE — EDM.PDOC
ED HPI GENERAL MEDICAL PROBLEM





- General


Stated Complaint: sob


Time Seen by Provider: 01/29/21 10:29


Source of Information: Reports: Patient


History Limitations: Reports: Altered Mental Status





- History of Present Illness


INITIAL COMMENTS - FREE TEXT/NARRATIVE: 





55-year-old female past medical history COPD on home O2, pulmonary embolism on 

Eliquis, history of GI bleed, hypothyroidism, anxiety, history of acute 

respiratory failure with hypoxia presents for altered mental status.  Patient 

was going to medical clinic and was noted to have oxygen saturations in the 50% 

and was very altered with slurred speech and sent to emergency department for 

further evaluation.  Patient arrives with drowsiness but answering questions 

appropriately.  She states that she was feeling short of breath but she feels b

adi now.





- Related Data


                                    Allergies











Allergy/AdvReac Type Severity Reaction Status Date / Time


 


codeine Allergy  Hives Verified 10/26/20 16:59


 


pseudoephedrine Allergy  palpitation Verified 10/26/20 16:59





   s  


 


Sulfa (Sulfonamide Allergy  unknown Verified 10/26/20 16:59





Antibiotics)     











Home Meds: 


                                    Home Meds





ClonazePAM [KlonoPIN] 2 mg PO TID PRN 05/31/18 [History]


Umeclidinium Brm/Vilanterol Tr [Anoro Ellipta 62.5-25 MCG] 1 puff IH DAILY 

07/28/20 [History]


Iron Polysaccharides Complex [Ferrex 150] 150 mg PO DAILY 30 Days #30 cap 

07/29/20 [Rx]


Furosemide 40 mg PO DAILY 10/26/20 [History]


Levothyroxine 25 mcg PO DAILY 10/26/20 [History]


Apixaban [Eliquis] 5 mg PO BID  tablet 10/29/20 [Rx]


Potassium Chloride 10 meq PO DAILY 30 Days #30 tablet.er 10/29/20 [Rx]











Past Medical History


Cardiovascular History: Reports: Blood Clots/VTE/DVT


Respiratory History: Reports: COPD, SOB


OB/GYN History: Reports: Pregnancy


Psychiatric History: Reports: Anxiety, Depression, PTSD





- Infectious Disease History


Infectious Disease History: Reports: Chicken Pox, Measles, Mumps





- Past Surgical History


HEENT Surgical History: Reports: Adenoidectomy, Tonsillectomy


GI Surgical History: Reports: Appendectomy, Cholecystectomy, Colostomy, Hernia, 

Abdominal, Other (See Below)


Other GI Surgeries/Procedures: prior colostomy and ileostomy


Female  Surgical History: Reports: Hysterectomy, Salpingo-Oophorectomy


Musculoskeletal Surgical History: Reports: Other (See Below)


Other Musculoskeletal Surgeries/Procedures:: right thumb surgery





Social & Family History





- Family History


Family Medical History: No Pertinent Family History





- Caffeine Use


Caffeine Use: Reports: None





ED ROS GENERAL





- Review of Systems


Review Of Systems: Comprehensive ROS is negative, except as noted in HPI.





ED EXAM, GENERAL





- Physical Exam


Exam: See Below


Exam Limited By: No Limitations


General Appearance: Alert, Other (Ashen appearance, drowsy but arousable and 

answering questions appropriately)


Eye Exam: Bilateral Eye: EOMI, PERRL


Throat/Mouth: Normal Voice, No Airway Compromise


Head: Atraumatic, Normocephalic


Neck: Normal Inspection


Respiratory/Chest: No Respiratory Distress, Lungs Clear, Normal Breath Sounds, 

No Accessory Muscle Use


Cardiovascular: Normal Peripheral Pulses, Regular Rate, Rhythm, No Edema


GI/Abdominal: Soft, Non-Tender


Extremities: Normal Inspection


Neurological: Alert


Psychiatric: Normal Affect


Skin Exam: Dry, Intact, Pallor


  ** #1 Interpretation


EKG Date: 01/29/21


Time: 10:31


Rhythm: NSR


Rate (Beats/Min): 92


Axis: Normal


P-Wave: Present


QRS: Normal


ST-T: Normal


QT: Normal


MT/PQ Interval: 182


EKG Interpretation Comments: 





Poor baseline obscures interpretation, no overt ischemic changes





Course





- Vital Signs


Last Recorded V/S: 


                                Last Vital Signs











Temp  96.7 F L  01/29/21 10:30


 


Pulse  66   01/29/21 13:04


 


Resp  24 H  01/29/21 13:04


 


BP  114/71   01/29/21 13:04


 


Pulse Ox  100   01/29/21 13:04














- Orders/Labs/Meds


Orders: 


                               Active Orders 24 hr











 Category Date Time Status


 


 Blood Glucose Check, Bedside [RC] ONETIME Care  01/29/21 10:36 Active


 


 Cardiac Monitoring [RC] .AS DIRECTED Care  01/29/21 10:35 Active


 


 EKG Documentation Completion [RC] STAT Care  01/29/21 10:35 Active


 


 Pulse Oximetry [RC] ASDIRECTED Care  01/29/21 10:35 Active


 


 Verify Patient Consent Obtain [RC] ASDIRECTED Care  01/29/21 12:02 Active


 


 CULTURE BLOOD [BC] Stat Lab  01/29/21 12:00 Results


 


 CULTURE BLOOD [BC] Stat Lab  01/29/21 12:10 Results


 


 RED BLOOD CELLS LP [BBK] Stat Lab  01/29/21 12:00 Results


 


 TYPE AND SCREEN [BBK] Stat Lab  01/29/21 12:00 Results


 


 Potassium Chloride Riders [KCL in Water 40 MEQ/100 ML] Med  01/29/21 12:59 

Active





 40 meq   





 Premix Bag 1 bag   





 IV ONETIME   


 


 Sodium Chloride 0.9% [Normal Saline] 1,000 ml Med  01/29/21 13:30 Active





 IV ASDIRECTED   


 


 Sodium Chloride 0.9% [Saline Flush] Med  01/29/21 10:35 Active





 10 ml FLUSH ASDIRECTED PRN   


 


 Sodium Chloride 0.9% [Saline Flush] Med  01/29/21 10:35 Active





 2.5 ml FLUSH ASDIRECTED PRN   


 


 Blood Culture x2 Reflex Set [OM.PC] Stat Ot  01/29/21 10:45 Ordered


 


 Saline Lock Insert [OM.PC] Stat Oth  01/29/21 10:35 Ordered


 


 Transfuse Red Blood Cells [COMM] Stat Ot  01/29/21 12:01 Ordered








                                Medication Orders





Potassium Chloride 40 meq/ (Premix)  100 mls @ 25 mls/hr IV ONETIME ONE


   Stop: 01/29/21 16:58


   Last Admin: 01/29/21 13:26  Dose: 25 mls/hr


   Documented by: MERLYN


Sodium Chloride (Normal Saline)  1,000 mls @ 125 mls/hr IV ASDIRECTED [place]


   Last Admin: 01/29/21 13:26  Dose: 125 mls/hr


   Documented by: MERLYN


Sodium Chloride (Saline Flush)  10 ml FLUSH ASDIRECTED PRN


   PRN Reason: Keep Vein Open


   Last Admin: 01/29/21 10:59  Dose: 10 ml


   Documented by: MERLYN


Sodium Chloride (Saline Flush)  2.5 ml FLUSH ASDIRECTED PRN


   PRN Reason: Keep Vein Open


   Last Admin: 01/29/21 10:58  Dose: 2.5 ml


   Documented by: MERLYN








Labs: 


                                Laboratory Tests











  01/29/21 01/29/21 01/29/21 Range/Units





  10:43 10:44 11:10 


 


WBC     (4.0-11.0)  K/uL


 


RBC     (4.30-5.90)  M/uL


 


Hgb     (12.0-16.0)  g/dL


 


Hct     (36.0-46.0)  %


 


MCV     (80.0-98.0)  fL


 


MCH     (27.0-32.0)  pg


 


MCHC     (31.0-37.0)  g/dL


 


RDW Std Deviation     (28.0-62.0)  fl


 


RDW Coeff of Jason     (11.0-15.0)  %


 


Plt Count     (150-400)  K/uL


 


MPV     (7.40-12.00)  fL


 


Neut % (Auto)     (48.0-80.0)  %


 


Lymph % (Auto)     (16.0-40.0)  %


 


Mono % (Auto)     (0.0-15.0)  %


 


Eos % (Auto)     (0.0-7.0)  %


 


Baso % (Auto)     (0.0-1.5)  %


 


Neut # (Auto)     (1.4-5.7)  K/uL


 


Lymph # (Auto)     (0.6-2.4)  K/uL


 


Mono # (Auto)     (0.0-0.8)  K/uL


 


Eos # (Auto)     (0.0-0.7)  K/uL


 


Baso # (Auto)     (0.0-0.1)  K/uL


 


Nucleated RBC %     /100WBC


 


Nucleated RBCs #     K/uL


 


INR     


 


APTT     (18.6-31.3)  SEC


 


D-Dimer, Quantitative     (0.0-0.50)  mg/L FEU


 


VBG pH    7.42 H  (7.31-7.41)  


 


VBG pCO2    59 H  (35-45)  mmHG


 


VBG pO2    17 L  (30-40)  mmHG


 


VBG HCO3    38 H  (22-30)  mEq/L


 


VBG Total CO2    37 L  (41-51)  mmol/L


 


VBG Base Excess    12.2 H  (-3.0-3.0)  


 


Lactate  4.8 H*    (0.20-2.00)  mmol/L


 


Sodium     (136-145)  mmol/L


 


Potassium     (3.5-5.1)  mmol/L


 


Chloride     ()  mmol/L


 


Carbon Dioxide     (21.0-32.0)  mmol/L


 


BUN     (7.0-18.0)  mg/dL


 


Creatinine     (0.6-1.0)  mg/dL


 


Est Cr Clr Drug Dosing     


 


Estimated GFR (MDRD)     ml/min


 


Glucose     ()  mg/dL


 


POC Glucose   134 H   ()  mg/dL


 


Calcium     (8.5-10.1)  mg/dL


 


Magnesium     (1.8-2.4)  mg/dL


 


Total Bilirubin     (0.2-1.0)  mg/dL


 


AST     (15-37)  IU/L


 


ALT     (14-63)  IU/L


 


Alkaline Phosphatase     ()  U/L


 


Troponin I     (0.000-0.056)  ng/mL


 


C-Reactive Protein     (0.00-0.90)  mg/dL


 


B-Natriuretic Peptide     (<100)  PG/ML


 


Total Protein     (6.4-8.2)  g/dL


 


Albumin     (3.4-5.0)  g/dL


 


Globulin     (2.6-4.0)  g/dL


 


Albumin/Globulin Ratio     (0.9-1.6)  


 


Urine Color     


 


Urine Appearance     


 


Urine pH     (5.0-8.0)  


 


Ur Specific Gravity     (1.001-1.035)  


 


Urine Protein     (NEGATIVE)  mg/dL


 


Urine Glucose (UA)     (NEGATIVE)  mg/dL


 


Urine Ketones     (NEGATIVE)  mg/dL


 


Urine Occult Blood     (NEGATIVE)  


 


Urine Nitrite     (NEGATIVE)  


 


Urine Bilirubin     (NEGATIVE)  


 


Urine Urobilinogen     (<2.0)  EU/dL


 


Ur Leukocyte Esterase     (NEGATIVE)  


 


Urine RBC     (0-2/HPF)  


 


Urine WBC     (0-5/HPF)  


 


Ur Epithelial Cells     (NONE-FEW)  


 


Urine Bacteria     (NEGATIVE)  


 


Ethyl Alcohol     mg/dL


 


SARS-CoV-2 RNA (RODRIGO)     (NEGATIVE)  


 


Blood Type     


 


Antibody Screen     


 


Crossmatch     














  01/29/21 01/29/21 01/29/21 Range/Units





  11:15 11:40 12:00 


 


WBC   5.21   (4.0-11.0)  K/uL


 


RBC   2.92 L   (4.30-5.90)  M/uL


 


Hgb   6.5 L   (12.0-16.0)  g/dL


 


Hct   23.3 L   (36.0-46.0)  %


 


MCV   79.8 L   (80.0-98.0)  fL


 


MCH   22.3 L   (27.0-32.0)  pg


 


MCHC   27.9 L   (31.0-37.0)  g/dL


 


RDW Std Deviation   59.0   (28.0-62.0)  fl


 


RDW Coeff of Jason   21 H   (11.0-15.0)  %


 


Plt Count   147 L   (150-400)  K/uL


 


MPV   10.40   (7.40-12.00)  fL


 


Neut % (Auto)   64.1   (48.0-80.0)  %


 


Lymph % (Auto)   25.1   (16.0-40.0)  %


 


Mono % (Auto)   10.0   (0.0-15.0)  %


 


Eos % (Auto)   0.6   (0.0-7.0)  %


 


Baso % (Auto)   0.2   (0.0-1.5)  %


 


Neut # (Auto)   3.3   (1.4-5.7)  K/uL


 


Lymph # (Auto)   1.3   (0.6-2.4)  K/uL


 


Mono # (Auto)   0.5   (0.0-0.8)  K/uL


 


Eos # (Auto)   0.0   (0.0-0.7)  K/uL


 


Baso # (Auto)   0.0   (0.0-0.1)  K/uL


 


Nucleated RBC %   0.0   /100WBC


 


Nucleated RBCs #   0   K/uL


 


INR    1.65  


 


APTT    25.6  (18.6-31.3)  SEC


 


D-Dimer, Quantitative    1.23 H  (0.0-0.50)  mg/L FEU


 


VBG pH     (7.31-7.41)  


 


VBG pCO2     (35-45)  mmHG


 


VBG pO2     (30-40)  mmHG


 


VBG HCO3     (22-30)  mEq/L


 


VBG Total CO2     (41-51)  mmol/L


 


VBG Base Excess     (-3.0-3.0)  


 


Lactate     (0.20-2.00)  mmol/L


 


Sodium     (136-145)  mmol/L


 


Potassium     (3.5-5.1)  mmol/L


 


Chloride     ()  mmol/L


 


Carbon Dioxide     (21.0-32.0)  mmol/L


 


BUN     (7.0-18.0)  mg/dL


 


Creatinine     (0.6-1.0)  mg/dL


 


Est Cr Clr Drug Dosing     


 


Estimated GFR (MDRD)     ml/min


 


Glucose     ()  mg/dL


 


POC Glucose     ()  mg/dL


 


Calcium     (8.5-10.1)  mg/dL


 


Magnesium     (1.8-2.4)  mg/dL


 


Total Bilirubin     (0.2-1.0)  mg/dL


 


AST     (15-37)  IU/L


 


ALT     (14-63)  IU/L


 


Alkaline Phosphatase     ()  U/L


 


Troponin I     (0.000-0.056)  ng/mL


 


C-Reactive Protein     (0.00-0.90)  mg/dL


 


B-Natriuretic Peptide     (<100)  PG/ML


 


Total Protein     (6.4-8.2)  g/dL


 


Albumin     (3.4-5.0)  g/dL


 


Globulin     (2.6-4.0)  g/dL


 


Albumin/Globulin Ratio     (0.9-1.6)  


 


Urine Color  YELLOW    


 


Urine Appearance  CLEAR    


 


Urine pH  7.5    (5.0-8.0)  


 


Ur Specific Gravity  1.015    (1.001-1.035)  


 


Urine Protein  NEGATIVE    (NEGATIVE)  mg/dL


 


Urine Glucose (UA)  NEGATIVE    (NEGATIVE)  mg/dL


 


Urine Ketones  NEGATIVE    (NEGATIVE)  mg/dL


 


Urine Occult Blood  SMALL H    (NEGATIVE)  


 


Urine Nitrite  NEGATIVE    (NEGATIVE)  


 


Urine Bilirubin  NEGATIVE    (NEGATIVE)  


 


Urine Urobilinogen  0.2    (<2.0)  EU/dL


 


Ur Leukocyte Esterase  SMALL H    (NEGATIVE)  


 


Urine RBC  1-2    (0-2/HPF)  


 


Urine WBC  3-5    (0-5/HPF)  


 


Ur Epithelial Cells  RARE    (NONE-FEW)  


 


Urine Bacteria  2+ H    (NEGATIVE)  


 


Ethyl Alcohol     mg/dL


 


SARS-CoV-2 RNA (RODRIGO)     (NEGATIVE)  


 


Blood Type     


 


Antibody Screen     


 


Crossmatch     














  01/29/21 01/29/21 01/29/21 Range/Units





  12:00 12:00 12:00 


 


WBC     (4.0-11.0)  K/uL


 


RBC     (4.30-5.90)  M/uL


 


Hgb     (12.0-16.0)  g/dL


 


Hct     (36.0-46.0)  %


 


MCV     (80.0-98.0)  fL


 


MCH     (27.0-32.0)  pg


 


MCHC     (31.0-37.0)  g/dL


 


RDW Std Deviation     (28.0-62.0)  fl


 


RDW Coeff of Jason     (11.0-15.0)  %


 


Plt Count     (150-400)  K/uL


 


MPV     (7.40-12.00)  fL


 


Neut % (Auto)     (48.0-80.0)  %


 


Lymph % (Auto)     (16.0-40.0)  %


 


Mono % (Auto)     (0.0-15.0)  %


 


Eos % (Auto)     (0.0-7.0)  %


 


Baso % (Auto)     (0.0-1.5)  %


 


Neut # (Auto)     (1.4-5.7)  K/uL


 


Lymph # (Auto)     (0.6-2.4)  K/uL


 


Mono # (Auto)     (0.0-0.8)  K/uL


 


Eos # (Auto)     (0.0-0.7)  K/uL


 


Baso # (Auto)     (0.0-0.1)  K/uL


 


Nucleated RBC %     /100WBC


 


Nucleated RBCs #     K/uL


 


INR     


 


APTT     (18.6-31.3)  SEC


 


D-Dimer, Quantitative     (0.0-0.50)  mg/L FEU


 


VBG pH     (7.31-7.41)  


 


VBG pCO2     (35-45)  mmHG


 


VBG pO2     (30-40)  mmHG


 


VBG HCO3     (22-30)  mEq/L


 


VBG Total CO2     (41-51)  mmol/L


 


VBG Base Excess     (-3.0-3.0)  


 


Lactate     (0.20-2.00)  mmol/L


 


Sodium  140    (136-145)  mmol/L


 


Potassium  3.0 L    (3.5-5.1)  mmol/L


 


Chloride  100    ()  mmol/L


 


Carbon Dioxide  34.4 H    (21.0-32.0)  mmol/L


 


BUN  22 H    (7.0-18.0)  mg/dL


 


Creatinine  1.1 H    (0.6-1.0)  mg/dL


 


Est Cr Clr Drug Dosing  TNP    


 


Estimated GFR (MDRD)  51.2    ml/min


 


Glucose  101    ()  mg/dL


 


POC Glucose     ()  mg/dL


 


Calcium  8.5    (8.5-10.1)  mg/dL


 


Magnesium  2.3    (1.8-2.4)  mg/dL


 


Total Bilirubin  1.1 H    (0.2-1.0)  mg/dL


 


AST  54 H    (15-37)  IU/L


 


ALT  28    (14-63)  IU/L


 


Alkaline Phosphatase  152 H    ()  U/L


 


Troponin I  < 0.050    (0.000-0.056)  ng/mL


 


C-Reactive Protein  6.90 H    (0.00-0.90)  mg/dL


 


B-Natriuretic Peptide   287 H   (<100)  PG/ML


 


Total Protein  6.9    (6.4-8.2)  g/dL


 


Albumin  2.8 L    (3.4-5.0)  g/dL


 


Globulin  4.1 H    (2.6-4.0)  g/dL


 


Albumin/Globulin Ratio  0.7 L    (0.9-1.6)  


 


Urine Color     


 


Urine Appearance     


 


Urine pH     (5.0-8.0)  


 


Ur Specific Gravity     (1.001-1.035)  


 


Urine Protein     (NEGATIVE)  mg/dL


 


Urine Glucose (UA)     (NEGATIVE)  mg/dL


 


Urine Ketones     (NEGATIVE)  mg/dL


 


Urine Occult Blood     (NEGATIVE)  


 


Urine Nitrite     (NEGATIVE)  


 


Urine Bilirubin     (NEGATIVE)  


 


Urine Urobilinogen     (<2.0)  EU/dL


 


Ur Leukocyte Esterase     (NEGATIVE)  


 


Urine RBC     (0-2/HPF)  


 


Urine WBC     (0-5/HPF)  


 


Ur Epithelial Cells     (NONE-FEW)  


 


Urine Bacteria     (NEGATIVE)  


 


Ethyl Alcohol  < 3.0    mg/dL


 


SARS-CoV-2 RNA (RODRIGO)     (NEGATIVE)  


 


Blood Type    A NEGATIVE  


 


Antibody Screen    NEGATIVE  


 


Crossmatch    See Detail  














  01/29/21 Range/Units





  12:30 


 


WBC   (4.0-11.0)  K/uL


 


RBC   (4.30-5.90)  M/uL


 


Hgb   (12.0-16.0)  g/dL


 


Hct   (36.0-46.0)  %


 


MCV   (80.0-98.0)  fL


 


MCH   (27.0-32.0)  pg


 


MCHC   (31.0-37.0)  g/dL


 


RDW Std Deviation   (28.0-62.0)  fl


 


RDW Coeff of Jason   (11.0-15.0)  %


 


Plt Count   (150-400)  K/uL


 


MPV   (7.40-12.00)  fL


 


Neut % (Auto)   (48.0-80.0)  %


 


Lymph % (Auto)   (16.0-40.0)  %


 


Mono % (Auto)   (0.0-15.0)  %


 


Eos % (Auto)   (0.0-7.0)  %


 


Baso % (Auto)   (0.0-1.5)  %


 


Neut # (Auto)   (1.4-5.7)  K/uL


 


Lymph # (Auto)   (0.6-2.4)  K/uL


 


Mono # (Auto)   (0.0-0.8)  K/uL


 


Eos # (Auto)   (0.0-0.7)  K/uL


 


Baso # (Auto)   (0.0-0.1)  K/uL


 


Nucleated RBC %   /100WBC


 


Nucleated RBCs #   K/uL


 


INR   


 


APTT   (18.6-31.3)  SEC


 


D-Dimer, Quantitative   (0.0-0.50)  mg/L FEU


 


VBG pH   (7.31-7.41)  


 


VBG pCO2   (35-45)  mmHG


 


VBG pO2   (30-40)  mmHG


 


VBG HCO3   (22-30)  mEq/L


 


VBG Total CO2   (41-51)  mmol/L


 


VBG Base Excess   (-3.0-3.0)  


 


Lactate   (0.20-2.00)  mmol/L


 


Sodium   (136-145)  mmol/L


 


Potassium   (3.5-5.1)  mmol/L


 


Chloride   ()  mmol/L


 


Carbon Dioxide   (21.0-32.0)  mmol/L


 


BUN   (7.0-18.0)  mg/dL


 


Creatinine   (0.6-1.0)  mg/dL


 


Est Cr Clr Drug Dosing   


 


Estimated GFR (MDRD)   ml/min


 


Glucose   ()  mg/dL


 


POC Glucose   ()  mg/dL


 


Calcium   (8.5-10.1)  mg/dL


 


Magnesium   (1.8-2.4)  mg/dL


 


Total Bilirubin   (0.2-1.0)  mg/dL


 


AST   (15-37)  IU/L


 


ALT   (14-63)  IU/L


 


Alkaline Phosphatase   ()  U/L


 


Troponin I   (0.000-0.056)  ng/mL


 


C-Reactive Protein   (0.00-0.90)  mg/dL


 


B-Natriuretic Peptide   (<100)  PG/ML


 


Total Protein   (6.4-8.2)  g/dL


 


Albumin   (3.4-5.0)  g/dL


 


Globulin   (2.6-4.0)  g/dL


 


Albumin/Globulin Ratio   (0.9-1.6)  


 


Urine Color   


 


Urine Appearance   


 


Urine pH   (5.0-8.0)  


 


Ur Specific Gravity   (1.001-1.035)  


 


Urine Protein   (NEGATIVE)  mg/dL


 


Urine Glucose (UA)   (NEGATIVE)  mg/dL


 


Urine Ketones   (NEGATIVE)  mg/dL


 


Urine Occult Blood   (NEGATIVE)  


 


Urine Nitrite   (NEGATIVE)  


 


Urine Bilirubin   (NEGATIVE)  


 


Urine Urobilinogen   (<2.0)  EU/dL


 


Ur Leukocyte Esterase   (NEGATIVE)  


 


Urine RBC   (0-2/HPF)  


 


Urine WBC   (0-5/HPF)  


 


Ur Epithelial Cells   (NONE-FEW)  


 


Urine Bacteria   (NEGATIVE)  


 


Ethyl Alcohol   mg/dL


 


SARS-CoV-2 RNA (RODRIGO)  NEGATIVE  (NEGATIVE)  


 


Blood Type   


 


Antibody Screen   


 


Crossmatch   











Meds: 


Medications











Generic Name Dose Route Start Last Admin





  Trade Name Freq  PRN Reason Stop Dose Admin


 


Potassium Chloride 40 meq/  100 mls @ 25 mls/hr  01/29/21 12:59  01/29/21 13:26





  Premix  IV  01/29/21 16:58  25 mls/hr





  ONETIME ONE   Administration


 


Sodium Chloride  1,000 mls @ 125 mls/hr  01/29/21 13:30  01/29/21 13:26





  Normal Saline  IV   125 mls/hr





  ASDIRECTED [place]   Administration


 


Sodium Chloride  10 ml  01/29/21 10:35  01/29/21 10:59





  Saline Flush  FLUSH   10 ml





  ASDIRECTED PRN   Administration





  Keep Vein Open  


 


Sodium Chloride  2.5 ml  01/29/21 10:35  01/29/21 10:58





  Saline Flush  FLUSH   2.5 ml





  ASDIRECTED PRN   Administration





  Keep Vein Open  














Discontinued Medications














Generic Name Dose Route Start Last Admin





  Trade Name Freq  PRN Reason Stop Dose Admin


 


Sodium Chloride  1,000 mls @ 999 mls/hr  01/29/21 10:35  01/29/21 10:58





  Normal Saline  IV  01/29/21 11:35  999 mls/hr





  .Bolus ONE   Administration














- Re-Assessments/Exams


Free Text/Narrative Re-Assessment/Exam: 





01/29/21 11:07


Additional history by  notes that patient has been feeling unwell for the

 last couple of weeks.  She has noted dark stools for the last several days.


01/29/21 11:07


We will start broad work-up.  Suspect GI bleed.


01/29/21 11:18


Occult blood is grossly positive.  Stool is brown.


01/29/21 11:31


On chart review it appears that in 2019 patient had GI bleeding.  I am uncertain

 if this was before after she started her Eliquis.  It appears that last year 

she developed a pulmonary embolism requiring transfer to higher level care for 

thrombectomy.  Regarding the GI bleed, it appears that patient had history of GI

 perforation with ileostomy and multiple GI surgeries in the past.


01/29/21 12:03


Patient anemic to 6.5.  2 units PRBC ordered.  We will follow-up additional labs

 and transfer for higher level of care.


01/29/21 13:39


Patient accepted for transfer at [facility] for ED-ED transfer by Dr. Haddad 





Departure





- Departure


Time of Disposition: 13:39


Disposition: DC/[facility]fer to Acute [facility]


Condition: Fair


Clinical Impression: 


GIB (gastrointestinal bleeding)


Qualifiers:


 GI bleed type/associated pathology: unspecified gastrointestinal hemorrhage 

type Qualified Code(s): K92.2 - Gastrointestinal hemorrhage, unspecified








- Discharge Information


Referrals: 


Quinton Lorenzo MD [Primary Care Provider] - 





Critical Care Note





- Critical Care Note


Total Time (mins): 35





Sepsis Event Note (ED)





- Focused Exam


Vital Signs: 


                                   Vital Signs











  Temp Pulse Resp BP Pulse Ox


 


 01/29/21 13:04   66  24 H  114/71  100


 


 01/29/21 11:15   76  22 H  102/56 L  100


 


 01/29/21 11:00   79  23 H  100/82  100


 


 01/29/21 10:30  96.7 F L  73  22 H  135/58 L  94 L














- My Orders


Last 24 Hours: 


My Active Orders





01/29/21 10:35


Cardiac Monitoring [RC] .AS DIRECTED 


EKG Documentation Completion [RC] STAT 


Pulse Oximetry [RC] ASDIRECTED 


Sodium Chloride 0.9% [Saline Flush]   10 ml FLUSH ASDIRECTED PRN 


Sodium Chloride 0.9% [Saline Flush]   2.5 ml FLUSH ASDIRECTED PRN 


Saline Lock Insert [OM.PC] Stat 





01/29/21 10:36


Blood Glucose Check, Bedside [RC] ONETIME 





01/29/21 10:45


Blood Culture x2 Reflex Set [OM.PC] Stat 





01/29/21 12:00


CULTURE BLOOD [BC] Stat 


RED BLOOD CELLS LP [BBK] Stat 


TYPE AND SCREEN [BBK] Stat 





01/29/21 12:01


Transfuse Red Blood Cells [COMM] Stat 





01/29/21 12:02


Verify Patient Consent Obtain [RC] ASDIRECTED 





01/29/21 12:10


CULTURE BLOOD [BC] Stat 





01/29/21 12:59


Potassium Chloride Riders [KCL in Water 40 MEQ/100 ML] 40 meq   Premix Bag 1 bag

IV ONETIME 





01/29/21 13:30


Sodium Chloride 0.9% [Normal Saline] 1,000 ml IV ASDIRECTED 














- Assessment/Plan


Last 24 Hours: 


My Active Orders





01/29/21 10:35


Cardiac Monitoring [RC] .AS DIRECTED 


EKG Documentation Completion [RC] STAT 


Pulse Oximetry [RC] ASDIRECTED 


Sodium Chloride 0.9% [Saline Flush]   10 ml FLUSH ASDIRECTED PRN 


Sodium Chloride 0.9% [Saline Flush]   2.5 ml FLUSH ASDIRECTED PRN 


Saline Lock Insert [OM.PC] Stat 





01/29/21 10:36


Blood Glucose Check, Bedside [RC] ONETIME 





01/29/21 10:45


Blood Culture x2 Reflex Set [OM.PC] Stat 





01/29/21 12:00


CULTURE BLOOD [BC] Stat 


RED BLOOD CELLS LP [BBK] Stat 


TYPE AND SCREEN [BBK] Stat 





01/29/21 12:01


Transfuse Red Blood Cells [COMM] Stat 





01/29/21 12:02


Verify Patient Consent Obtain [RC] ASDIRECTED 





01/29/21 12:10


CULTURE BLOOD [BC] Stat 





01/29/21 12:59


Potassium Chloride Riders [KCL in Water 40 MEQ/100 ML] 40 meq   Premix Bag 1 bag

IV ONETIME 





01/29/21 13:30


Sodium Chloride 0.9% [Normal Saline] 1,000 ml IV ASDIRECTED
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
## 2021-05-24 NOTE — EDM.PDOC
ED HPI GENERAL MEDICAL PROBLEM





- General


Chief Complaint: General


Stated Complaint: VOMITTING, COUGH


Time Seen by Provider: 05/24/21 16:28





- History of Present Illness


INITIAL COMMENTS - FREE TEXT/NARRATIVE: 





History of present illness:


[]





Review of systems: 


As per history of present illness and below otherwise all systems reviewed and 

negative.





Past medical history: 


As per history of present illness and as reviewed below otherwise 

noncontributory.


The patient felt a little lightheaded and dizzy and fell over.  She has an 

abrasion on her occiput.  She has no definite LOC.  The patient was called by 

her primary doctor because her electrolytes were abnormal.  The electrolytes 

were faxed over to us.  She has had chronic weakness and is fed through TPN.  

She has had an ileostomy.  She was admitted recently because TPN was necessary 

and she did not have the bag available to be thought out given so she was placed

 overnight for hydration while she awaited the preparation of her TPN.





Surgical history: 


As per history of present illness and as reviewed below otherwise 

noncontributory.





Social history: 


No reported history of drug or alcohol abuse.





Family history: 


As per history of present illness and as reviewed below otherwise 

noncontributory.





Physical exam:


Constitutional - well developed, well-nourished and in no acute distress


HEENT -abrasion in the posterior occipital area was cleaned and there was no sp

ecific laceration.  Normocephalic, no evidence of trauma - external nose and 

mouth normal - no mass in neck and no JVD - mucosae moist





EYES - full EOM, PERRL, no icterus - no evidence of inflammation, injection, or 

drainage





Respiratory - no respiratory distress, equal bilateral expansion, lungs clear to

 auscultation and no abnormal lung sounds





Cardiovascular - Regular Rhythm with S1 and S2 appreciated and no murmur, gallop

 or rub.





GI - abdomen soft without distension or organomegaly - normal bowel sounds - no 

guard or rebound





Musculoskeletal  no gross deformity of long bones or joints - no tenderness, 

swelling or edema





Neurologic - Alert and oriented times four - CN II-XII grossly intact - motor 

sensory and coordination symmetrically normal





Psychiatric - appropriate mood and affect with normal thought content





Hematologic - No petechiae or purpura - mucosa appropriate color and sclera not 

pale - normal nail bed color and refill





Integument - no rash or evidence of trauma - normal turgor





Diagnostics:


[]





Therapeutics:


[]





Impression: 


[]





Plan:


[]





Definitive disposition and diagnosis as appropriate pending reevaluation and 

review of above.








Other Treatments PTA: dilaudid at 1600


  ** head


Pain Score (Numeric/FACES): 4





- Related Data


                                    Allergies











Allergy/AdvReac Type Severity Reaction Status Date / Time


 


codeine Allergy  Hives Verified 05/24/21 16:26


 


pseudoephedrine Allergy  palpitation Verified 05/24/21 16:26





   s  


 


Sulfa (Sulfonamide Allergy  unknown Verified 05/24/21 16:26





Antibiotics)     











Home Meds: 


                                    Home Meds





ClonazePAM [KlonoPIN] 1 mg PO TID PRN 05/31/18 [History]


Umeclidinium Brm/Vilanterol Tr [Anoro Ellipta 62.5-25 MCG] 1 puff IH DAILY 

07/28/20 [History]


Levothyroxine 50 mcg PO ACBREAKFAST 10/26/20 [History]


Amitriptyline [Elavil] 50 mg PO BEDTIME 01/29/21 [History]


Blood Sugar Diagnostic [Glucose Test Strip] 1 each MC Q6H #1 box 05/14/21 [Rx]


Calcium Carbonate [Tums] 1,000 mg PO Q6H 05/14/21 [History]


HYDROmorphone [Dilaudid] 2 mg PO Q4H PRN 05/14/21 [History]


Lancets 1 each MC Q6H #1 box 05/14/21 [Rx]


Loperamide [Imodium] 4 mg PO Q6H 05/14/21 [History]


Loratadine 10 mg PO DAILY 05/14/21 [History]


Mirtazapine 15 mg PO BEDTIME 05/14/21 [History]


Sucralfate 1 gm PO QID 05/14/21 [History]


fentaNYL [Duragesic] 25 mcg TD Q72H 05/14/21 [History]











Past Medical History


HEENT History: Reports: None


Cardiovascular History: Reports: Blood Clots/VTE/DVT, Other (See Below)


Other Cardiovascular History: on Eliquis in multiple places (?)


Respiratory History: Reports: COPD, SOB, Other (See Below)


Other Respiratory History: not on CPAP


Gastrointestinal History: Reports: None


Genitourinary History: Reports: None


OB/GYN History: Reports: Pregnancy


Musculoskeletal History: Reports: None


Neurological History: Reports: None


Psychiatric History: Reports: Anxiety, Depression, PTSD


Endocrine/Metabolic History: Reports: None


Hematologic History: Reports: None


Immunologic History: Reports: None


Oncologic (Cancer) History: Reports: None


Dermatologic History: Reports: None





- Infectious Disease History


Infectious Disease History: Reports: Chicken Pox, Measles, Mumps





- Past Surgical History


Head Surgeries/Procedures: Reports: None


HEENT Surgical History: Reports: Adenoidectomy, Tonsillectomy


Cardiovascular Surgical History: Reports: None


Respiratory Surgical History: Reports: None


GI Surgical History: Reports: Appendectomy, Cholecystectomy, Colostomy, Hernia, 

Abdominal, Other (See Below)


Other GI Surgeries/Procedures: prior colostomy and ileostomy


Female  Surgical History: Reports: Hysterectomy, Salpingo-Oophorectomy


Musculoskeletal Surgical History: Reports: Other (See Below)


Other Musculoskeletal Surgeries/Procedures:: right thumb surgery





Social & Family History





- Family History


Family Medical History: No Pertinent Family History





- Tobacco Use


Tobacco Use Status *Q: Former Tobacco User


Used Tobacco, but Quit: Yes


Month/Year Tobacco Last Used: 6 months





- Caffeine Use


Caffeine Use: Reports: None





- Recreational Drug Use


Recreational Drug Use: No





ED ROS GENERAL





- Review of Systems


Review Of Systems: Comprehensive ROS is negative, except as noted in HPI.





ED EXAM, GENERAL





- Physical Exam


Exam: See Below


Free Text/Narrative:: 





My physical exam is in the HPI





Course





- Vital Signs


Text/Narrative:: 





I discussed the case with our internist on-call who felt like a bag of saline 

would be a good idea but then she could restrict free water and go home.


Last Recorded V/S: 


                                Last Vital Signs











Temp  36.6 C   05/24/21 16:15


 


Pulse  77   05/24/21 17:35


 


Resp  17   05/24/21 17:35


 


BP  89/54 L  05/24/21 17:35


 


Pulse Ox  99   05/24/21 17:35














- Orders/Labs/Meds


Orders: 


                               Active Orders 24 hr











 Category Date Time Status


 


 Sodium Chloride 0.9% [Normal Saline] 1,000 ml Med  05/24/21 17:19 Active





 IV .Bolus   








                                Medication Orders





Sodium Chloride (Normal Saline)  1,000 mls @ 1,000 mls/hr IV .Bolus ONE


   Stop: 05/24/21 18:18


   Last Admin: 05/24/21 17:34  Dose: 1,000 mls/hr


   Documented by: RALPH








Meds: 


Medications











Generic Name Dose Route Start Last Admin





  Trade Name Freq  PRN Reason Stop Dose Admin


 


Sodium Chloride  1,000 mls @ 1,000 mls/hr  05/24/21 17:19  05/24/21 17:34





  Normal Saline  IV  05/24/21 18:18  1,000 mls/hr





  .Bolus ONE   Administration














Discontinued Medications














Generic Name Dose Route Start Last Admin





  Trade Name Freq  PRN Reason Stop Dose Admin


 


Bacitracin  1 gm  05/24/21 17:43  05/24/21 17:49





  Bacitracin Oint 28.35 Gm Tube  TOP  05/24/21 17:44  1 gm





  STAT STA   Administration














Departure





- Departure


Time of Disposition: 18:30


Disposition: Home, Self-Care 01


Condition: Good


Clinical Impression: 


 Scalp abrasion, Hyponatremia, Elevated transaminase level





Fall


Qualifiers:


 Encounter type: initial encounter Qualified Code(s): W19.XXXA - Unspecified 

fall, initial encounter








- Discharge Information


Instructions:  Hyponatremia, Easy-to-Read


Referrals: 


Quinton Lorenzo MD [Primary Care Provider] - 


Forms:  ED Department Discharge


Additional Instructions: 


Contact the company that adjust your TPN and let them know your sodium was low, 

we gave you a liter of saline, and you are going to restrict free water and 

drink more electrolyte-containing solutions and soup.





[facility] - Primary Care


[address] [place] [address]


Phone: [phone]


Fax: [phone]








[place] [address]


Phone: [phone]


Fax: [phone]








The following information is given to patients seen in the emergency department 

who are being discharged to home. This information is to outline your options 

for follow-up care. We provide all patients seen in our emergency department 

with a follow-up referral.





The need for follow-up, as well as the timing and circumstances, are variable 

depending upon the specifics of your emergency department visit.





If you don't have a primary care physician on staff, we will provide you with a 

referral. We always advise you to contact your personal physician following an 

emergency department visit to inform them of the circumstance of the visit and 

for follow-up with them and/or the need for any referrals to a consulting 

specialist.





The emergency department will also refer you to a specialist when appropriate. 

This referral assures that you have the opportunity for follow-up care with a 

specialist. All of these measure are taken in an effort to provide you with 

optimal care, which includes your follow-up.





Under all circumstances we always encourage you to contact your private 

physician who remains a resource for coordinating your care. When calling for 

follow-up care, please make the office aware that this follow-up is from your 

recent emergency room visit. If for any reason you are refused follow-up, please

contact the [facility] Emergency Department

at [phone] and asked to speak to the emergency department charge nurse.








Sepsis Event Note (ED)





- Evaluation


Sepsis Screening Result: No Definite Risk





- Focused Exam


Vital Signs: 


                                   Vital Signs











  Temp Pulse Resp BP Pulse Ox


 


 05/24/21 17:35   77  17  89/54 L  99


 


 05/24/21 16:15  36.6 C  98  18  77/50 L  99














- My Orders


Last 24 Hours: 


My Active Orders





05/24/21 17:19


Sodium Chloride 0.9% [Normal Saline] 1,000 ml IV .Bolus 














- Assessment/Plan


Last 24 Hours: 


My Active Orders





05/24/21 17:19


Sodium Chloride 0.9% [Normal Saline] 1,000 ml IV .Bolus

## 2021-05-25 NOTE — EDM.PDOC
ED HPI GENERAL MEDICAL PROBLEM





- General


Chief Complaint: Fever


Stated Complaint: FEVER, CHILLS, VOMITTING


Time Seen by Provider: 05/25/21 19:05


Source of Information: Reports: Patient


History Limitations: Reports: No Limitations





- History of Present Illness


INITIAL COMMENTS - FREE TEXT/NARRATIVE: 





Patient is a 55-year-old female with a recent history of colon cancer and had a 

colostomy placed presents today for fever.  Patient says she is not on any chemo

and had operation to remove part of her colon that the cancer.  Patient states 

that at home if they came back and noted she had a fever over 100.  Patient 

states that has been having body aches.  Patient's been having vomiting which 

has been normal for for the past few weeks.  Patient denies any urinary 

complaints cough abdominal pain rashes or other sources of fever.


  ** Generalized


Pain Score (Numeric/FACES): 7





- Related Data


                                    Allergies











Allergy/AdvReac Type Severity Reaction Status Date / Time


 


codeine Allergy  Hives Verified 05/25/21 19:10


 


pseudoephedrine Allergy  palpitation Verified 05/25/21 19:10





   s  


 


Sulfa (Sulfonamide Allergy  unknown Verified 05/25/21 19:10





Antibiotics)     











Home Meds: 


                                    Home Meds





ClonazePAM [KlonoPIN] 1 mg PO TID PRN 05/31/18 [History]


Umeclidinium Brm/Vilanterol Tr [Anoro Ellipta 62.5-25 MCG] 1 puff IH DAILY 

07/28/20 [History]


Levothyroxine 50 mcg PO ACBREAKFAST 10/26/20 [History]


Amitriptyline [Elavil] 50 mg PO BEDTIME 01/29/21 [History]


Blood Sugar Diagnostic [Glucose Test Strip] 1 each MC Q6H #1 box 05/14/21 [Rx]


Calcium Carbonate [Tums] 1,000 mg PO Q6H 05/14/21 [History]


HYDROmorphone [Dilaudid] 2 mg PO Q4H PRN 05/14/21 [History]


Lancets 1 each MC Q6H #1 box 05/14/21 [Rx]


Loperamide [Imodium] 4 mg PO Q6H PRN 05/14/21 [History]


Loratadine 10 mg PO DAILY 05/14/21 [History]


Mirtazapine 15 mg PO BEDTIME 05/14/21 [History]


Sucralfate 1 gm PO QID 05/14/21 [History]


fentaNYL [Duragesic] 25 mcg TD Q72H 05/14/21 [History]











Past Medical History


HEENT History: Reports: None


Cardiovascular History: Reports: Blood Clots/VTE/DVT, Other (See Below)


Other Cardiovascular History: on Eliquis in multiple places (?)


Respiratory History: Reports: COPD, SOB, Other (See Below)


Other Respiratory History: not on CPAP


Gastrointestinal History: Reports: None


Genitourinary History: Reports: None


OB/GYN History: Reports: Pregnancy


Musculoskeletal History: Reports: None


Neurological History: Reports: None


Psychiatric History: Reports: Anxiety, Depression, PTSD


Endocrine/Metabolic History: Reports: None


Hematologic History: Reports: None


Immunologic History: Reports: None


Oncologic (Cancer) History: Reports: None


Dermatologic History: Reports: None





- Infectious Disease History


Infectious Disease History: Reports: Chicken Pox, Measles, Mumps





- Past Surgical History


Head Surgeries/Procedures: Reports: None


HEENT Surgical History: Reports: Adenoidectomy, Tonsillectomy


Cardiovascular Surgical History: Reports: None


Respiratory Surgical History: Reports: None


GI Surgical History: Reports: Appendectomy, Cholecystectomy, Colostomy, Hernia, 

Abdominal, Other (See Below)


Other GI Surgeries/Procedures: prior colostomy and ileostomy


Female  Surgical History: Reports: Hysterectomy, Salpingo-Oophorectomy


Musculoskeletal Surgical History: Reports: Other (See Below)


Other Musculoskeletal Surgeries/Procedures:: right thumb surgery





Social & Family History





- Family History


Family Medical History: No Pertinent Family History





- Tobacco Use


Tobacco Use Status *Q: Former Tobacco User


Used Tobacco, but Quit: Yes


Month/Year Tobacco Last Used: ?





- Caffeine Use


Caffeine Use: Reports: None





- Recreational Drug Use


Recreational Drug Use: No





ED ROS GENERAL





- Review of Systems


Review Of Systems: See Below


Constitutional: Reports: Fever, Chills


HEENT: Reports: No Symptoms


Respiratory: Reports: No Symptoms


Cardiovascular: Reports: No Symptoms


Endocrine: Reports: No Symptoms


GI/Abdominal: Reports: Vomiting


: Reports: No Symptoms


Musculoskeletal: Reports: No Symptoms


Skin: Reports: No Symptoms


Neurological: Reports: No Symptoms


Psychiatric: Reports: No Symptoms


Hematologic/Lymphatic: Reports: No Symptoms


Immunologic: Reports: No Symptoms





ED EXAM, GENERAL





- Physical Exam


Exam: See Below


Exam Limited By: No Limitations


General Appearance: Alert, WD/WN, No Apparent Distress


Eye Exam: Bilateral Eye: EOMI


Head: Atraumatic


Neck: Normal Inspection, Supple


Respiratory/Chest: No Respiratory Distress, Lungs Clear, Normal Breath Sounds


Cardiovascular: Normal Peripheral Pulses, Regular Rate, Rhythm, No Edema


GI/Abdominal: Normal Bowel Sounds, Soft, Non-Tender, Other (Large colostomy)


Extremities: Normal Inspection


Neurological: Alert, Oriented, CN II-XII Intact





Course





- Vital Signs


Last Recorded V/S: 


                                Last Vital Signs











Temp  100.4 F   05/25/21 21:02


 


Pulse  105 H  05/25/21 22:45


 


Resp  20   05/25/21 22:45


 


BP  82/37 L  05/25/21 22:45


 


Pulse Ox  95   05/25/21 22:45














- Orders/Labs/Meds


Orders: 


                               Active Orders 24 hr











 Category Date Time Status


 


 CULTURE BLOOD [BC] Stat Lab  05/25/21 20:03 Received


 


 CULTURE BLOOD [BC] Stat Lab  05/25/21 20:23 Received


 


 REFLEX LACTIC ACID YES OR NO [CHEM] Routine Lab  05/25/21 21:40 Received


 


 Blood Culture x2 Reflex Set [OM.PC] Stat Oth  05/25/21 19:27 Ordered











Labs: 


                                Laboratory Tests











  05/25/21 05/25/21 05/25/21 Range/Units





  20:03 20:03 20:03 


 


WBC  7.40    (4.0-11.0)  K/uL


 


RBC  3.87 L    (4.30-5.90)  M/uL


 


Hgb  12.2    (12.0-16.0)  g/dL


 


Hct  35.3 L    (36.0-46.0)  %


 


MCV  91.2    (80.0-98.0)  fL


 


MCH  31.5    (27.0-32.0)  pg


 


MCHC  34.6    (31.0-37.0)  g/dL


 


RDW Std Deviation  42.6    (28.0-62.0)  fl


 


RDW Coeff of Jason  13    (11.0-15.0)  %


 


Plt Count  135 L    (150-400)  K/uL


 


MPV  10.00    (7.40-12.00)  fL


 


Neut % (Auto)  91.7 H    (48.0-80.0)  %


 


Lymph % (Auto)  3.8 L    (16.0-40.0)  %


 


Mono % (Auto)  4.5    (0.0-15.0)  %


 


Eos % (Auto)  0.0    (0.0-7.0)  %


 


Baso % (Auto)  0.0    (0.0-1.5)  %


 


Neut # (Auto)  6.8 H    (1.4-5.7)  K/uL


 


Lymph # (Auto)  0.3 L    (0.6-2.4)  K/uL


 


Mono # (Auto)  0.3    (0.0-0.8)  K/uL


 


Eos # (Auto)  0.0    (0.0-0.7)  K/uL


 


Baso # (Auto)  0.0    (0.0-0.1)  K/uL


 


Nucleated RBC %  0.0    /100WBC


 


Nucleated RBCs #  0    K/uL


 


APTT    24.1  (18.6-31.3)  SEC


 


Sodium   123 L   (136-145)  mmol/L


 


Potassium   4.3   (3.5-5.1)  mmol/L


 


Chloride   90 L   ()  mmol/L


 


Carbon Dioxide   25.4   (21.0-32.0)  mmol/L


 


BUN   44 H   (7.0-18.0)  mg/dL


 


Creatinine   1.0   (0.6-1.0)  mg/dL


 


Est Cr Clr Drug Dosing   60.36   mL/min


 


Estimated GFR (MDRD)   57.1   ml/min


 


Glucose   139 H   ()  mg/dL


 


Lactic Acid     (0.4-2.0)  mmol/L


 


Calcium   7.9 L   (8.5-10.1)  mg/dL


 


Phosphorus   1.7 L   (2.6-4.7)  mg/dL


 


Magnesium   1.8   (1.8-2.4)  mg/dL


 


Total Bilirubin   0.5   (0.2-1.0)  mg/dL


 


AST   97 H   (15-37)  IU/L


 


ALT   224 H   (14-63)  IU/L


 


Alkaline Phosphatase   445 H   ()  U/L


 


Total Protein   7.0   (6.4-8.2)  g/dL


 


Albumin   2.7 L   (3.4-5.0)  g/dL


 


Globulin   4.3 H   (2.6-4.0)  g/dL


 


Albumin/Globulin Ratio   0.6 L   (0.9-1.6)  


 


Lipase   92   ()  U/L


 


Urine Color     


 


Urine Appearance     


 


Urine pH     (5.0-8.0)  


 


Ur Specific Gravity     (1.001-1.035)  


 


Urine Protein     (NEGATIVE)  mg/dL


 


Urine Glucose (UA)     (NEGATIVE)  mg/dL


 


Urine Ketones     (NEGATIVE)  mg/dL


 


Urine Occult Blood     (NEGATIVE)  


 


Urine Nitrite     (NEGATIVE)  


 


Urine Bilirubin     (NEGATIVE)  


 


Urine Urobilinogen     (<2.0)  EU/dL


 


Ur Leukocyte Esterase     (NEGATIVE)  


 


Urine RBC     (0-2/HPF)  


 


Urine WBC     (0-5/HPF)  


 


Ur Epithelial Cells     (NONE-FEW)  


 


Urine Bacteria     (NEGATIVE)  


 


Urine Mucus     (NONE-MOD)  


 


Influenza Type A RNA     (NEGATIVE)  


 


Influenza Type B RNA     (NEGATIVE)  


 


SARS-CoV-2 RNA (RODRIGO)     (NEGATIVE)  














  05/25/21 05/25/21 05/25/21 Range/Units





  21:00 21:17 21:35 


 


WBC     (4.0-11.0)  K/uL


 


RBC     (4.30-5.90)  M/uL


 


Hgb     (12.0-16.0)  g/dL


 


Hct     (36.0-46.0)  %


 


MCV     (80.0-98.0)  fL


 


MCH     (27.0-32.0)  pg


 


MCHC     (31.0-37.0)  g/dL


 


RDW Std Deviation     (28.0-62.0)  fl


 


RDW Coeff of Jason     (11.0-15.0)  %


 


Plt Count     (150-400)  K/uL


 


MPV     (7.40-12.00)  fL


 


Neut % (Auto)     (48.0-80.0)  %


 


Lymph % (Auto)     (16.0-40.0)  %


 


Mono % (Auto)     (0.0-15.0)  %


 


Eos % (Auto)     (0.0-7.0)  %


 


Baso % (Auto)     (0.0-1.5)  %


 


Neut # (Auto)     (1.4-5.7)  K/uL


 


Lymph # (Auto)     (0.6-2.4)  K/uL


 


Mono # (Auto)     (0.0-0.8)  K/uL


 


Eos # (Auto)     (0.0-0.7)  K/uL


 


Baso # (Auto)     (0.0-0.1)  K/uL


 


Nucleated RBC %     /100WBC


 


Nucleated RBCs #     K/uL


 


APTT     (18.6-31.3)  SEC


 


Sodium     (136-145)  mmol/L


 


Potassium     (3.5-5.1)  mmol/L


 


Chloride     ()  mmol/L


 


Carbon Dioxide     (21.0-32.0)  mmol/L


 


BUN     (7.0-18.0)  mg/dL


 


Creatinine     (0.6-1.0)  mg/dL


 


Est Cr Clr Drug Dosing     mL/min


 


Estimated GFR (MDRD)     ml/min


 


Glucose     ()  mg/dL


 


Lactic Acid  2.4 H*    (0.4-2.0)  mmol/L


 


Calcium     (8.5-10.1)  mg/dL


 


Phosphorus     (2.6-4.7)  mg/dL


 


Magnesium     (1.8-2.4)  mg/dL


 


Total Bilirubin     (0.2-1.0)  mg/dL


 


AST     (15-37)  IU/L


 


ALT     (14-63)  IU/L


 


Alkaline Phosphatase     ()  U/L


 


Total Protein     (6.4-8.2)  g/dL


 


Albumin     (3.4-5.0)  g/dL


 


Globulin     (2.6-4.0)  g/dL


 


Albumin/Globulin Ratio     (0.9-1.6)  


 


Lipase     ()  U/L


 


Urine Color   YELLOW   


 


Urine Appearance   HAZY   


 


Urine pH   6.0   (5.0-8.0)  


 


Ur Specific Gravity   1.010   (1.001-1.035)  


 


Urine Protein   NEGATIVE   (NEGATIVE)  mg/dL


 


Urine Glucose (UA)   NEGATIVE   (NEGATIVE)  mg/dL


 


Urine Ketones   NEGATIVE   (NEGATIVE)  mg/dL


 


Urine Occult Blood   NEGATIVE   (NEGATIVE)  


 


Urine Nitrite   NEGATIVE   (NEGATIVE)  


 


Urine Bilirubin   NEGATIVE   (NEGATIVE)  


 


Urine Urobilinogen   0.2   (<2.0)  EU/dL


 


Ur Leukocyte Esterase   MODERATE H   (NEGATIVE)  


 


Urine RBC   0-2   (0-2/HPF)  


 


Urine WBC   3-6   (0-5/HPF)  


 


Ur Epithelial Cells   MODERATE   (NONE-FEW)  


 


Urine Bacteria   FEW   (NEGATIVE)  


 


Urine Mucus   LIGHT   (NONE-MOD)  


 


Influenza Type A RNA    NEGATIVE  (NEGATIVE)  


 


Influenza Type B RNA    NEGATIVE  (NEGATIVE)  


 


SARS-CoV-2 RNA (RODRIGO)    NEGATIVE  (NEGATIVE)  














  05/25/21 Range/Units





  22:04 


 


WBC   (4.0-11.0)  K/uL


 


RBC   (4.30-5.90)  M/uL


 


Hgb   (12.0-16.0)  g/dL


 


Hct   (36.0-46.0)  %


 


MCV   (80.0-98.0)  fL


 


MCH   (27.0-32.0)  pg


 


MCHC   (31.0-37.0)  g/dL


 


RDW Std Deviation   (28.0-62.0)  fl


 


RDW Coeff of Jason   (11.0-15.0)  %


 


Plt Count   (150-400)  K/uL


 


MPV   (7.40-12.00)  fL


 


Neut % (Auto)   (48.0-80.0)  %


 


Lymph % (Auto)   (16.0-40.0)  %


 


Mono % (Auto)   (0.0-15.0)  %


 


Eos % (Auto)   (0.0-7.0)  %


 


Baso % (Auto)   (0.0-1.5)  %


 


Neut # (Auto)   (1.4-5.7)  K/uL


 


Lymph # (Auto)   (0.6-2.4)  K/uL


 


Mono # (Auto)   (0.0-0.8)  K/uL


 


Eos # (Auto)   (0.0-0.7)  K/uL


 


Baso # (Auto)   (0.0-0.1)  K/uL


 


Nucleated RBC %   /100WBC


 


Nucleated RBCs #   K/uL


 


APTT   (18.6-31.3)  SEC


 


Sodium  124 L  (136-145)  mmol/L


 


Potassium  4.1  (3.5-5.1)  mmol/L


 


Chloride  95 L  ()  mmol/L


 


Carbon Dioxide  22.9  (21.0-32.0)  mmol/L


 


BUN  39 H  (7.0-18.0)  mg/dL


 


Creatinine  0.9  (0.6-1.0)  mg/dL


 


Est Cr Clr Drug Dosing  67.07  mL/min


 


Estimated GFR (MDRD)  > 60.0  ml/min


 


Glucose  133 H  ()  mg/dL


 


Lactic Acid   (0.4-2.0)  mmol/L


 


Calcium  6.9 L  (8.5-10.1)  mg/dL


 


Phosphorus   (2.6-4.7)  mg/dL


 


Magnesium   (1.8-2.4)  mg/dL


 


Total Bilirubin   (0.2-1.0)  mg/dL


 


AST   (15-37)  IU/L


 


ALT   (14-63)  IU/L


 


Alkaline Phosphatase   ()  U/L


 


Total Protein   (6.4-8.2)  g/dL


 


Albumin   (3.4-5.0)  g/dL


 


Globulin   (2.6-4.0)  g/dL


 


Albumin/Globulin Ratio   (0.9-1.6)  


 


Lipase   ()  U/L


 


Urine Color   


 


Urine Appearance   


 


Urine pH   (5.0-8.0)  


 


Ur Specific Gravity   (1.001-1.035)  


 


Urine Protein   (NEGATIVE)  mg/dL


 


Urine Glucose (UA)   (NEGATIVE)  mg/dL


 


Urine Ketones   (NEGATIVE)  mg/dL


 


Urine Occult Blood   (NEGATIVE)  


 


Urine Nitrite   (NEGATIVE)  


 


Urine Bilirubin   (NEGATIVE)  


 


Urine Urobilinogen   (<2.0)  EU/dL


 


Ur Leukocyte Esterase   (NEGATIVE)  


 


Urine RBC   (0-2/HPF)  


 


Urine WBC   (0-5/HPF)  


 


Ur Epithelial Cells   (NONE-FEW)  


 


Urine Bacteria   (NEGATIVE)  


 


Urine Mucus   (NONE-MOD)  


 


Influenza Type A RNA   (NEGATIVE)  


 


Influenza Type B RNA   (NEGATIVE)  


 


SARS-CoV-2 RNA (RODRIGO)   (NEGATIVE)  











Meds: 


Medications














Discontinued Medications














Generic Name Dose Route Start Last Admin





  Trade Name Austen  PRN Reason Stop Dose Admin


 


Acetaminophen  650 mg  05/25/21 20:18  05/25/21 20:48





  Acetaminophen 325 Mg Tab  PO  05/25/21 20:19  650 mg





  NOW ONE   Administration


 


Hydromorphone HCl  1 mg  05/25/21 19:26  05/25/21 19:43





  Hydromorphone 1 Mg/Ml Syringe  IVPUSH  05/25/21 19:27  1 mg





  ONETIME ONE   Administration


 


Sodium Chloride  1,000 mls @ 999 mls/hr  05/25/21 19:26  05/25/21 19:43





  Normal Saline  IV  05/25/21 20:26  999 mls/hr





  .BOLUS ONE   Administration


 


Sodium Chloride  1,000 mls @ 999 mls/hr  05/25/21 19:28  05/25/21 20:48





  Normal Saline  IV  05/25/21 20:28  999 mls/hr





  .BOLUS ONE   Administration


 


Ibuprofen  800 mg  05/25/21 20:54  05/25/21 21:03





  Ibuprofen 800 Mg Tab  PO  05/25/21 20:55  Not Given





  ONETIME ONE  


 


Ibuprofen  800 mg  05/25/21 21:00  05/25/21 21:02





  Ibuprofen 400 Mg Tab  PO  05/25/21 21:01  800 mg





  ONETIME ONE   Administration


 


Ibuprofen  Confirm  05/25/21 21:00  05/25/21 21:07





  Ibuprofen 400 Mg Tab  Administered  05/25/21 21:01  Not Given





  Dose  





  800 mg  





  .ROUTE  





  .STK-MED ONE  


 


Ondansetron HCl  4 mg  05/25/21 19:26  05/25/21 19:43





  Ondansetron 4 Mg/2 Ml Sdv  IVPUSH  05/25/21 19:27  4 mg





  ONETIME ONE   Administration














- Re-Assessments/Exams


Free Text/Narrative Re-Assessment/Exam: 





05/25/21 23:20


We had extensive conversation with patient about possible admission.  Patient is

 adamant that she would like to go home instead.  Patient sodium is around 124 

but she is not symptomatic.  Patient is getting TPN feeds and states that she 

has had a new bed, tomorrow to help adjust her sodium issues as it was low last 

time which may help improve her hyponatremia.  We also spoke to hospitalist for 

possible admission and recommended patient not want to stay the patient can 

follow-up in a day or to have sodium results checked.  Patient has a family by 

the bedside they both agree the patient is okay to go home and will bring 

patient back if symptoms become worse or comes confused procedures.  Patient 

understands risks of leaving.





Departure





- Departure


Time of Disposition: 23:21


Disposition: Home, Self-Care 01


Condition: Good


Clinical Impression: 


 Fever, unknown origin








- Discharge Information


*PRESCRIPTION DRUG MONITORING PROGRAM REVIEWED*: Not Applicable


*COPY OF PRESCRIPTION DRUG MONITORING REPORT IN PATIENT SHERLY: Not Applicable


Instructions:  Fever, Adult, Easy-to-Read


Referrals: 


Quinton Lorenzo MD [Primary Care Provider] - 


Forms:  ED Department Discharge


Additional Instructions: 


The following information is given to patients seen in the emergency department 

who are being discharged to home. This information is to outline your options 

for follow-up care. We provide all patients seen in our emergency department 

with a follow-up referral.





The need for follow-up, as well as the timing and circumstances, are variable 

depending upon the specifics of your emergency department visit.





If you don't have a primary care physician on staff, we will provide you with a 

referral. We always advise you to contact your personal physician following an 

emergency department visit to inform them of the circumstance of the visit and 

for follow-up with them and/or the need for any referrals to a consulting 

specialist.





The emergency department will also refer you to a specialist when appropriate. 

This referral assures that you have the opportunity for follow-up care with a 

specialist. All of these measure are taken in an effort to provide you with 

optimal care, which includes your follow-up.





Under all circumstances we always encourage you to contact your private 

physician who remains a resource for coordinating your care. When calling for 

follow-up care, please make the office aware that this follow-up is from your 

recent emergency room visit. If for any reason you are refused follow-up, please

contact the [facility] Emergency Department

at [phone] and asked to speak to the emergency department charge nurse.





Please follow up with your primary care physician. If you do not have a primary 

care physician, see below:


[facility]


[address] [place] [id number] [phone]





My [facility]


[address] [place] [address] [phone]








You were seen today for fever from unknown cause.  We did x-ray labs and check 

the urine as well as some blood cultures.  We do not have a direct source of the

fever.  The blood cultures were tender next few days.  Your sodium was also 

found to be low but she states that you have a new TPN order, and that we will 

address your low sodium.  We would like to have admitted to you but you would 

prefer to go home at the speaking to the hospitalist we can have you follow-up 

and recheck your labs in the next day or 2.  Again if you have any confusion 

seizures or other symptoms please return to the ED immediately.








Critical Care Note





- Critical Care Note


Total Time (mins): 40


Comments: 





Critical Care Procedure Note


Authorized and Performed by: Dr. Wolfe


Total critical care time: Approximately  





Due to a high probability of clinically significant, life threatening 

deterioration, the patient required my highest level of preparedness to 

intervene emergently and I personally spent this critical care time directly and

personally managing the patient. This critical care time included obtaining a 

history; examining the patient; pulse oximetry; ordering and review of studies; 

arranging urgent treatment with development of a management plan; evaluation of 

patient's response to treatment; frequent reassessment; and, discussions with 

other providers.


This critical care time was performed to assess and manage the high probability 

of imminent, life-threatening deterioration that could result in multi-organ 

failure. It was exclusive of separately billable procedures and treating other 

patients and teaching time.





Sepsis Event Note (ED)





- Evaluation


Sepsis Screening Result: No Definite Risk





- Focused Exam


Vital Signs: 


                                   Vital Signs











  Temp Temp Pulse Resp BP Pulse Ox


 


 05/25/21 22:45    105 H  20  82/37 L  95


 


 05/25/21 21:02  100.4 F     


 


 05/25/21 20:48  100.4 F     


 


 05/25/21 19:06   100.6 F  115 H  20  107/62  99














- My Orders


Last 24 Hours: 


My Active Orders





05/25/21 19:27


Blood Culture x2 Reflex Set [OM.PC] Stat 





05/25/21 20:03


CULTURE BLOOD [BC] Stat 





05/25/21 20:23


CULTURE BLOOD [BC] Stat 





05/25/21 21:40


REFLEX LACTIC ACID YES OR NO [CHEM] Routine 














- Assessment/Plan


Last 24 Hours: 


My Active Orders





05/25/21 19:27


Blood Culture x2 Reflex Set [OM.PC] Stat 





05/25/21 20:03


CULTURE BLOOD [BC] Stat 





05/25/21 20:23


CULTURE BLOOD [BC] Stat 





05/25/21 21:40


REFLEX LACTIC ACID YES OR NO [CHEM] Routine 











Plan: 





Patient is a 55-year-old female who presents today for fever body aches.  

Patient has no clear source of fever.  Will obtain blood cultures labs x-ray UA 

and reassess.

## 2021-05-25 NOTE — CR
INDICATION:



Cough. Fever.



COMPARISON:



01/29/2021.



FINDINGS:



A portable AP view of the chest was obtained. The cardiac silhouette and 

pulmonary vasculature are within normal limits. The lungs are clear 

bilaterally.



IMPRESSION:



No evidence of acute pulmonary disease.



Dictated by Henry Lira MD @ 5/25/2021 8:15:26 PM



Signed by Dr. Henry Lira @ May 25 2021  8:15PM

## 2021-05-26 NOTE — EDM.PDOC
ED HPI GENERAL MEDICAL PROBLEM





- General


Chief Complaint: Possible Sepsis


Stated Complaint: EMS ARRIVAL


Time Seen by Provider: 05/26/21 10:48


Source of Information: Reports: Patient, EMS





- History of Present Illness


INITIAL COMMENTS - FREE TEXT/NARRATIVE: 





55-year-old female brought by EMS after evaluation by home health nurse due to 

concern for infection/sepsis and vomiting.  The patient has a complicated recent

past medical history including abdominal surgery with failure/infection and has 

a PICC line with continuous TPN.  She recently had blood cultures collected 

yesterday during ER visit here, which came back positive for gram-positive cocci

in singles, pairs and chains.  Today she has been vomiting.  Per EMS she was 

hypotensive and tachycardic.  The patient has not had a fever.  She has mild 

chronic dyspnea and takes 2 L of oxygen by nasal cannula and does not report any

worsening respiratory symptoms.  No cough.  Her only complaints currently are 

abdominal pain and back pain, both of which are chronic for months, for which 

she takes fentanyl and has a fentanyl patch in place.  She is requesting 

Dilaudid by name.  She did receive Zofran by EMS.


No other associated symptoms.  The patient reports no alleviating or aggravating

factors.


Onset: Gradual, Other (Last few weeks)


Onset Date: 05/26/21 (Acute on chronic)


Location: Reports: Abdomen


Improves with: Reports: None


  ** abdomen


Pain Score (Numeric/FACES): 6





- Related Data


                                    Allergies











Allergy/AdvReac Type Severity Reaction Status Date / Time


 


codeine Allergy  Hives Verified 05/26/21 10:51


 


pseudoephedrine Allergy  palpitation Verified 05/26/21 10:51





   s  


 


Sulfa (Sulfonamide Allergy  unknown Verified 05/26/21 10:51





Antibiotics)     











Home Meds: 


                                    Home Meds





ClonazePAM [KlonoPIN] 1 mg PO TID PRN 05/31/18 [History]


Umeclidinium Brm/Vilanterol Tr [Anoro Ellipta 62.5-25 MCG] 1 puff IH DAILY 

07/28/20 [History]


Levothyroxine 50 mcg PO ACBREAKFAST 10/26/20 [History]


Amitriptyline [Elavil] 50 mg PO BEDTIME 01/29/21 [History]


Blood Sugar Diagnostic [Glucose Test Strip] 1 each MC Q6H #1 box 05/14/21 [Rx]


Calcium Carbonate [Tums] 1,000 mg PO Q6H 05/14/21 [History]


HYDROmorphone [Dilaudid] 2 mg PO Q4H PRN 05/14/21 [History]


Lancets 1 each MC Q6H #1 box 05/14/21 [Rx]


Loperamide [Imodium] 4 mg PO Q6H PRN 05/14/21 [History]


Loratadine 10 mg PO DAILY 05/14/21 [History]


Mirtazapine 15 mg PO BEDTIME 05/14/21 [History]


Sucralfate 1 gm PO QID 05/14/21 [History]


fentaNYL [Duragesic] 25 mcg TD Q72H 05/14/21 [History]











Past Medical History


HEENT History: Reports: None


Cardiovascular History: Reports: Blood Clots/VTE/DVT, Other (See Below)


Other Cardiovascular History: on Eliquis in multiple places (?)


Respiratory History: Reports: COPD, SOB, Other (See Below)


Other Respiratory History: not on CPAP


Gastrointestinal History: Reports: None


Genitourinary History: Reports: None


OB/GYN History: Reports: Pregnancy


Musculoskeletal History: Reports: None


Neurological History: Reports: None


Psychiatric History: Reports: Anxiety, Depression, PTSD


Endocrine/Metabolic History: Reports: None


Hematologic History: Reports: None


Immunologic History: Reports: None


Oncologic (Cancer) History: Reports: None


Dermatologic History: Reports: None





- Infectious Disease History


Infectious Disease History: Reports: Chicken Pox, Measles, Mumps





- Past Surgical History


Head Surgeries/Procedures: Reports: None


HEENT Surgical History: Reports: Adenoidectomy, Tonsillectomy


Cardiovascular Surgical History: Reports: None


Respiratory Surgical History: Reports: None


GI Surgical History: Reports: Appendectomy, Cholecystectomy, Colostomy, Hernia, 

Abdominal, Other (See Below)


Other GI Surgeries/Procedures: prior colostomy and ileostomy


Female  Surgical History: Reports: Hysterectomy, Salpingo-Oophorectomy


Musculoskeletal Surgical History: Reports: Other (See Below)


Other Musculoskeletal Surgeries/Procedures:: right thumb surgery





Social & Family History





- Family History


Family Medical History: No Pertinent Family History





- Tobacco Use


Tobacco Use Status *Q: Current Every Day Tobacco User





- Caffeine Use


Caffeine Use: Reports: None





ED ROS GENERAL





- Review of Systems


Review Of Systems: See Below


Constitutional: Reports: Malaise, Weakness, Fatigue


HEENT: Reports: No Symptoms


Respiratory: Reports: Shortness of Breath (Chronic, unchanged, uses 2 L nasal 

cannula at baseline)


Cardiovascular: Reports: No Symptoms


GI/Abdominal: Reports: Abdominal Pain


Musculoskeletal: Reports: Back Pain (Chronic, unchanged)





ED EXAM, GENERAL





- Physical Exam


Exam: See Below


General Appearance: Alert, Other (Pale, uncomfortable, appears chronically ill)


Throat/Mouth: Other (Dry mucous membranes)


Head: Atraumatic, Normocephalic


Neck: Normal Inspection, Supple


Respiratory/Chest: No Respiratory Distress, No Accessory Muscle Use


Cardiovascular: Tachycardia


GI/Abdominal: Soft, Other (Open wound over abdomen with visible ileostomy.  No 

pallor.  Good coloration.  No purulent discharge.  Some liquid stool visible in 

the bag.  Abdomen soft but mildly diffusely tender in the abdomen.  No 

rigidity.)


 (Female) Exam: Deferred


Rectal (Female) Exam: Deferred


Back Exam: Full Range of Motion


Extremities: Normal Inspection


Neurological: Alert, Oriented, Normal Cognition


Psychiatric: Normal Affect, Normal Mood


Skin Exam: Warm, Dry, Other (Pale)


  ** #1 Interpretation


EKG Date: 05/26/21


Time: 11:09


Rhythm: NSR


Rate (Beats/Min): 100


Axis: RAD-Right Axis Deviation


P-Wave: Present


QRS: Normal


ST-T: Normal


QT: Normal





Course





- Vital Signs


Text/Narrative:: 





Patient pale, tachycardic, hypotensive on arrival here.  Recent blood cultures 

from yesterday came back positive for gram-positive cocci.  Patient with open 

abdominal wound with ileostomy.  On TPN.  Multiple possible sources of 

infection.  Will cover with Zosyn and vancomycin.


Sepsis order set utilized.


Hypotensive on arrival here.  Will be given IV fluids.


Hypoxic on arrival here, however the patient has a baseline 2 L O2 dependency 

and oxygen was readministered as she arrived via EMS without oxygen 

administration.


She is not having any dyspnea currently and chest x-ray was performed during her

 ED visit yesterday evening and was normal.  Therefore will not be repeated at 

this time.


As the patient currently has TPN and we are unable to run TPN in this hospital 

per discussion with the hospitalist, the patient will require transfer to the 

nearest available facility.


Last Recorded V/S: 


                                Last Vital Signs











Temp  99.8 F   05/26/21 16:12


 


Pulse  66   05/26/21 18:16


 


Resp  20   05/26/21 18:16


 


BP  80/41 L  05/26/21 18:16


 


Pulse Ox  2 L  05/26/21 18:16














- Orders/Labs/Meds


Orders: 


                               Active Orders 24 hr











 Category Date Time Status


 


 Saline Lock Insert [OM.PC] Stat Oth  05/26/21 13:11 Ordered


 


 Saline Lock Insert [OM.PC] Stat Oth  05/26/21 14:51 Ordered











Labs: 


                                Laboratory Tests











  05/26/21 05/26/21 05/26/21 Range/Units





  11:24 11:24 11:30 


 


WBC  6.40    (4.0-11.0)  K/uL


 


RBC  3.40 L    (4.30-5.90)  M/uL


 


Hgb  10.7 L    (12.0-16.0)  g/dL


 


Hct  31.4 L    (36.0-46.0)  %


 


MCV  92.4    (80.0-98.0)  fL


 


MCH  31.5    (27.0-32.0)  pg


 


MCHC  34.1    (31.0-37.0)  g/dL


 


RDW Std Deviation  44.0    (28.0-62.0)  fl


 


RDW Coeff of Jason  13    (11.0-15.0)  %


 


Plt Count  103 L    (150-400)  K/uL


 


MPV  10.10    (7.40-12.00)  fL


 


Neut % (Auto)  69.8    (48.0-80.0)  %


 


Lymph % (Auto)  15.0 L    (16.0-40.0)  %


 


Mono % (Auto)  14.8    (0.0-15.0)  %


 


Eos % (Auto)  0.2    (0.0-7.0)  %


 


Baso % (Auto)  0.2    (0.0-1.5)  %


 


Neut # (Auto)  4.5    (1.4-5.7)  K/uL


 


Lymph # (Auto)  1.0    (0.6-2.4)  K/uL


 


Mono # (Auto)  1.0 H    (0.0-0.8)  K/uL


 


Eos # (Auto)  0.0    (0.0-0.7)  K/uL


 


Baso # (Auto)  0.0    (0.0-0.1)  K/uL


 


Nucleated RBC %  0.0    /100WBC


 


Nucleated RBCs #  0    K/uL


 


Sodium   128 L   (136-145)  mmol/L


 


Potassium   4.6   (3.5-5.1)  mmol/L


 


Chloride   97 L   ()  mmol/L


 


Carbon Dioxide   24.9   (21.0-32.0)  mmol/L


 


BUN   41 H   (7.0-18.0)  mg/dL


 


Creatinine   0.9   (0.6-1.0)  mg/dL


 


Est Cr Clr Drug Dosing   67.07   mL/min


 


Estimated GFR (MDRD)   > 60.0   ml/min


 


Glucose   101   ()  mg/dL


 


Lactic Acid    1.3  (0.4-2.0)  mmol/L


 


Calcium   7.5 L   (8.5-10.1)  mg/dL


 


Total Bilirubin   0.6   (0.2-1.0)  mg/dL


 


AST   70 H   (15-37)  IU/L


 


ALT   169 H   (14-63)  IU/L


 


Alkaline Phosphatase   313 H   ()  U/L


 


Total Protein   6.0 L   (6.4-8.2)  g/dL


 


Albumin   2.2 L   (3.4-5.0)  g/dL


 


Globulin   3.8   (2.6-4.0)  g/dL


 


Albumin/Globulin Ratio   0.6 L   (0.9-1.6)  


 


Urine Color     


 


Urine Appearance     


 


Urine pH     (5.0-8.0)  


 


Ur Specific Gravity     (1.001-1.035)  


 


Urine Protein     (NEGATIVE)  mg/dL


 


Urine Glucose (UA)     (NEGATIVE)  mg/dL


 


Urine Ketones     (NEGATIVE)  mg/dL


 


Urine Occult Blood     (NEGATIVE)  


 


Urine Nitrite     (NEGATIVE)  


 


Urine Bilirubin     (NEGATIVE)  


 


Urine Urobilinogen     (<2.0)  EU/dL


 


Ur Leukocyte Esterase     (NEGATIVE)  


 


Urine RBC     (0-2/HPF)  


 


Urine WBC     (0-5/HPF)  


 


Ur Squamous Epith Cells     


 


Urine Bacteria     (NEGATIVE)  


 


Urine Mucus     (NONE-MOD)  














  05/26/21 Range/Units





  13:02 


 


WBC   (4.0-11.0)  K/uL


 


RBC   (4.30-5.90)  M/uL


 


Hgb   (12.0-16.0)  g/dL


 


Hct   (36.0-46.0)  %


 


MCV   (80.0-98.0)  fL


 


MCH   (27.0-32.0)  pg


 


MCHC   (31.0-37.0)  g/dL


 


RDW Std Deviation   (28.0-62.0)  fl


 


RDW Coeff of Jason   (11.0-15.0)  %


 


Plt Count   (150-400)  K/uL


 


MPV   (7.40-12.00)  fL


 


Neut % (Auto)   (48.0-80.0)  %


 


Lymph % (Auto)   (16.0-40.0)  %


 


Mono % (Auto)   (0.0-15.0)  %


 


Eos % (Auto)   (0.0-7.0)  %


 


Baso % (Auto)   (0.0-1.5)  %


 


Neut # (Auto)   (1.4-5.7)  K/uL


 


Lymph # (Auto)   (0.6-2.4)  K/uL


 


Mono # (Auto)   (0.0-0.8)  K/uL


 


Eos # (Auto)   (0.0-0.7)  K/uL


 


Baso # (Auto)   (0.0-0.1)  K/uL


 


Nucleated RBC %   /100WBC


 


Nucleated RBCs #   K/uL


 


Sodium   (136-145)  mmol/L


 


Potassium   (3.5-5.1)  mmol/L


 


Chloride   ()  mmol/L


 


Carbon Dioxide   (21.0-32.0)  mmol/L


 


BUN   (7.0-18.0)  mg/dL


 


Creatinine   (0.6-1.0)  mg/dL


 


Est Cr Clr Drug Dosing   mL/min


 


Estimated GFR (MDRD)   ml/min


 


Glucose   ()  mg/dL


 


Lactic Acid   (0.4-2.0)  mmol/L


 


Calcium   (8.5-10.1)  mg/dL


 


Total Bilirubin   (0.2-1.0)  mg/dL


 


AST   (15-37)  IU/L


 


ALT   (14-63)  IU/L


 


Alkaline Phosphatase   ()  U/L


 


Total Protein   (6.4-8.2)  g/dL


 


Albumin   (3.4-5.0)  g/dL


 


Globulin   (2.6-4.0)  g/dL


 


Albumin/Globulin Ratio   (0.9-1.6)  


 


Urine Color  YELLOW  


 


Urine Appearance  SLT CLOUDY  


 


Urine pH  6.0  (5.0-8.0)  


 


Ur Specific Gravity  1.010  (1.001-1.035)  


 


Urine Protein  NEGATIVE  (NEGATIVE)  mg/dL


 


Urine Glucose (UA)  NEGATIVE  (NEGATIVE)  mg/dL


 


Urine Ketones  NEGATIVE  (NEGATIVE)  mg/dL


 


Urine Occult Blood  TRACE-INTACT H  (NEGATIVE)  


 


Urine Nitrite  NEGATIVE  (NEGATIVE)  


 


Urine Bilirubin  NEGATIVE  (NEGATIVE)  


 


Urine Urobilinogen  0.2  (<2.0)  EU/dL


 


Ur Leukocyte Esterase  SMALL H  (NEGATIVE)  


 


Urine RBC  0-2  (0-2/HPF)  


 


Urine WBC  4-8  (0-5/HPF)  


 


Ur Squamous Epith Cells  FEW  


 


Urine Bacteria  FEW  (NEGATIVE)  


 


Urine Mucus  LIGHT  (NONE-MOD)  











Meds: 


Medications














Discontinued Medications














Generic Name Dose Route Start Last Admin





  Trade Name Freq  PRN Reason Stop Dose Admin


 


Acetaminophen  1,000 mg  05/26/21 15:08  05/26/21 15:13





  Acetaminophen 500 Mg Tab  PO  05/26/21 15:09  Not Given





  ONETIME ONE  


 


Fentanyl  Confirm  05/26/21 18:24 





  Fentanyl 50 Mcg/Ml Sdv  Administered  05/26/21 18:25 





  Dose  





  50 mcg  





  .ROUTE  





  .STK-MED ONE  


 


Hydromorphone HCl  0.25 mg  05/26/21 11:57  05/26/21 12:08





  Hydromorphone 2 Mg/Ml Syringe  IVPUSH  05/26/21 11:58  0.25 mg





  ONETIME ONE   Administration


 


Hydromorphone HCl  0.5 mg  05/26/21 14:51  05/26/21 15:05





  Hydromorphone 1 Mg/Ml Syringe  IVPUSH  05/26/21 14:52  0.5 mg





  ONETIME ONE   Administration


 


Piperacillin Sod/Tazobactam  100 mls @ 100 mls/hr  05/26/21 10:48  05/26/21 

11:48





  Sod 4.5 gm/ Sodium Chloride  IV  05/26/21 11:47  100 mls/hr





  STAT ONE   Administration


 


Sodium Chloride  1,000 mls @ 999 mls/hr  05/26/21 10:48  05/26/21 11:00





  Normal Saline  IV  05/26/21 11:48  999 mls/hr





  BOLUS ONE   Administration





  Protocol  


 


Vancomycin HCl 1 gm/ Sodium  250 mls @ 250 mls/hr  05/26/21 11:30  05/26/21 

12:09





  Chloride  IV  05/26/21 12:29  250 mls/hr





  ONETIME ONE   Administration


 


Sodium Chloride  500 mls @ 999 mls/hr  05/26/21 16:00  05/26/21 16:45





  Normal Saline  IV   999 mls/hr





  .BOLUS [place]   Administration


 


Norepinephrine Bitartrate  Confirm  05/26/21 18:03 





  Norepinephr-0.9% Nacl 4 Mg/250  Administered  05/26/21 18:04 





  Dose  





  4 mg in 250 mls @ as directed  





  IV  





  .STK-MED ONE  


 


Ibuprofen  800 mg  05/26/21 15:07  05/26/21 15:13





  Ibuprofen 800 Mg Tab  PO  05/26/21 15:08  Not Given





  ONETIME ONE  


 


Ibuprofen  600 mg  05/26/21 15:08  05/26/21 15:12





  Ibuprofen 600 Mg Tab  PO  05/26/21 15:09  600 mg





  ONETIME ONE   Administration


 


Iopamidol  100 ml  05/26/21 21:31  05/26/21 21:32





  Iopamidol 755 Mg/Ml 100 Ml Bottle  IVPUSH  05/26/21 21:32  100 ml





  ONETIME ONE   Administration


 


Ondansetron HCl  4 mg  05/26/21 14:51  05/26/21 15:05





  Ondansetron 4 Mg/2 Ml Sdv  IVPUSH  05/26/21 14:52  4 mg





  ONETIME ONE   Administration


 


Sodium Chloride  10 ml  05/26/21 10:48  05/26/21 11:00





  Sodium Chloride 0.9% 10 Ml Syringe  FLUSH   10 ml





  ASDIRECTED PRN   Administration





  Keep Vein Open  


 


Sodium Chloride  2.5 ml  05/26/21 10:48  05/26/21 11:00





  Sodium Chloride 0.9% 2.5 Ml Syringe  FLUSH   2.5 ml





  ASDIRECTED PRN   Administration





  Keep Vein Open  


 


Sodium Chloride  10 ml  05/26/21 13:11 





  Sodium Chloride 0.9% 10 Ml Syringe  FLUSH  





  ASDIRECTED PRN  





  Keep Vein Open  


 


Sodium Chloride  2.5 ml  05/26/21 13:11 





  Sodium Chloride 0.9% 2.5 Ml Syringe  FLUSH  





  ASDIRECTED PRN  





  Keep Vein Open  


 


Sodium Chloride  10 ml  05/26/21 14:51 





  Sodium Chloride 0.9% 10 Ml Syringe  FLUSH  





  ASDIRECTED PRN  





  Keep Vein Open  


 


Sodium Chloride  2.5 ml  05/26/21 14:51 





  Sodium Chloride 0.9% 2.5 Ml Syringe  FLUSH  





  ASDIRECTED PRN  





  Keep Vein Open  














- Re-Assessments/Exams


Free Text/Narrative Re-Assessment/Exam: 





05/26/21 11:58


The patient is reporting ongoing pain.  Her blood pressure is now improved to 

the point that she can receive IV pain medications.  Dilaudid will be given.  

Discussed need for transfer with the patient and the discussion with the h

ospitalist.  The patient reports that she had previously been hospitalized at 

[place], however it was at a long-term acute care facility/rehab and not a 

hospital with acute admission facilities.  Discussed with the patient that her 

level of care required especially for her current sepsis which may be related to

 the PICC line is above the level of an acute care facility and she will need an

 actual hospital.





05/26/21 14:50


Patient is feeling nauseated and pain again.  Requesting cold glass of water.  

Will defer water while CT is still pending but will order Zofran and pain 

medications.


Free Text/Narrative Re-Assessment/Exam: 





05/26/21 12:06


Long discussion with the patient and her  at the bedside in terms of the 

recommendations made by her primary care physician, Dr. Finley for transfer to 

[facility] for hospitalization, as he confirmed that the hospital that she 

was previously at in [place] is at a long-term acute care facility and not in 

acute hospital.  They are unable to admit the patient there for this current 

high level of care that she requires.  I discussed this at length with them and 

recommendation for transfer to the nearest appropriate facility which is [facility], and also is where she had her surgery, and where her records are.  They 

agree to this.


05/26/21 15:12


Patient developed a fever.  She requested ibuprofen.  Tylenol and ibuprofen were

 ordered, however the patient reports she has been told in the past she should 

not have Tylenol, therefore Tylenol was canceled.  Awaiting CT scan results.


05/26/21 15:34


I reassessed the patient and she reports she is feeling much better.  The nausea

 is improved.  Updated the patient and her spouse at the bedside.  Her  

brought in the Intralipid that she is supposed to start after completion of her 

IV TPN at 7 PM tonight.  CT scan is still pending








05/26/21 15:49





Case discussed with Dr. Kelley at [facility], ER physician for transfer 

acceptance.  He accepts the patient for transfer.





05/26/21 17:13


Reassessed the patient.  Discussed CT results and pending transfer/ETA of EMS 

for transfer with the patient.  She is in no acute distress but is reporting 

that she is in pain and would like additional pain medications.  Her repeat 

blood pressure is listed on the auto blood pressure cuff as in the 60s, however 

patient is speaking clearly with an fully preserved and intact mental status and

 therefore unlikely.  Manual blood pressure will be checked and additional IV 

fluid bolus will be given.


05/27/21 11:52


Additional chart completion and reevaluations are being completed after patient 

transfer due to prolonged downtime and inability to access [employer] for EMR 

documentation during patient's ED stay.





All reevaluations occurred May 26, 2021


5:25 PM: Blood pressure is still low although in the 70s systolic on manual 

blood pressure check.  I reassessed the patient and discussed recommendation for

 flight transfer due to low/worsening blood pressure, prolonged delay in EMS 

transfer, transfer still pending and ACLS team not due for 1.5 more hours.  The 

patient agrees to air/helicopter transfer.  The patient is alert, sitting up in 

bed and speaking clearly.  She did request assistance with emptying her 

abdominal wall wound/ostomy bag.  I assisted her as well as the patient's nurse 

and the patient was able to stand, bear weight and ambulate across the room to 

the commode without becoming dizzy, weak or any other symptoms of the low blood 

pressure.





5:46 PM: I discussed the case with the [facility] 1 call line and Dr. Haro,

 the ER physician on now at [facility] in terms of the patient's change of 

status, ongoing low blood pressures and now will be transferred via air 

ambulance/helicopter.  He agrees with this plan.  We discussed plan for starting

 pressors.  He requested to start phenylephrine.  We also discussed PICC line 

and plan to culture and pull the PICC line versus leave PICC line in for 

transfer.  At this point he requests that we leave the PICC line in for transfer

 so the patient has continued central venous access and they will pull and 

culture it when she arrives at [facility] and obtain alternate access.





6:00 PM: Due to ongoing computer downtime there is a delay to obtain 

phenylephrine in the emergency department here today and the flight team is here

 and ready to depart with the patient.  Levophed is able to be overridden and 

pulled directly from the Pyxis and therefore will start this now to prevent 

further delay in patient's care and any further delay in transfer.





6:06 PM.  I reassessed the patient, she is in no acute distress.  She reports 

she is feeling fine and is still alert and speaking clearly.  Her blood pressure

 is now improved to the 80s systolic after Levophed has now just been started.  

Levophed will be continued.





6:30 PM.  Transfer team has loaded the patient and ready to depart.  The patient

 is seated upright and in no distress speaking clearly and alert at time of 

discharge from this facility.











Departure





- Departure


Time of Disposition: 15:49


Disposition: DC/Tfer to [facility] 02


Clinical Impression: 


 Sepsis, Gram-positive cocci bacteremia, Septic shock








- Discharge Information


Referrals: 


Quinton Lorenzo MD [Primary Care Provider] - 


Forms:  ED Department Discharge





Critical Care Note





- Critical Care Note


Total Time (mins): 45


Comments: 





Patient hypotensive requiring frequent reassessments, due to gram-positive 

sepsis/septic shock.  Required pressors after fluid resuscitation.





Sepsis Event Note (ED)





- Evaluation


Sepsis Screening Result: No Definite Risk


Current Stage of Sepsis: Septic Shock


Possible Source of Sepsis: CLABSI





- Focused Exam


Sepsis Event Note Statement: Focused Sepsis Exam Completed


Vital Signs: 


See nursing notes


Respiratory Effort Without Exertion: Other (see below) (No respiratory 

distress.)


Heart Sounds: Other (see below) (Mildly tachycardic)


Capillary Refill, Detail: Less than/Equal to (</=) 2 Seconds


Skin Exam (Focused Sepsis): Normal Turgor


Date Exam was Performed: 05/26/21


Time Exam was Performed: 17:25





ED Communication





- Discussed Case With (1)


Discussed Case With (1): Admitting Provider


Person/s Notified (1): Akil Daugherty


Date: 05/26/21


Time Called: 11:45





- Discussed Case With (2)


Person/s Notified (3): Quinton Lorenzo


Date: 05/26/21


Time Called: 12:17





- My Orders


Last 24 Hours: 


My Active Orders





05/26/21 13:11


Saline Lock Insert [OM.PC] Stat 





05/26/21 14:51


Saline Lock Insert [OM.PC] Stat 














- Assessment/Plan


Last 24 Hours: 


My Active Orders





05/26/21 13:11


Saline Lock Insert [OM.PC] Stat 





05/26/21 14:51


Saline Lock Insert [OM.PC] Stat

## 2021-05-26 NOTE — CT
INDICATION:



Abdominal pain, sepsis.



TECHNIQUE:



CT of the abdomen and pelvis with 100 cc Isovue 370 IV contrast. Coronal 

and sagittal reconstructions.



COMPARISON:



CT of the abdomen and pelvis 04/19/2019. 



FINDINGS:



The liver, pancreas, and adrenal glands are negative. The spleen is mildly 

enlarged measuring 14.6 cm in AP dimension. Splenule. Cholecystectomy. No 

biliary dilation. Hepatic and portal veins are patent.



Symmetric enhancement of the kidneys. No hydronephrosis. No obstructing 

urinary calculi. Mild diffuse bladder wall thickening. Hysterectomy. No 

abnormality in the adnexa.



Open midline anterior abdominal wound with a small amount of protruding fat 

and nondistended small bowel loops. Resolution of previously seen colonic 

inflammatory changes. There is contrast present within the colon. Interval 

postoperative changes of right hemicolectomy with anastomosis in the mid 

transverse colon. There is a single mildly prominent fluid-filled loop of 

small bowel in the left pelvis (series 201, image 118). No evidence of 

small bowel obstruction. Possible small cluster of extraluminal gas locules 

in the right lower quadrant (series 201, image 120). This could be 

postoperative in nature. No generalized free air. No significant free 

fluid.



Aortoiliac vascular calcifications. Multiple stable mildly prominent 

periaortic lymph nodes. No new lymphadenopathy. Multiple surgical clips in 

the left lower quadrant and left pelvis. The bones are unremarkable. The 

lung bases are clear.



IMPRESSION:



1. Interval postoperative changes of right hemicolectomy. Small cluster of 

extraluminal gas locules in the right lower quadrant is likely 

postoperative in nature. No generalized free air. 



2. Single mildly prominent fluid-filled loop of small bowel in the left 

pelvis. No evidence of bowel obstruction. 



3. Open midline anterior abdominal wound with a small amount of protruding 

fat in nondistended small bowel loops. 



4. Mild splenomegaly. 



5. Mild diffuse bladder wall thickening. Correlate with urinalysis.



Please note that all CT scans at this facility use dose modulation, 

iterative reconstruction, and/or weight-based dosing when appropriate to 

reduce radiation dose to as low as reasonably achievable.



Dictated by Akiko Hannah MD @ 5/26/2021 3:52:34 PM



Signed by Dr. Akiko Hannah @ May 26 2021  3:52PM

## 2021-07-16 NOTE — PCM.EKG
** #1 Interpretation


EKG Date: 07/16/21


Time: 06:32


Rhythm: NSR


Rate (Beats/Min): 119


Axis: Normal


P-Wave: Present


QRS: Normal


ST-T: Normal


QT: Normal


Comparison: No Change (5/26/21)


EKG Interpretation Comments: 





Sinus Tachycardia

## 2021-07-16 NOTE — CR
Indication:



Hypoxia 



Comparison:



Single view chest May 25, 2021



Technique:



Single AP view chest



Findings:



There is hyperinflation and chronic interstitial change.



There are mildly increased interstitial markings likely representing 

developing pulmonary edema versus multifocal infiltrates. There is no 

pneumothorax or pleural effusion. Satisfactory position of left-sided PICC 

line.



The cardiac silhouette is mildly prominent.



The bony thorax is grossly intact.



Impression:



Mildly increased interstitial markings likely representing pulmonary edema 

with multifocal infiltrates.



Dictated by Leonard Pacheco MD @ 7/16/2021 6:46:35 AM



Signed by Dr. Leonard Pacheco @ Jul 16 2021  6:46AM

## 2021-07-16 NOTE — EDM.PDOC
<Guerrero Aguirre - Last Filed: 21 07:45>





ED HPI GENERAL MEDICAL PROBLEM





- General


Chief Complaint: Respiratory Problem


Stated Complaint: FASS


Time Seen by Provider: 21 06:07





- History of Present Illness


INITIAL COMMENTS - FREE TEXT/NARRATIVE: 





CHIEF COMPLAINT(S): Respiratory distress





HISTORY OF PRESENT ILLNESS: This is a 55-year-old woman with a past medical 

history of colorectal cancer status post resection with colostomy who is on TPN 

who presents to the emergency department as a medical resuscitation via EMS with

a chief complaint of respiratory distress.  Per EMS: The  called because 

the patient was altered, appeared short of breath and apparently fell off the 

bedroom this morning.  Otherwise history is limited.





The patient is alert and oriented x4.  She states that her whole body hurts.  

She denies any shortness of breath.  She states that she does not want this to 

continue.  She denies any specific complaints but she states that she did not 

fall of the bed she was getting up to use the restroom and she slid down.  She 

denies any head injury or loss of consciousness.





REVIEW OF SYSTEMS: 


Constitutional: Denies fever, chills.


Eyes: Positive for eye pain


Ears, Nose, Mouth, & Throat: Positive for earache, sore throat,


Cardiovascular: Positive for chest pain


Respiratory: Positive for shortness of breath


Gastrointestinal: Positive for abdominal pain denies Nausea, vomiting, diarrhea,

hematochezia.


Genitourinary: Denies hematuria


Skin:Denies a rash


MSK: Positive for total body pain


Neurological: Denies blurred vision


Psychiatric: Denies depression








PAST MEDICAL HISTORY: As per history of present illness and as reviewed below 

otherwise noncontributory.





SURGICAL HISTORY: As per history of present illness and as reviewed below 

otherwise noncontributory.





SOCIAL HISTORY: As per history of present illness and as reviewed below 

otherwise noncontributory.





FAMILY HISTORY: As per history of present illness and as reviewed below 

otherwise noncontributory.





EXAMINATION OF ORGAN SYSTEMS/BODY AREAS: 





VITALS: Heart rate 125, respiratory rate 24 with an oxygen saturation of 100% 

via bag-valve-mask.  Blood pressure was 79/33


GENERAL: Middle-aged woman who appears pale and struggling to breathe..


HEAD: Normocephalic, atraumatic. 


EYES: EOMs intact.  PERRL.  


ENT.  External ears WNL. Nares patent. Oropharynx is clear with no erythema or 

exudate. No uvular or tongue swelling.  Mouth is severely dry.  No stridor, no 

drooling, no trismus


NECK: Supple, no masses. Trachea is midline. 


LUNGS: The patient is tachypneic with agonal he breathing.  There is minimal 

breath sounds bilaterally with minimal expiratory wheezing.  No crackles or 

rales.  Patient is speaking in 1-2 word sentences


CARDIOVASCULAR: Tachycardic but regular.  No murmurs, rubs, gallops no edema. No

JVD.


ABDOMEN: Soft, non-distended, non-tender.  Bowel sounds present in all 4 

quadrants.  No rebound tenderness, guarding, or peritoneal signs.  There is a 

colostomy bag with intestine that is appearing to be well perfused


MUSCULOSKELETAL: No deformity.  Patient is moving all 4 limbs spontaneously.  


NEUROLOGICAL: Alert and oriented x 3. No focal neurological deficits noted. 


SKIN: No rashes, or pallor. No signs of injury.  Skin is pale





MEDICAL DECISION MAKING AND COURSE IN THE ED WITH INTERPRETATION/REVIEW OF 

DIAGNOSTIC STUDIES: This is a 55-year-old woman with a past medical history of 

colorectal cancer status post resection with colostomy who is on TPN who 

presents to the emergency department as a medical resuscitation via EMS with a 

chief complaint of respiratory distress.  Immediately upon entering the 

resuscitation room the patient was disrobed, placed on continuous cardiac 

monitoring, and IV access was established by nursing.  Patient is able to speak 

thus displaying a patent airway, breath sounds are equal bilaterally but 

diminished with prolonged expiratory phase and expiratory wheezing, and patient 

did not have any radial or DP pulses but central pulses could be palpated and 

were strong.  The patient is alert and oriented x4 but was tachypneic.  While 

attempting to get IV access and a blood pressure I did order a DuoNeb treatment 

given her history of COPD.  We did obtain an EKG which did not reveal any acute 

signs of ischemia.  We placed the patient on cardiac monitor and pulse oximetry.

 We did transition the patient to 15 L nonrebreather to do the DuoNeb treatment 

and pulse oximetry with good waveform was approximately 100% at this time.  We 

did obtain a blood pressure which was a MAP less than 65.  At this time given 

the hypoxia the tachycardia and use of TPN differential does include sepsis.  At

this time we will provide the patient with 30 cc/kg bolus and obtain a septic 

work-up.  We did obtain blood cultures prior to administration of cefepime and 

vancomycin.  After initiating fluid bolus we did obtain IV access in the right 

arm and blood pressure had increased with a MAP greater than 65.  The patient 

was mentating better and was no longer cyanotic and appeared to be speaking in 

longer sentences.  I did confirm patient's CODE STATUS which is full code.  

Given that she has a history of colorectal cancer we will obtain a CT angiogram 

of the chest to evaluate for pulmonary embolism.  In addition we will obtain CT 

abdomen pelvis to evaluate for other causes of sepsis.





Laboratory: CBC reveals a normocytic anemia with a hemoglobin of 8.6 hematocrit 

of 26.7 with thrombocytopenia at 24.  INR is 1.1.  CMP reveals hyponatremia at 

132, hypochloremia at 96, elevated BUN at 33 and a creatinine of 1.3.  

Hyperglycemia at 124, hypocalcemia at 8.0, hypermagnesemia 2.6 and elevated 

bilirubin at 1.5.  Hypoalbuminemia at 2.2.  Lactic acidosis of 4.3 and an 

elevated troponin of 0.61.





AB.42/51/201/33





The radiological images were viewed by myself along with reading the report from

the radiologist.


Chest x-ray reveals mildly increased interstitial markings likely representing 

pulmonary edema versus multifocal infiltrates





Tissue Perfusion Exam


On reevaluation capillary refill was less than 2 seconds, skin color was pink, 

blood pressure was 108/58 and patient was now satting 93% on 3 L nasal cannula.





Patient was signed out to [facility] day team physician pending final disposition.





DISPOSITION: Patient was signed out to [facility] day team physician pending 

imaging and final disposition





CONDITION: Serious





PROCEDURES: Cardiac monitoring interpretation, pulse oximetry interpretation





FINAL IMPRESSION(S)/DIAGNOSES: 





1.  Acute hypoxic respiratory failure requiring supplemental oxygenation likely 

secondary to COPD exacerbation versus atypical pneumonia versus pleural edema


2.  Acute tachycardia


3.  Acute lactic acidosis likely secondary to #1


4.  Acute elevated troponin likely secondary to demand ischemia secondary to #1





Critical Care Procedure Note





Authorized and performed by: Guerrero Aguirre M.D. 


Critical Care Time: 62 minutes


Due to a high probability of clinically significant, life threatening 

deterioration, the patient required my highest level of preparedness to 

intervene emergently and I personally spent this critical care time directly and

personally managing the patient. This critical care time included obtaining a 

history, examining the patient, pulse oximetry; ordering and review of studies; 

arranging urgent treatment with development of a management plan; evaluation of 

a patients reponse to treatment; frequent assessment; and discussions with other

providers. This critical care time was performed to assess and manage the high 

probability of imminent, life threatening deterioration that could result in 

multiorgan failure. It was exclusive of separate billable procedures and 

treating other patients. 





Please see MDM section and rest of the note for further information on patient 

assessment and treatment.





Please see MDM section and rest of the note for further information on patient 

assessment and treatment.











- Related Data


                                    Allergies











Allergy/AdvReac Type Severity Reaction Status Date / Time


 


codeine Allergy  Hives Verified 21 06:05


 


pseudoephedrine Allergy  palpitation Verified 21 06:05





   s  


 


Sulfa (Sulfonamide Allergy  unknown Verified 21 06:05





Antibiotics)     











Home Meds: 


                                    Home Meds





ClonazePAM [KlonoPIN] 1 mg PO TID PRN 18 [History]


Umeclidinium Brm/Vilanterol Tr [Anoro Ellipta 62.5-25 MCG] 1 puff IH DAILY 

20 [History]


Levothyroxine 50 mcg PO ACBREAKFAST 10/26/20 [History]


Amitriptyline [Elavil] 50 mg PO BEDTIME 21 [History]


Blood Sugar Diagnostic [Glucose Test Strip] 1 each MC Q6H #1 box 21 [Rx]


Calcium Carbonate [Tums] 1,000 mg PO Q6H 21 [History]


HYDROmorphone [Dilaudid] 2 mg PO Q4H PRN 21 [History]


Lancets 1 each MC Q6H #1 box 21 [Rx]


Loperamide [Imodium] 4 mg PO Q6H PRN 21 [History]


Loratadine 10 mg PO DAILY 21 [History]


Mirtazapine 15 mg PO BEDTIME 21 [History]


Sucralfate 1 gm PO QID 21 [History]


fentaNYL [Duragesic] 25 mcg TD Q72H 21 [History]











Past Medical History


HEENT History: Reports: None


Cardiovascular History: Reports: Blood Clots/VTE/DVT, Other (See Below)


Other Cardiovascular History: on Eliquis in multiple places (?)


Respiratory History: Reports: COPD, SOB, Other (See Below)


Other Respiratory History: not on CPAP


Gastrointestinal History: Reports: None


Genitourinary History: Reports: None


OB/GYN History: Reports: Pregnancy


Musculoskeletal History: Reports: None


Neurological History: Reports: None


Psychiatric History: Reports: Anxiety, Depression, PTSD


Endocrine/Metabolic History: Reports: None


Hematologic History: Reports: None


Immunologic History: Reports: None


Oncologic (Cancer) History: Reports: Colon


Dermatologic History: Reports: None





- Infectious Disease History


Infectious Disease History: Reports: Chicken Pox, Measles, Mumps





- Past Surgical History


Head Surgeries/Procedures: Reports: None


HEENT Surgical History: Reports: Adenoidectomy, Tonsillectomy


Cardiovascular Surgical History: Reports: None


Respiratory Surgical History: Reports: None


GI Surgical History: Reports: Appendectomy, Cholecystectomy, Colostomy, Hernia, 

Abdominal, Other (See Below)


Other GI Surgeries/Procedures: prior colostomy and ileostomy


Female  Surgical History: Reports: Hysterectomy, Salpingo-Oophorectomy


Musculoskeletal Surgical History: Reports: Other (See Below)


Other Musculoskeletal Surgeries/Procedures:: right thumb surgery





Social & Family History





- Family History


Family Medical History: No Pertinent Family History





- Tobacco Use


Tobacco Use Status *Q: Never Tobacco User





- Caffeine Use


Caffeine Use: Reports: None





- Recreational Drug Use


Recreational Drug Use: No





ED ROS GENERAL





- Review of Systems


Review Of Systems: See Below





ED EXAM, GENERAL





- Physical Exam


Exam: See Below





Departure





- Departure


Disposition: DC/Tfer to Acute Hospital [facility]


Clinical Impression: 


 Respiratory failure, Sepsis, Non-STEMI (non-ST elevated myocardial infarction)








- Discharge Information


Referrals: 


PCP,None [Primary Care Provider] - 


Forms:  ED Department Discharge





Sepsis Event Note (ED)





- Evaluation


Sepsis Screening Result: No Definite Risk





<Gilbert Georges - Last Filed: 21 09:40>





ED RESPIRATORY PROCEDURES





- Endotracheal Intubation


Time of Intubation: 08:45


ET Intubation Indication: Respiratory Failure, Airway Protection


Preparation: Suction, BVM Set Up, Difficult Airway Equip


Airway Assessment: Obese


Pre-Oxygenation: Assisted with BVM


Anesthesia Meds: Etomidate, Rocuronium


Placement: Orotracheal


Cords Visualized: Yes


ETT Size In mm: 7.0


Number of Attempts: 2 (Direct visualization with the kaleidoscope revealed that 

the 7 and half tube initially attempted was too large.  1 attempt was necessary 

with the 7.0.)


Confirmed By: CO2 Indicator, Bilateral Breath Sounds, Chest Xray


Tube Secured By: By RT





Course





- Vital Signs


Text/Narrative:: 





I am dictating an 936 when the computer came back up.  These are the events that

have happened since 7 AM.  A CT was ordered to rule out pulmonary embolus and 

the patient was improving had 7 AM.  This computer went down and will be unable 

to have the CT read so Lovenox was given as prophylaxis.  Troponin I was 

reported as 0.061.  This indicates myocardial damage during the hypoxic episode.





The patient began to get more somnolent.  She had slower respiratory rate with 

increased effort.  She was arousable to noxious stimuli.





I called [facility] where they can take care of a person who has had 

myocardial damage and take care of a person who needs to be ventilated.  The 

patient had clearly told me that if she stopped breathing she wants to be 

ventilated with an endotracheal tube.  The  was reluctant but understood 

that her wishes needed to be honored and agreed.





I intubated the patient see separate procedure note.  The patient was accepted 

by Dr. Haro at [facility] for admission and further evaluation.  They will 

perform the CT angio of there and have cardiology consultation.





Due to a high probability of clinically significant, life threatening 

deterioration, the patient required my highest level of preparedness to 

intervene emergently and I personally spent this critical care time directly and

personally managing the patient. This critical care time included obtaining a 

history; examining the patient; pulse oximetry; ordering and review of studies; 

arranging urgent treatment with development of a management plan; evaluation of 

patient's response to treatment; frequent reassessment; and, discussions with 

other providers.


This critical care time was performed to assess and manage the high probability 

of imminent, life-threatening deterioration that could result in multi-organ 

failure. It was exclusive of separately billable procedures and treating other 

patients and teaching time.  Critical care time 52 minutes separate from the 

procedure and things that are separate billable.





After patient was sedated for intubation she dropped her pressure transiently 

but before she needed pressors it came back up.  She began to wake up.  

Additional medications were needed to keep her sedated.





The helicopter is coming to get the patient and take her to [facility] and 

arrival as expected at 9:50 AM.


Last Recorded V/S: 





                                Last Vital Signs











Temp  36.5 C   21 06:21


 


Pulse  117 H  21 07:00


 


Resp  22 H  21 07:00


 


BP  108/58 L  21 07:00


 


Pulse Ox  93 L  21 07:00














- Orders/Labs/Meds


Orders: 





                               Active Orders 24 hr











 Category Date Time Status


 


 EKG Documentation Completion [RC] STAT Care  21 06:09 Active


 


 Abdomen Pelvis w Cont [CT] Stat Exams  21 06:15 Ordered


 


 Ang Chest [CT] Stat Exams  21 06:15 Ordered


 


 CULTURE BLOOD [BC] Stat Lab  21 06:22 Results


 


 CULTURE BLOOD [BC] Stat Lab  21 06:29 Received


 


 REFLEX LACTIC ACID YES OR NO [CHEM] Routine Lab  21 07:07 Received


 


 Lactated Ringers [Ringers, Lactated] 1,000 ml Med  21 06:45 Active





 IV ASDIRECTED   


 


 Blood Culture x2 Reflex Set [OM.PC] Stat Oth  21 06:08 Ordered








                                Medication Orders





Lactated Ringer's (Ringers, Lactated)  1,000 mls @ 125 mls/hr IV ASDIRECTED [place]








Labs: 





                                Laboratory Tests











  21 Range/Units





  06:23 06:29 06:29 


 


WBC   6.26   (4.0-11.0)  K/uL


 


RBC   2.91 L   (4.30-5.90)  M/uL


 


Hgb   8.6 L   (12.0-16.0)  g/dL


 


Hct   26.7 L   (36.0-46.0)  %


 


MCV   91.8   (80.0-98.0)  fL


 


MCH   29.6   (27.0-32.0)  pg


 


MCHC   32.2   (31.0-37.0)  g/dL


 


RDW Std Deviation   54.2   (28.0-62.0)  fl


 


RDW Coeff of Jason   16 H   (11.0-15.0)  %


 


Plt Count   24 L   (150-400)  K/uL


 


Neut % (Auto)   89.8 H   (48.0-80.0)  %


 


Lymph % (Auto)   3.7 L   (16.0-40.0)  %


 


Mono % (Auto)   6.5   (0.0-15.0)  %


 


Eos % (Auto)   0.0   (0.0-7.0)  %


 


Baso % (Auto)   0.0   (0.0-1.5)  %


 


Neut # (Auto)   5.6   (1.4-5.7)  K/uL


 


Lymph # (Auto)   0.2 L   (0.6-2.4)  K/uL


 


Mono # (Auto)   0.4   (0.0-0.8)  K/uL


 


Eos # (Auto)   0.0   (0.0-0.7)  K/uL


 


Baso # (Auto)   0.0   (0.0-0.1)  K/uL


 


Nucleated RBC %   0.0   /100WBC


 


Nucleated RBCs #   0   K/uL


 


INR    1.11  


 


ABG pH  7.42    (7.35-7.45)  


 


ABG pCO2  51 H    (35-45)  mmHG


 


ABG pO2  201 H    ()  mmHG


 


ABG HCO3  33 H    (22-26)  mEq/L


 


ABG Total CO2  34 H    (23-27)  mmol/L


 


ABG Base Excess  7.0 H    (-2.0-3.0)  


 


Sodium     (136-145)  mmol/L


 


Potassium     (3.5-5.1)  mmol/L


 


Chloride     ()  mmol/L


 


Carbon Dioxide     (21.0-32.0)  mmol/L


 


BUN     (7.0-18.0)  mg/dL


 


Creatinine     (0.6-1.0)  mg/dL


 


Est Cr Clr Drug Dosing     mL/min


 


Estimated GFR (MDRD)     ml/min


 


Glucose     ()  mg/dL


 


Lactic Acid     (0.4-2.0)  mmol/L


 


Calcium     (8.5-10.1)  mg/dL


 


Magnesium     (1.8-2.4)  mg/dL


 


Total Bilirubin     (0.2-1.0)  mg/dL


 


AST     (15-37)  IU/L


 


ALT     (14-63)  IU/L


 


Alkaline Phosphatase     ()  U/L


 


Troponin I     (0.000-0.056)  ng/mL


 


Total Protein     (6.4-8.2)  g/dL


 


Albumin     (3.4-5.0)  g/dL


 


Globulin     (2.6-4.0)  g/dL


 


Albumin/Globulin Ratio     (0.9-1.6)  


 


Urine Color     


 


Urine Appearance     


 


Urine pH     (5.0-8.0)  


 


Ur Specific Gravity     (1.001-1.035)  


 


Urine Protein     (NEGATIVE)  mg/dL


 


Urine Glucose (UA)     (NEGATIVE)  mg/dL


 


Urine Ketones     (NEGATIVE)  mg/dL


 


Urine Occult Blood     (NEGATIVE)  


 


Urine Nitrite     (NEGATIVE)  


 


Urine Bilirubin     (NEGATIVE)  


 


Urine Ictotest     


 


Urine Urobilinogen     (<2.0)  EU/dL


 


Ur Leukocyte Esterase     (NEGATIVE)  


 


U Hyaline Cast (Auto)     (0-2/LPF)  


 


Urine RBC     (0-2/HPF)  


 


Urine WBC     (0-5/HPF)  


 


Ur Epithelial Cells     (NONE-FEW)  


 


Urine Bacteria     (NEGATIVE)  


 


Fine Granular Casts     (NEGATIVE)  


 


Coarse Granular Casts     (NEGATIVE)  


 


SARS-CoV-2 RNA (RODRIGO)     (NEGATIVE)  














  21 Range/Units





  06:29 06:29 06:40 


 


WBC     (4.0-11.0)  K/uL


 


RBC     (4.30-5.90)  M/uL


 


Hgb     (12.0-16.0)  g/dL


 


Hct     (36.0-46.0)  %


 


MCV     (80.0-98.0)  fL


 


MCH     (27.0-32.0)  pg


 


MCHC     (31.0-37.0)  g/dL


 


RDW Std Deviation     (28.0-62.0)  fl


 


RDW Coeff of Jason     (11.0-15.0)  %


 


Plt Count     (150-400)  K/uL


 


Neut % (Auto)     (48.0-80.0)  %


 


Lymph % (Auto)     (16.0-40.0)  %


 


Mono % (Auto)     (0.0-15.0)  %


 


Eos % (Auto)     (0.0-7.0)  %


 


Baso % (Auto)     (0.0-1.5)  %


 


Neut # (Auto)     (1.4-5.7)  K/uL


 


Lymph # (Auto)     (0.6-2.4)  K/uL


 


Mono # (Auto)     (0.0-0.8)  K/uL


 


Eos # (Auto)     (0.0-0.7)  K/uL


 


Baso # (Auto)     (0.0-0.1)  K/uL


 


Nucleated RBC %     /100WBC


 


Nucleated RBCs #     K/uL


 


INR     


 


ABG pH     (7.35-7.45)  


 


ABG pCO2     (35-45)  mmHG


 


ABG pO2     ()  mmHG


 


ABG HCO3     (22-26)  mEq/L


 


ABG Total CO2     (23-27)  mmol/L


 


ABG Base Excess     (-2.0-3.0)  


 


Sodium  132 L    (136-145)  mmol/L


 


Potassium  3.7    (3.5-5.1)  mmol/L


 


Chloride  96 L    ()  mmol/L


 


Carbon Dioxide  30.8    (21.0-32.0)  mmol/L


 


BUN  33 H    (7.0-18.0)  mg/dL


 


Creatinine  1.3 H    (0.6-1.0)  mg/dL


 


Est Cr Clr Drug Dosing  48.16    mL/min


 


Estimated GFR (MDRD)  42.2    ml/min


 


Glucose  124 H    ()  mg/dL


 


Lactic Acid   4.3 H*   (0.4-2.0)  mmol/L


 


Calcium  8.0 L    (8.5-10.1)  mg/dL


 


Magnesium  2.7 H    (1.8-2.4)  mg/dL


 


Total Bilirubin  1.5 H    (0.2-1.0)  mg/dL


 


AST  34    (15-37)  IU/L


 


ALT  31    (14-63)  IU/L


 


Alkaline Phosphatase  149 H    ()  U/L


 


Troponin I  0.061 H*    (0.000-0.056)  ng/mL


 


Total Protein  6.0 L    (6.4-8.2)  g/dL


 


Albumin  2.2 L    (3.4-5.0)  g/dL


 


Globulin  3.8    (2.6-4.0)  g/dL


 


Albumin/Globulin Ratio  0.6 L    (0.9-1.6)  


 


Urine Color     


 


Urine Appearance     


 


Urine pH     (5.0-8.0)  


 


Ur Specific Gravity     (1.001-1.035)  


 


Urine Protein     (NEGATIVE)  mg/dL


 


Urine Glucose (UA)     (NEGATIVE)  mg/dL


 


Urine Ketones     (NEGATIVE)  mg/dL


 


Urine Occult Blood     (NEGATIVE)  


 


Urine Nitrite     (NEGATIVE)  


 


Urine Bilirubin     (NEGATIVE)  


 


Urine Ictotest     


 


Urine Urobilinogen     (<2.0)  EU/dL


 


Ur Leukocyte Esterase     (NEGATIVE)  


 


U Hyaline Cast (Auto)     (0-2/LPF)  


 


Urine RBC     (0-2/HPF)  


 


Urine WBC     (0-5/HPF)  


 


Ur Epithelial Cells     (NONE-FEW)  


 


Urine Bacteria     (NEGATIVE)  


 


Fine Granular Casts     (NEGATIVE)  


 


Coarse Granular Casts     (NEGATIVE)  


 


SARS-CoV-2 RNA (RODRIGO)    NEGATIVE  (NEGATIVE)  














  21 Range/Units





  07:15 


 


WBC   (4.0-11.0)  K/uL


 


RBC   (4.30-5.90)  M/uL


 


Hgb   (12.0-16.0)  g/dL


 


Hct   (36.0-46.0)  %


 


MCV   (80.0-98.0)  fL


 


MCH   (27.0-32.0)  pg


 


MCHC   (31.0-37.0)  g/dL


 


RDW Std Deviation   (28.0-62.0)  fl


 


RDW Coeff of Jason   (11.0-15.0)  %


 


Plt Count   (150-400)  K/uL


 


Neut % (Auto)   (48.0-80.0)  %


 


Lymph % (Auto)   (16.0-40.0)  %


 


Mono % (Auto)   (0.0-15.0)  %


 


Eos % (Auto)   (0.0-7.0)  %


 


Baso % (Auto)   (0.0-1.5)  %


 


Neut # (Auto)   (1.4-5.7)  K/uL


 


Lymph # (Auto)   (0.6-2.4)  K/uL


 


Mono # (Auto)   (0.0-0.8)  K/uL


 


Eos # (Auto)   (0.0-0.7)  K/uL


 


Baso # (Auto)   (0.0-0.1)  K/uL


 


Nucleated RBC %   /100WBC


 


Nucleated RBCs #   K/uL


 


INR   


 


ABG pH   (7.35-7.45)  


 


ABG pCO2   (35-45)  mmHG


 


ABG pO2   ()  mmHG


 


ABG HCO3   (22-26)  mEq/L


 


ABG Total CO2   (23-27)  mmol/L


 


ABG Base Excess   (-2.0-3.0)  


 


Sodium   (136-145)  mmol/L


 


Potassium   (3.5-5.1)  mmol/L


 


Chloride   ()  mmol/L


 


Carbon Dioxide   (21.0-32.0)  mmol/L


 


BUN   (7.0-18.0)  mg/dL


 


Creatinine   (0.6-1.0)  mg/dL


 


Est Cr Clr Drug Dosing   mL/min


 


Estimated GFR (MDRD)   ml/min


 


Glucose   ()  mg/dL


 


Lactic Acid   (0.4-2.0)  mmol/L


 


Calcium   (8.5-10.1)  mg/dL


 


Magnesium   (1.8-2.4)  mg/dL


 


Total Bilirubin   (0.2-1.0)  mg/dL


 


AST   (15-37)  IU/L


 


ALT   (14-63)  IU/L


 


Alkaline Phosphatase   ()  U/L


 


Troponin I   (0.000-0.056)  ng/mL


 


Total Protein   (6.4-8.2)  g/dL


 


Albumin   (3.4-5.0)  g/dL


 


Globulin   (2.6-4.0)  g/dL


 


Albumin/Globulin Ratio   (0.9-1.6)  


 


Urine Color  YELLOW  


 


Urine Appearance  CLEAR  


 


Urine pH  5.5  (5.0-8.0)  


 


Ur Specific Gravity  1.025  (1.001-1.035)  


 


Urine Protein  100 H  (NEGATIVE)  mg/dL


 


Urine Glucose (UA)  NEGATIVE  (NEGATIVE)  mg/dL


 


Urine Ketones  NEGATIVE  (NEGATIVE)  mg/dL


 


Urine Occult Blood  SMALL H  (NEGATIVE)  


 


Urine Nitrite  NEGATIVE  (NEGATIVE)  


 


Urine Bilirubin  SMALL H  (NEGATIVE)  


 


Urine Ictotest  POSITIVE  


 


Urine Urobilinogen  0.2  (<2.0)  EU/dL


 


Ur Leukocyte Esterase  TRACE H  (NEGATIVE)  


 


U Hyaline Cast (Auto)  6-10  (0-2/LPF)  


 


Urine RBC  0-2  (0-2/HPF)  


 


Urine WBC  3-5  (0-5/HPF)  


 


Ur Epithelial Cells  FEW  (NONE-FEW)  


 


Urine Bacteria  2+ H  (NEGATIVE)  


 


Fine Granular Casts  3-5  (NEGATIVE)  


 


Coarse Granular Casts  1-3  (NEGATIVE)  


 


SARS-CoV-2 RNA (RODRIGO)   (NEGATIVE)  











Meds: 





Medications











Generic Name Dose Route Start Last Admin





  Trade Name Freq  PRN Reason Stop Dose Admin


 


Lactated Ringer's  1,000 mls @ 125 mls/hr  21 06:45 





  Ringers, Lactated  IV  





  ASDIRECTED [place]  














Discontinued Medications














Generic Name Dose Route Start Last Admin





  Trade Name Freq  PRN Reason Stop Dose Admin


 


Albuterol/Ipratropium  3 ml  21 06:24  21 06:24





  Albuterol/Ipratropium 3.0-0.5 Mg/3 Ml Neb Soln  NEB  21 06:25  3 ml





  ONETIME ONE   Administration


 


Calcium Gluconate  1 gm  21 07:39 





  Calcium Gluconate 10% 1 Gm/10 Ml Sdv  IVPUSH  21 07:40 





  ONETIME ONE  


 


Enoxaparin Sodium  100 mg  21 07:46 





  Enoxaparin 100 Mg/1 Ml Syringe  SUBCUT  21 07:47 





  ONETIME ONE  


 


Lactated Ringer's  1,000 mls @ 2,115 mls/hr  21 06:08  21 06:24





  Ringers, Lactated  IV  21 06:36  2,115 mls/hr





  .BOLUS ONE   Administration


 


Cefepime HCl 2 gm/ Premix  50 mls @ 100 mls/hr  21 06:11  21 06:38





  IV  21 06:40  100 mls/hr





  ONETIME ONE   Administration


 


Vancomycin HCl 2 gm/ Premix  400 mls @ 200 mls/hr  21 06:11 





  IV  21 08:10 





  STAT ONE  


 


Propofol  Confirm  21 08:31 





  Diprivan 100 Ml  Administered  21 08:32 





  Dose  





  100 mls @ as directed  





  .ROUTE  





  .STK-MED ONE  














Departure





- Departure


Time of Disposition: 09:50


Condition: Serious





Sepsis Event Note (ED)





- Focused Exam


Vital Signs: 





                                   Vital Signs











  Temp Pulse Resp BP Pulse Ox


 


 21 07:00   117 H  22 H  108/58 L  93 L


 


 21 06:32   120 H  22 H  101/51 L  97


 


 21 06:21  36.5 C    


 


 21 06:05   125 H  24 H  79/33 L  100

## 2021-07-19 NOTE — CR
Indication:

Post intubation 



Comparison:

Single view chest July 16, 2021





Technique:

Single AP view chest



Findings:

There is again seen hyperinflation and chronic interstitial changes with mildly 
increased interstitial markings likely representing worsening pulmonary edema. 
There is no pneumothorax.



Interval placement of endotracheal tube in satisfactory position. There is 
demonstration of nasogastric tube with the tip in the distal esophagus, 
recommend advancement 12 centimeters into the gastric lumen.



The cardiac silhouette is stably enlarged with stable left-sided PICC line.



The bony thorax is grossly intact.



Impression:

Satisfactory position of endotracheal tube. Demonstration of nasogastric tube 
with the tip in the distal esophagus, recommend advancement approximately 10-12 
centimeters into the gastric lumen. Otherwise mildly increased interstitial 
markings likely representing mild pulmonary edema.





Dictated by Leonard Pacheco MD @ 7/16/2021 10:06:46 AM

Signed by: Leonard Pacheco MD @7/16/2021 10:06:46 AM

(Electronic Signature)



MTDD

## 2021-10-13 NOTE — EDM.PDOC
ED HPI GENERAL MEDICAL PROBLEM





- General


Chief Complaint: General


Stated Complaint: kidney failure 


Time Seen by Provider: 10/13/21 19:33


Source of Information: Reports: Patient, Family


History Limitations: Reports: No Limitations





- History of Present Illness


INITIAL COMMENTS - FREE TEXT/NARRATIVE: 





Patient is a 55-year-old female with a complicated medical history including abd

ominal complications requiring a colostomy bag.  Patient spent a month in the 

[facility] for these GI issues.  She went to her primary care 

physician today and had basic labs drawn and her labs came back and shows she 

possibly has acute renal failure.   the bedside states patient is not 

been feeling well lately has been tolerating small amounts of fluids but had 

minimal amount of urinary output today.  Also having the abnormal labs and not 

feeling well the patient is has had any fever cough or any change in nausea 

vomiting.  She still has good output in her colostomy bag.





- Related Data


                                    Allergies











Allergy/AdvReac Type Severity Reaction Status Date / Time


 


codeine Allergy  Hives Verified 10/13/21 19:49


 


pseudoephedrine Allergy  palpitation Verified 10/13/21 19:49





   s  


 


Sulfa (Sulfonamide Allergy  unknown Verified 10/13/21 19:49





Antibiotics)     











Home Meds: 


                                    Home Meds





ClonazePAM [KlonoPIN] 1 mg PO TID PRN 05/31/18 [History]


Umeclidinium Brm/Vilanterol Tr [Anoro Ellipta 62.5-25 MCG] 1 puff IH DAILY 

07/28/20 [History]


Levothyroxine 50 mcg PO ACBREAKFAST 10/26/20 [History]


Amitriptyline [Elavil] 50 mg PO BEDTIME 01/29/21 [History]


Blood Sugar Diagnostic [Glucose Test Strip] 1 each MC Q6H #1 box 05/14/21 [Rx]


Calcium Carbonate [Tums] 1,000 mg PO Q6H 05/14/21 [History]


HYDROmorphone [Dilaudid] 2 mg PO Q4H PRN 05/14/21 [History]


Lancets 1 each MC Q6H #1 box 05/14/21 [Rx]


Loperamide [Imodium] 4 mg PO Q6H PRN 05/14/21 [History]


Loratadine 10 mg PO DAILY 05/14/21 [History]


Mirtazapine 15 mg PO BEDTIME 05/14/21 [History]


Sucralfate 1 gm PO QID 05/14/21 [History]


fentaNYL [Duragesic] 25 mcg TD Q72H 05/14/21 [History]











Past Medical History


HEENT History: Reports: None


Cardiovascular History: Reports: Blood Clots/VTE/DVT, Other (See Below)


Other Cardiovascular History: on Eliquis in multiple places (?)


Respiratory History: Reports: COPD, SOB, Other (See Below)


Other Respiratory History: not on CPAP


Gastrointestinal History: Reports: None


Genitourinary History: Reports: None


OB/GYN History: Reports: Pregnancy


Musculoskeletal History: Reports: None


Neurological History: Reports: None


Psychiatric History: Reports: Anxiety, Depression, PTSD


Endocrine/Metabolic History: Reports: None


Hematologic History: Reports: None


Immunologic History: Reports: None


Oncologic (Cancer) History: Reports: Colon


Dermatologic History: Reports: None





- Infectious Disease History


Infectious Disease History: Reports: Chicken Pox, Measles, Mumps





- Past Surgical History


Head Surgeries/Procedures: Reports: None


HEENT Surgical History: Reports: Adenoidectomy, Tonsillectomy


Cardiovascular Surgical History: Reports: None


Respiratory Surgical History: Reports: None


GI Surgical History: Reports: Appendectomy, Cholecystectomy, Colostomy, Hernia, 

Abdominal, Other (See Below)


Other GI Surgeries/Procedures: prior colostomy and ileostomy


Female  Surgical History: Reports: Hysterectomy, Salpingo-Oophorectomy


Musculoskeletal Surgical History: Reports: Other (See Below)


Other Musculoskeletal Surgeries/Procedures:: right thumb surgery





Social & Family History





- Family History


Family Medical History: No Pertinent Family History





- Tobacco Use


Second Hand Smoke Exposure: No





- Caffeine Use


Caffeine Use: Reports: None





- Recreational Drug Use


Recreational Drug Use: No





ED ROS GENERAL





- Review of Systems


Review Of Systems: See Below


Constitutional: Reports: No Symptoms


HEENT: Reports: No Symptoms


Respiratory: Reports: No Symptoms


Cardiovascular: Reports: No Symptoms


Endocrine: Reports: No Symptoms


GI/Abdominal: Reports: No Symptoms


: Reports: No Symptoms


Musculoskeletal: Reports: No Symptoms


Skin: Reports: No Symptoms


Neurological: Reports: No Symptoms


Psychiatric: Reports: No Symptoms


Hematologic/Lymphatic: Reports: No Symptoms


Immunologic: Reports: No Symptoms





ED EXAM, GENERAL





- Physical Exam


Exam: See Below


Exam Limited By: No Limitations


General Appearance: Alert, WD/WN, No Apparent Distress


Eye Exam: Bilateral Eye: EOMI, PERRL


Nose: Normal Inspection


Throat/Mouth: Normal Inspection


Head: Atraumatic, Normocephalic


Neck: Normal Inspection, Supple, Non-Tender


Respiratory/Chest: No Respiratory Distress, Lungs Clear, Normal Breath Sounds


Cardiovascular: Normal Peripheral Pulses, Regular Rate, Rhythm, No Edema


GI/Abdominal: Other (Colostomy bag in place plastic bag is slightly open and has

 some stool over her abdomen as well)


Extremities: Normal Inspection, Normal Range of Motion


Neurological: Alert, Oriented


  ** #1 Interpretation


EKG Date: 10/13/21


Time: 19:59


Rhythm: NSR


Rate (Beats/Min): 80


ST-T: Normal





Course





- Vital Signs


Last Recorded V/S: 


                                Last Vital Signs











Temp  97 F   10/13/21 19:45


 


Pulse  87   10/14/21 05:00


 


Resp  11 L  10/14/21 05:00


 


BP  85/60 L  10/14/21 05:11


 


Pulse Ox  98   10/14/21 05:00














- Orders/Labs/Meds


Orders: 


                               Active Orders 24 hr











 Category Date Time Status


 


 Insert Lorenzana Catheter [Insert Urinary Catheter] [OM.PC] Care  10/14/21 05:30 

Ordered





 Q24H   


 


 Insert Urinary Catheter [OM.PC] Q24H Care  10/13/21 21:15 Ordered


 


 Urinary Catheter Assessment [RC] ASDIRECTED Care  10/13/21 21:12 Active


 


 Urinary Catheter Assessment [RC] ASDIRECTED Care  10/14/21 05:22 Active


 


 CULTURE BLOOD [BC] Stat Lab  10/13/21 23:47 Results


 


 CULTURE BLOOD [BC] Stat Lab  10/13/21 23:47 Results


 


 GLUCOSE,POC [POC] Stat Lab  10/14/21 03:46 Ordered


 


 OSMOLALITY - URINE Stat Lab  10/13/21 23:15 Received


 


 PROCALCITONIN [REF] Stat Lab  10/13/21 23:51 Received


 


 UREA NITROGEN, URINE Stat Lab  10/14/21 00:00 Received


 


 URIC ACID, URINE Stat Lab  10/14/21 00:00 Received


 


 DOPamine/Dextrose 5%-Water [DOPamine in D5W 400 MG/250 Med  10/14/21 05:30 

Active





 ML]   





 400 mg in 250 ml IV ASDIRECTED   


 


 Sodium Chloride 0.9% [Normal Saline] 1,000 ml Med  10/14/21 01:15 Active





 IV ASDIRECTED   


 


 Sodium Chloride 0.9% [Normal Saline] 1,000 ml Med  10/14/21 05:30 Active





 IV ASDIRECTED   


 


 Blood Culture x2 Reflex Set [OM.PC] Stat Oth  10/13/21 23:47 Ordered








                                Medication Orders





Sodium Chloride (Normal Saline)  1,000 mls @ 1,000 mls/hr IV ASDIRECTED [place]


   Last Admin: 10/14/21 01:53  Dose: 1,000 mls/hr


   Documented by: [email or web address]


Sodium Chloride (Normal Saline)  1,000 mls @ 1,000 mls/hr IV ASDIRECTED [place]


Dopamine HCl/Dextrose (Dopamine In D5w 400 Mg/250 Ml)  400 mg in 250 mls @ 4.286

 mls/hr IV ASDIRECTED [place]; Protocol








Labs: 


                                Laboratory Tests











  10/13/21 10/13/21 10/13/21 Range/Units





  20:17 20:17 20:17 


 


WBC  12.27 H    (4.0-11.0)  K/uL


 


RBC  6.11 H    (4.30-5.90)  M/uL


 


Hgb  16.6 H    (12.0-16.0)  g/dL


 


Hct  44.4    (36.0-46.0)  %


 


MCV  72.7 L    (80.0-98.0)  fL


 


MCH  27.2    (27.0-32.0)  pg


 


MCHC  37.4 H    (31.0-37.0)  g/dL


 


RDW Std Deviation  44.1    (28.0-62.0)  fl


 


RDW Coeff of Jason  17 H    (11.0-15.0)  %


 


Plt Count  52    


 


MPV      (7.40-12.00)  fL


 


Neut % (Auto)  89.4 H    (48.0-80.0)  %


 


Lymph % (Auto)  4.2 L    (16.0-40.0)  %


 


Mono % (Auto)  6.2    (0.0-15.0)  %


 


Eos % (Auto)  0.1    (0.0-7.0)  %


 


Baso % (Auto)  0.1    (0.0-1.5)  %


 


Neut # (Auto)  11.0 H    (1.4-5.7)  K/uL


 


Lymph # (Auto)  0.5 L    (0.6-2.4)  K/uL


 


Mono # (Auto)  0.8    (0.0-0.8)  K/uL


 


Eos # (Auto)  0.0    (0.0-0.7)  K/uL


 


Baso # (Auto)  0.0    (0.0-0.1)  K/uL


 


Nucleated RBC %  0.0    /100WBC


 


Nucleated RBCs #  0    K/uL


 


VBG pH     (7.31-7.41)  


 


VBG pCO2     (41-51)  mmHG


 


VBG pO2     mmHG


 


VBG HCO3     (23-28)  mEq/L


 


VBG Total CO2     (24-29)  mmol/L


 


VBG Base Excess     (-2.0-3.0)  


 


Sodium   113 L*   (136-145)  mmol/L


 


Potassium   4.7   (3.5-5.1)  mmol/L


 


Chloride   77 L   ()  mmol/L


 


Carbon Dioxide   15.4 L   (21.0-32.0)  mmol/L


 


BUN   265 H   (7.0-18.0)  mg/dL


 


Creatinine   6.8 H   (0.6-1.0)  mg/dL


 


Est Cr Clr Drug Dosing   8.23   mL/min


 


Estimated GFR (MDRD)   6.3   ml/min


 


Glucose   156 H   ()  mg/dL


 


Lactic Acid    2.6 H*  (0.4-2.0)  mmol/L


 


Calcium   8.8   (8.5-10.1)  mg/dL


 


Phosphorus   10.8 H   (2.6-4.7)  mg/dL


 


Magnesium   2.3   (1.8-2.4)  mg/dL


 


Total Bilirubin   0.7   (0.2-1.0)  mg/dL


 


AST   51 H   (15-37)  IU/L


 


ALT   63   (14-63)  IU/L


 


Alkaline Phosphatase   181 H   ()  U/L


 


Creatine Kinase   99   ()  U/L


 


Troponin I     (0.000-0.056)  ng/mL


 


Total Protein   7.4   (6.4-8.2)  g/dL


 


Albumin   3.4   (3.4-5.0)  g/dL


 


Globulin   4.0   (2.6-4.0)  g/dL


 


Albumin/Globulin Ratio   0.9   (0.9-1.6)  


 


Lipase   80   ()  U/L


 


Urine Color     


 


Urine Appearance     


 


Urine pH     (5.0-8.0)  


 


Ur Specific Gravity     (1.001-1.035)  


 


Urine Protein     (NEGATIVE)  mg/dL


 


Urine Glucose (UA)     (NEGATIVE)  mg/dL


 


Urine Ketones     (NEGATIVE)  mg/dL


 


Urine Occult Blood     (NEGATIVE)  


 


Urine Nitrite     (NEGATIVE)  


 


Urine Bilirubin     (NEGATIVE)  


 


Urine Urobilinogen     (<2.0)  EU/dL


 


Ur Leukocyte Esterase     (NEGATIVE)  


 


Urine RBC     (0-2/HPF)  


 


Urine WBC     (0-5/HPF)  


 


Ur Epithelial Cells     (NONE-FEW)  


 


Urine Bacteria     (NEGATIVE)  


 


Ur Random Creatinine     mg/dL


 


Ur Random Sodium     (40.0-220.0)  mmol/L


 


SARS-CoV-2 RNA (RODRIGO)     (NEGATIVE)  














  10/13/21 10/13/21 10/13/21 Range/Units





  21:00 22:58 23:15 


 


WBC     (4.0-11.0)  K/uL


 


RBC     (4.30-5.90)  M/uL


 


Hgb     (12.0-16.0)  g/dL


 


Hct     (36.0-46.0)  %


 


MCV     (80.0-98.0)  fL


 


MCH     (27.0-32.0)  pg


 


MCHC     (31.0-37.0)  g/dL


 


RDW Std Deviation     (28.0-62.0)  fl


 


RDW Coeff of Jason     (11.0-15.0)  %


 


Plt Count     


 


MPV     (7.40-12.00)  fL


 


Neut % (Auto)     (48.0-80.0)  %


 


Lymph % (Auto)     (16.0-40.0)  %


 


Mono % (Auto)     (0.0-15.0)  %


 


Eos % (Auto)     (0.0-7.0)  %


 


Baso % (Auto)     (0.0-1.5)  %


 


Neut # (Auto)     (1.4-5.7)  K/uL


 


Lymph # (Auto)     (0.6-2.4)  K/uL


 


Mono # (Auto)     (0.0-0.8)  K/uL


 


Eos # (Auto)     (0.0-0.7)  K/uL


 


Baso # (Auto)     (0.0-0.1)  K/uL


 


Nucleated RBC %     /100WBC


 


Nucleated RBCs #     K/uL


 


VBG pH   7.35   (7.31-7.41)  


 


VBG pCO2   25 L   (41-51)  mmHG


 


VBG pO2   58   mmHG


 


VBG HCO3   14 L   (23-28)  mEq/L


 


VBG Total CO2   15 L   (24-29)  mmol/L


 


VBG Base Excess   -10.0 L   (-2.0-3.0)  


 


Sodium     (136-145)  mmol/L


 


Potassium     (3.5-5.1)  mmol/L


 


Chloride     ()  mmol/L


 


Carbon Dioxide     (21.0-32.0)  mmol/L


 


BUN     (7.0-18.0)  mg/dL


 


Creatinine     (0.6-1.0)  mg/dL


 


Est Cr Clr Drug Dosing     mL/min


 


Estimated GFR (MDRD)     ml/min


 


Glucose     ()  mg/dL


 


Lactic Acid     (0.4-2.0)  mmol/L


 


Calcium     (8.5-10.1)  mg/dL


 


Phosphorus     (2.6-4.7)  mg/dL


 


Magnesium     (1.8-2.4)  mg/dL


 


Total Bilirubin     (0.2-1.0)  mg/dL


 


AST     (15-37)  IU/L


 


ALT     (14-63)  IU/L


 


Alkaline Phosphatase     ()  U/L


 


Creatine Kinase     ()  U/L


 


Troponin I     (0.000-0.056)  ng/mL


 


Total Protein     (6.4-8.2)  g/dL


 


Albumin     (3.4-5.0)  g/dL


 


Globulin     (2.6-4.0)  g/dL


 


Albumin/Globulin Ratio     (0.9-1.6)  


 


Lipase     ()  U/L


 


Urine Color    YELLOW  


 


Urine Appearance    CLEAR  


 


Urine pH    5.5  (5.0-8.0)  


 


Ur Specific Gravity    1.010  (1.001-1.035)  


 


Urine Protein    NEGATIVE  (NEGATIVE)  mg/dL


 


Urine Glucose (UA)    NEGATIVE  (NEGATIVE)  mg/dL


 


Urine Ketones    NEGATIVE  (NEGATIVE)  mg/dL


 


Urine Occult Blood    TRACE-INTACT H  (NEGATIVE)  


 


Urine Nitrite    NEGATIVE  (NEGATIVE)  


 


Urine Bilirubin    NEGATIVE  (NEGATIVE)  


 


Urine Urobilinogen    0.2  (<2.0)  EU/dL


 


Ur Leukocyte Esterase    TRACE H  (NEGATIVE)  


 


Urine RBC    0-2  (0-2/HPF)  


 


Urine WBC    2-5  (0-5/HPF)  


 


Ur Epithelial Cells    OCCASIONAL  (NONE-FEW)  


 


Urine Bacteria    FEW  (NEGATIVE)  


 


Ur Random Creatinine     mg/dL


 


Ur Random Sodium     (40.0-220.0)  mmol/L


 


SARS-CoV-2 RNA (RODRIGO)  NEGATIVE    (NEGATIVE)  














  10/13/21 10/14/21 10/14/21 Range/Units





  23:15 00:48 00:55 


 


WBC     (4.0-11.0)  K/uL


 


RBC     (4.30-5.90)  M/uL


 


Hgb     (12.0-16.0)  g/dL


 


Hct     (36.0-46.0)  %


 


MCV     (80.0-98.0)  fL


 


MCH     (27.0-32.0)  pg


 


MCHC     (31.0-37.0)  g/dL


 


RDW Std Deviation     (28.0-62.0)  fl


 


RDW Coeff of Jason     (11.0-15.0)  %


 


Plt Count     


 


MPV     (7.40-12.00)  fL


 


Neut % (Auto)     (48.0-80.0)  %


 


Lymph % (Auto)     (16.0-40.0)  %


 


Mono % (Auto)     (0.0-15.0)  %


 


Eos % (Auto)     (0.0-7.0)  %


 


Baso % (Auto)     (0.0-1.5)  %


 


Neut # (Auto)     (1.4-5.7)  K/uL


 


Lymph # (Auto)     (0.6-2.4)  K/uL


 


Mono # (Auto)     (0.0-0.8)  K/uL


 


Eos # (Auto)     (0.0-0.7)  K/uL


 


Baso # (Auto)     (0.0-0.1)  K/uL


 


Nucleated RBC %     /100WBC


 


Nucleated RBCs #     K/uL


 


VBG pH     (7.31-7.41)  


 


VBG pCO2     (41-51)  mmHG


 


VBG pO2     mmHG


 


VBG HCO3     (23-28)  mEq/L


 


VBG Total CO2     (24-29)  mmol/L


 


VBG Base Excess     (-2.0-3.0)  


 


Sodium     (136-145)  mmol/L


 


Potassium     (3.5-5.1)  mmol/L


 


Chloride     ()  mmol/L


 


Carbon Dioxide     (21.0-32.0)  mmol/L


 


BUN     (7.0-18.0)  mg/dL


 


Creatinine     (0.6-1.0)  mg/dL


 


Est Cr Clr Drug Dosing     mL/min


 


Estimated GFR (MDRD)     ml/min


 


Glucose     ()  mg/dL


 


Lactic Acid   1.7   (0.4-2.0)  mmol/L


 


Calcium     (8.5-10.1)  mg/dL


 


Phosphorus     (2.6-4.7)  mg/dL


 


Magnesium     (1.8-2.4)  mg/dL


 


Total Bilirubin     (0.2-1.0)  mg/dL


 


AST     (15-37)  IU/L


 


ALT     (14-63)  IU/L


 


Alkaline Phosphatase     ()  U/L


 


Creatine Kinase     ()  U/L


 


Troponin I    0.091 H*  (0.000-0.056)  ng/mL


 


Total Protein     (6.4-8.2)  g/dL


 


Albumin     (3.4-5.0)  g/dL


 


Globulin     (2.6-4.0)  g/dL


 


Albumin/Globulin Ratio     (0.9-1.6)  


 


Lipase     ()  U/L


 


Urine Color     


 


Urine Appearance     


 


Urine pH     (5.0-8.0)  


 


Ur Specific Gravity     (1.001-1.035)  


 


Urine Protein     (NEGATIVE)  mg/dL


 


Urine Glucose (UA)     (NEGATIVE)  mg/dL


 


Urine Ketones     (NEGATIVE)  mg/dL


 


Urine Occult Blood     (NEGATIVE)  


 


Urine Nitrite     (NEGATIVE)  


 


Urine Bilirubin     (NEGATIVE)  


 


Urine Urobilinogen     (<2.0)  EU/dL


 


Ur Leukocyte Esterase     (NEGATIVE)  


 


Urine RBC     (0-2/HPF)  


 


Urine WBC     (0-5/HPF)  


 


Ur Epithelial Cells     (NONE-FEW)  


 


Urine Bacteria     (NEGATIVE)  


 


Ur Random Creatinine  90.2    mg/dL


 


Ur Random Sodium  5.0 L    (40.0-220.0)  mmol/L


 


SARS-CoV-2 RNA (RODRIGO)     (NEGATIVE)  














  10/14/21 10/14/21 Range/Units





  03:05 03:05 


 


WBC    (4.0-11.0)  K/uL


 


RBC    (4.30-5.90)  M/uL


 


Hgb    (12.0-16.0)  g/dL


 


Hct    (36.0-46.0)  %


 


MCV    (80.0-98.0)  fL


 


MCH    (27.0-32.0)  pg


 


MCHC    (31.0-37.0)  g/dL


 


RDW Std Deviation    (28.0-62.0)  fl


 


RDW Coeff of Jason    (11.0-15.0)  %


 


Plt Count    


 


MPV    (7.40-12.00)  fL


 


Neut % (Auto)    (48.0-80.0)  %


 


Lymph % (Auto)    (16.0-40.0)  %


 


Mono % (Auto)    (0.0-15.0)  %


 


Eos % (Auto)    (0.0-7.0)  %


 


Baso % (Auto)    (0.0-1.5)  %


 


Neut # (Auto)    (1.4-5.7)  K/uL


 


Lymph # (Auto)    (0.6-2.4)  K/uL


 


Mono # (Auto)    (0.0-0.8)  K/uL


 


Eos # (Auto)    (0.0-0.7)  K/uL


 


Baso # (Auto)    (0.0-0.1)  K/uL


 


Nucleated RBC %    /100WBC


 


Nucleated RBCs #    K/uL


 


VBG pH    (7.31-7.41)  


 


VBG pCO2    (41-51)  mmHG


 


VBG pO2    mmHG


 


VBG HCO3    (23-28)  mEq/L


 


VBG Total CO2    (24-29)  mmol/L


 


VBG Base Excess    (-2.0-3.0)  


 


Sodium  120 L   (136-145)  mmol/L


 


Potassium  4.7   (3.5-5.1)  mmol/L


 


Chloride  87 L   ()  mmol/L


 


Carbon Dioxide  15.0 L   (21.0-32.0)  mmol/L


 


BUN  230 H   (7.0-18.0)  mg/dL


 


Creatinine  5.2 H   (0.6-1.0)  mg/dL


 


Est Cr Clr Drug Dosing  10.77   mL/min


 


Estimated GFR (MDRD)  8.5   ml/min


 


Glucose  72 L   ()  mg/dL


 


Lactic Acid    (0.4-2.0)  mmol/L


 


Calcium  7.4 L   (8.5-10.1)  mg/dL


 


Phosphorus    (2.6-4.7)  mg/dL


 


Magnesium    (1.8-2.4)  mg/dL


 


Total Bilirubin    (0.2-1.0)  mg/dL


 


AST    (15-37)  IU/L


 


ALT    (14-63)  IU/L


 


Alkaline Phosphatase    ()  U/L


 


Creatine Kinase    ()  U/L


 


Troponin I   0.077 H*  (0.000-0.056)  ng/mL


 


Total Protein    (6.4-8.2)  g/dL


 


Albumin    (3.4-5.0)  g/dL


 


Globulin    (2.6-4.0)  g/dL


 


Albumin/Globulin Ratio    (0.9-1.6)  


 


Lipase    ()  U/L


 


Urine Color    


 


Urine Appearance    


 


Urine pH    (5.0-8.0)  


 


Ur Specific Gravity    (1.001-1.035)  


 


Urine Protein    (NEGATIVE)  mg/dL


 


Urine Glucose (UA)    (NEGATIVE)  mg/dL


 


Urine Ketones    (NEGATIVE)  mg/dL


 


Urine Occult Blood    (NEGATIVE)  


 


Urine Nitrite    (NEGATIVE)  


 


Urine Bilirubin    (NEGATIVE)  


 


Urine Urobilinogen    (<2.0)  EU/dL


 


Ur Leukocyte Esterase    (NEGATIVE)  


 


Urine RBC    (0-2/HPF)  


 


Urine WBC    (0-5/HPF)  


 


Ur Epithelial Cells    (NONE-FEW)  


 


Urine Bacteria    (NEGATIVE)  


 


Ur Random Creatinine    mg/dL


 


Ur Random Sodium    (40.0-220.0)  mmol/L


 


SARS-CoV-2 RNA (RODRIGO)    (NEGATIVE)  











Meds: 


Medications











Generic Name Dose Route Start Last Admin





  Trade Name Freq  PRN Reason Stop Dose Admin


 


Sodium Chloride  1,000 mls @ 1,000 mls/hr  10/14/21 01:15  10/14/21 01:53





  Normal Saline  IV   1,000 mls/hr





  ASDIRECTED [place]   Administration


 


Sodium Chloride  1,000 mls @ 1,000 mls/hr  10/14/21 05:30 





  Normal Saline  IV  





  ASDIRECTED [place]  


 


Dopamine HCl/Dextrose  400 mg in 250 mls @ 4.286 mls/hr  10/14/21 05:30 





  Dopamine In D5w 400 Mg/250 Ml  IV  





  ASDIRECTED [place]  





  Protocol  





  2 MCG/KG/MIN  














Discontinued Medications














Generic Name Dose Route Start Last Admin





  Trade Name Freq  PRN Reason Stop Dose Admin


 


Dextrose/Water  50 ml  10/14/21 03:46  10/14/21 04:05





  50% Dextrose In Water 50 Ml Syringe  IVPUSH  10/14/21 03:47  50 ml





  ONETIME ONE   Administration


 


Sodium Chloride  1,000 mls @ 999 mls/hr  10/13/21 20:12  10/13/21 20:17





  Normal Saline  IV  10/13/21 21:12  999 mls/hr





  NOW STA   Administration


 


Piperacillin Sod/Tazobactam  50 mls @ 100 mls/hr  10/13/21 23:44  10/14/21 00:52





  Sod 3.375 gm/ Sodium Chloride  IV  10/14/21 00:13  100 mls/hr





  ONETIME ONE   Administration


 


Vancomycin HCl 1 gm/ Sodium  250 mls @ 166 mls/hr  10/13/21 23:44  10/14/21 

01:25





  Chloride  IV  10/14/21 01:14  166 mls/hr





  ONETIME ONE   Administration


 


Norepinephrine Bitartrate  4 mg in 250 mls @ 7.5 mls/hr  10/14/21 05:00  

10/14/21 04:53





  Norepinephr-0.9% Nacl 4 Mg/250  IV   2 mcg/min





  TITRATE [place]   7.5 mls/hr





    Administration





  Protocol  





  2 MCG/MIN  


 


Norepinephrine Bitartrate  4 mg in 250 mls @ 18.75 mls/hr  10/14/21 05:00 





  Norepinephr-0.9% Nacl 4 Mg/250  IV  





  TITRATE [place]  





  Protocol  





  5 MCG/MIN  


 


Oxycodone HCl  5 mg  10/13/21 21:51  10/13/21 21:57





  Oxycodone 5 Mg Tab  PO  10/13/21 21:52  5 mg





  ONETIME ONE   Administration














- Re-Assessments/Exams


Free Text/Narrative Re-Assessment/Exam: 





10/13/21 22:34


I have called 5 hospitals to try to obtain transferred for for patient.  Noted 

to have bedsores obtain none trauma or stimulations or strokes.  We will 

continue to call around to try her and transfer patient.


10/13/21 23:09


We [facility] the [facility] transfer [facility] for assistance in trying to transfer patient

 and they are calling around still in a local beds.  We also tried to place a 

[place] and patient she is refusing.  She also refused as to what hospital we we 

will send her we explained that there is a limitation of beds in the country and

 that she will have to go to his hospital will take her and can do dialysis but 

patient still refusing.


10/13/21 23:52


We were able to speak to critical care at [facility] again recommendations

 to follow and try to treat for impending septic shock and to give more IV 

fluids as well.  We will send urine studies give antibiotics send blood cultures

 and once results of that we will will call and check again again there is still

 currently no beds to send patient.


10/14/21 03:14





10/14/21 05:28


Patient likely did improve but due to patient remained hypotensive after 

receiving 2 L with the start on Levophed we spoke to critical care and 

nephrology at [facility] and they recommended stopping the Levophed as her

 lactate is getting better and they wanted to do dopamine as needed if he wanted

 to improve her blood pressure for the transfer there.  Patient's been accepted 

 at [facility].





Departure





- Departure


Time of Disposition: 05:28


Disposition: DC/Tfer to Acute Hospital 02


Condition: Good


Clinical Impression: 


 Hypotension, SAMANTHA (acute kidney injury)








- Discharge Information


Referrals: 


Quinton Lorenzo MD [Primary Care Provider] - 


Forms:  ED Department Discharge





Critical Care Note





- Critical Care Note


Total Time (mins): 120


Comments: 








Critical Care Procedure Note


Authorized and Performed by: Dr. Wolfe


Total critical care time: Approximately  





Due to a high probability of clinically significant, life threatening 

deterioration, the patient required my highest level of preparedness to 

intervene emergently and I personally spent this critical care time directly and

personally managing the patient. This critical care time included obtaining a 

history; examining the patient; pulse oximetry; ordering and review of studies; 

arranging urgent treatment with development of a management plan; evaluation of 

patient's response to treatment; frequent reassessment; and, discussions with 

other providers.


This critical care time was performed to assess and manage the high probability 

of imminent, life-threatening deterioration that could result in multi-organ 

failure. It was exclusive of separately billable procedures and treating other 

patients and teaching time.





Sepsis Event Note (ED)





- Evaluation


Sepsis Screening Result: No Definite Risk





- Focused Exam


Vital Signs: 


                                   Vital Signs











  Temp Pulse Resp BP Pulse Ox


 


 10/14/21 05:11     85/60 L 


 


 10/14/21 05:00   87  11 L  76/40 L  98


 


 10/14/21 04:00   86  13  77/50 L  96


 


 10/14/21 03:00   80  11 L  70/30 L  96


 


 10/14/21 02:00   74  12  71/48 L  95


 


 10/14/21 01:00   78  11 L  88/52 L  96


 


 10/14/21 00:30   88  20  78/51 L  96


 


 10/13/21 23:54   79  13  77/44 L 


 


 10/13/21 22:49   74  11 L  72/42 L 


 


 10/13/21 20:46   76    97


 


 10/13/21 20:22   89  18  82/42 L 


 


 10/13/21 19:45  97 F  82  20  87/48 L  95














- My Orders


Last 24 Hours: 


My Active Orders





10/13/21 21:12


Urinary Catheter Assessment [RC] ASDIRECTED 





10/13/21 21:15


Insert Urinary Catheter [OM.PC] Q24H 





10/13/21 23:15


OSMOLALITY - URINE Stat 





10/13/21 23:47


CULTURE BLOOD [BC] Stat 


CULTURE BLOOD [BC] Stat 


Blood Culture x2 Reflex Set [OM.PC] Stat 





10/13/21 23:51


PROCALCITONIN [REF] Stat 





10/14/21 00:00


UREA NITROGEN, URINE Stat 


URIC ACID, URINE Stat 





10/14/21 01:15


Sodium Chloride 0.9% [Normal Saline] 1,000 ml IV ASDIRECTED 





10/14/21 03:46


GLUCOSE,POC [POC] Stat 





10/14/21 05:22


Urinary Catheter Assessment [RC] ASDIRECTED 





10/14/21 05:30


Insert [place] Catheter [Insert Urinary Catheter] [OM.PC] Q24H 


DOPamine/Dextrose 5%-Water [DOPamine in D5W 400 MG/250 ML] 400 mg in 250 ml IV 

ASDIRECTED 


Sodium Chloride 0.9% [Normal Saline] 1,000 ml IV ASDIRECTED 














- Assessment/Plan


Last 24 Hours: 


My Active Orders





10/13/21 21:12


Urinary Catheter Assessment [RC] ASDIRECTED 





10/13/21 21:15


Insert Urinary Catheter [OM.PC] Q24H 





10/13/21 23:15


OSMOLALITY - URINE Stat 





10/13/21 23:47


CULTURE BLOOD [BC] Stat 


CULTURE BLOOD [BC] Stat 


Blood Culture x2 Reflex Set [OM.PC] Stat 





10/13/21 23:51


PROCALCITONIN [REF] Stat 





10/14/21 00:00


UREA NITROGEN, URINE Stat 


URIC ACID, URINE Stat 





10/14/21 01:15


Sodium Chloride 0.9% [Normal Saline] 1,000 ml IV ASDIRECTED 





10/14/21 03:46


GLUCOSE,POC [POC] Stat 





10/14/21 05:22


Urinary Catheter Assessment [RC] ASDIRECTED 





10/14/21 05:30


Insert Lorenzana Catheter [Insert Urinary Catheter] [OM.PC] Q24H 


DOPamine/Dextrose 5%-Water [DOPamine in D5W 400 MG/250 ML] 400 mg in 250 ml IV 

ASDIRECTED 


Sodium Chloride 0.9% [Normal Saline] 1,000 ml IV ASDIRECTED 











Plan: 





Patient is a 55-year-old female who presents today for possible acute renal 

failure.  Patient side labs that show elevated BUN and creatinine but potassium 

is normal.  We will obtain EKG labs provide IV fluids and reassess patient.

## 2021-10-14 NOTE — CT
INDICATION:



Diffuse abdominal pain. Acute kidney injury.



COMPARISON:



05/26/2021.



TECHNIQUE:



CT abdomen and pelvis without contrast.



FINDINGS:



Imaged lung bases are unremarkable. Noncontrast evaluation of the liver, 

adrenal glands are unremarkable. Spleen is top-normal in size, decreased 

from prior. Calcifications in the pancreatic head are similar to prior. No 

renal, ureteral or bladder calculus. No hydronephrosis or hydroureter.



Atherosclerotic abdominal aorta. Open midline anterior abdominal wall wound 

is again noted, with herniation of mesenteric fat, similar to prior. 

Bladder is unremarkable. Uterus is absent or atrophic. Right hemicolectomy. 

No bowel obstruction or inflammation. No enlarged abdominal or pelvic lymph 

nodes. Bones are unremarkable.



IMPRESSION:



1. Open midline anterior abdominal wound with herniation of mesenteric fat, 

similar to prior.



2. Otherwise no acute abnormality



Please note that all CT scans at this facility use dose modulation, 

iterative reconstruction, and/or weight-based dosing when appropriate to 

reduce radiation dose to as low as reasonably achievable.



Dictated by Freddie Nelson MD @ 10/14/2021 1:57:15 AM



(Electronically Signed)